# Patient Record
Sex: FEMALE | Race: BLACK OR AFRICAN AMERICAN | Employment: UNEMPLOYED | URBAN - METROPOLITAN AREA
[De-identification: names, ages, dates, MRNs, and addresses within clinical notes are randomized per-mention and may not be internally consistent; named-entity substitution may affect disease eponyms.]

---

## 2017-08-23 ENCOUNTER — OFFICE VISIT (OUTPATIENT)
Dept: OBGYN CLINIC | Age: 27
End: 2017-08-23

## 2017-08-23 DIAGNOSIS — N76.0 ACUTE VAGINITIS: ICD-10-CM

## 2017-08-23 DIAGNOSIS — R31.9 URINARY TRACT INFECTION WITH HEMATURIA, SITE UNSPECIFIED: Primary | ICD-10-CM

## 2017-08-23 DIAGNOSIS — N39.0 URINARY TRACT INFECTION WITH HEMATURIA, SITE UNSPECIFIED: Primary | ICD-10-CM

## 2017-08-23 LAB
BILIRUB UR QL STRIP: NEGATIVE
GLUCOSE UR-MCNC: NORMAL MG/DL
KETONES P FAST UR STRIP-MCNC: NEGATIVE MG/DL
PH UR STRIP: 7 [PH] (ref 4.6–8)
PROT UR QL STRIP: NEGATIVE MG/DL
SP GR UR STRIP: 1.01 (ref 1–1.03)
UA UROBILINOGEN AMB POC: NORMAL (ref 0.2–1)
URINALYSIS CLARITY POC: NORMAL
URINALYSIS COLOR POC: YELLOW
URINE BLOOD POC: NORMAL
URINE LEUKOCYTES POC: NORMAL
URINE NITRITES POC: NEGATIVE

## 2017-08-23 RX ORDER — NITROFURANTOIN 25; 75 MG/1; MG/1
100 CAPSULE ORAL 2 TIMES DAILY
Qty: 14 CAP | Refills: 0 | Status: SHIPPED | OUTPATIENT
Start: 2017-08-23 | End: 2018-12-11

## 2017-08-23 NOTE — PROGRESS NOTES
UTI note    Mallorie Florez is a ,  32 y.o. female [address] who presents today with concerns regarding possible UTI. Her symptoms started 1 week ago, unchanged since that time. Discomfort is in the suprapubic area and does not radiate. Symptoms are not alleviated by hydration. Symptoms are exacerbated with activity. Patient does have a history of recurrent UTI. She does not have a history of pyelonephritis. She has a history of  has a past medical history of DM (diabetes mellitus) ([address] (2012); Essential hypertension; HPV test positive (11); Hypercholesterolemia; Migraine; Mild depression ([address] (2012); Pap smear for cervical cancer screening (13;09/28/15); Postpartum depression (2012); and Vitamin D deficiency. She also has no past medical history of Abnormal Papanicolaou smear of cervix; Anemia; Asthma; Breast disorder; Chlamydia; Complication of anesthesia; Disease of blood and blood forming organ; Epilepsy ([address] Genital herpes; Gonorrhea; Heart abnormality; Herpes gestationis; Herpes simplex virus (HSV) infection; Human immunodeficiency virus (HIV) disease ([address] Infertility, female; Kidney disease; Liver disease; Phlebitis and thrombophlebitis; Pituitary disorder ([address] Polycystic disease, ovaries; Rhesus isoimmunization affecting pregnancy; Sickle cell disease ([address] Syphilis; Systemic lupus erythematosus ([address] Thyroid activity decreased; or Trauma. with the following surgical history  has a past surgical history that includes  section (;) and  delivery only (). .  . She has not been evaluated for her current complaints. Of itching at the urethral orifice and blood in urine. Last urinalysis results are:    Urine dipstick shows negative for all components, positive for leukocytes and blood.     Results for orders placed or performed in visit on 11/19/15   AMB POC URINALYSIS DIP STICK MANUAL W/O MICRO     Status: None Result Value Ref Range Status    Color (UA POC) Yellow  Final    Clarity (UA POC) Slightly Cloudy  Final    Glucose (UA POC) 4+ Negative Final    Bilirubin (UA POC) Negative Negative Final    Ketones (UA POC) Negative Negative Final    Specific gravity (UA POC)  1.001 - 1.035     Blood (UA POC) Negative Negative Final    pH (UA POC)  4.6 - 8.0     Protein (UA POC) Negative Negative mg/dL Final    Urobilinogen (UA POC)  0.2 - 1     Nitrites (UA POC) Negative Negative Final    Leukocyte esterase (UA POC) Negative Negative Final   Results for orders placed or performed in visit on 14   AMB POC URINALYSIS DIP STICK AUTO W/O MICRO     Status: None   Result Value Ref Range Status    Color (UA POC) Yellow  Final     Comment: wbc 5-8    Clarity (UA POC) Clear  Final     Comment: epith 1    Glucose (UA POC) Negative Negative Final    Bilirubin (UA POC) Negative Negative Final    Ketones (UA POC) Negative Negative Final    Specific gravity (UA POC) 1.025 1.001 - 1.035 Final    Blood (UA POC) Negative Negative Final    pH (UA POC) 8.0 4.6 - 8.0 Final    Protein (UA POC) Negative Negative mg/dL Final    Urobilinogen (UA POC) 0.2 mg/dL 0.2 - 1 Final    Nitrites (UA POC) Negative Negative Final    Leukocyte esterase (UA POC) 1+ Negative Final       Past Medical History:   Diagnosis Date    DM (diabetes mellitus) ([facility] 2012    Type II    Essential hypertension     hx of pre-e with previous pregnancy    HPV test positive 11    Neg 16&18    Hypercholesterolemia     Migraine     Mild depression [facility] 2012    Pap smear for cervical cancer screening 13;09/28/15    Normal Pap, Neg GC    Postpartum depression 2012    Vitamin D deficiency      Past Surgical History:   Procedure Laterality Date     DELIVERY ONLY      c/s for LGA     HX  SECTION  ;2016    x2     Social History     Occupational History    Not on file.      Social History Main Topics    Smoking status: Never Smoker    Smokeless tobacco: Never Used    Alcohol use No    Drug use: No    Sexual activity: Not Currently     Partners: Male     Birth control/ protection: None     Family History   Problem Relation Age of Onset    Diabetes Mother      Type II    Diabetes Sister 25     Type II       No Known Allergies  Prior to Admission medications    Medication Sig Start Date End Date Taking? Authorizing Provider   insulin aspart (NOVOLOG) 100 unit/mL inpn Inject 30 to 35 units three times daily before meals 6/13/16   JORDAN Wells MD   insulin detemir (LEVEMIR FLEXTOUCH) 100 unit/mL (3 mL) inpn Inject 25 units at bedtime 6/6/16   Nai Wilson MD   norgestimate-ethinyl estradiol ([address] [address],) 0.25-35 mg-mcg tab Take 1 Tab by mouth daily. 5/24/16   Elvis Rao MD   BD INSULIN SYRINGE ULTRA-FINE 1/2 mL 31 x 5/16\" syrg USE THREE TIMES DAILY 1/7/16   JORDAN Wells MD   glucose blood VI test strips [facility] ULTRA TEST) strip Test blood glucose 4 daily Dx Code: E11.65 11/17/15   Nai Wilson MD   Blood-Glucose Meter (ONETOUCH ULTRAMINI) monitoring kit Test blood glucose 4 times daily Dx Code: E11.65 11/17/15   Nai Wilson MD   Lancets misc Test blood glucose 4 times daily Dx Code: E11.65 11/17/15   Nai Wilson MD        Review of Systems: History obtained from the patient  Constitutional: negative for weight loss, fever, night sweats  Breast: negative for breast lumps, nipple discharge, galactorrhea  GI: negative for change in bowel habits, abdominal pain, black or bloody stools  : see HPI, negative for vaginal discharge  MSK: negative for back pain, joint pain, muscle pain  Skin: negative for itching, rash, hives  Psych: negative for anxiety, depression, change in mood      Objective: There were no vitals taken for this visit.     Physical Exam:   PHYSICAL EXAMINATION    Constitutional  · Appearance: well-nourished, well developed, alert, in no acute distress    Gastrointestinal  · Abdominal Examination: abdomen non-tender to palpation, normal bowel sounds, no masses present  · Liver and spleen: no hepatomegaly present, spleen not palpable  · Hernias: no hernias identified    Genitourinary  · External Genitalia: normal appearance for age, no discharge present, no tenderness present, no inflammatory lesions present, no masses present, no atrophy present  · Vagina: normal vaginal vault without central or paravaginal defects, some discharge present, no inflammatory lesions present, no masses present  · Bladder: tender to palpation  · Urethra: appears normal  · Cervix: normal   · Uterus: normal size, shape and consistency  · Adnexa: no adnexal tenderness present, no adnexal masses present  · Perineum: perineum within normal limits, no evidence of trauma, no rashes or skin lesions present  · Anus: anus within normal limits, no hemorrhoids present  · Inguinal Lymph Nodes: no lymphadenopathy present    Skin  · General Inspection: no rash, no lesions identified    Neurologic/Psychiatric  · Mental Status:  · Orientation: grossly oriented to person, place and time  · Mood and Affect: mood normal, affect appropriate    Assessment:   UTI  Possible vaginitis    Plan:   Rx: macrobid and prn findings on NS.

## 2017-08-25 LAB — BACTERIA UR CULT: NO GROWTH

## 2017-08-28 LAB
A VAGINAE DNA VAG QL NAA+PROBE: NORMAL SCORE
BVAB2 DNA VAG QL NAA+PROBE: NORMAL SCORE
C ALBICANS DNA VAG QL NAA+PROBE: NEGATIVE
C GLABRATA DNA VAG QL NAA+PROBE: NEGATIVE
MEGA1 DNA VAG QL NAA+PROBE: NORMAL SCORE
T VAGINALIS RRNA SPEC QL NAA+PROBE: NEGATIVE

## 2017-09-19 ENCOUNTER — TELEPHONE (OUTPATIENT)
Dept: OBGYN CLINIC | Age: 27
End: 2017-09-19

## 2017-09-19 NOTE — TELEPHONE ENCOUNTER
Call received at 2:50PM      32year old patient last seen in the office on 8/23/17. Patient advised of MD reviewed labs and recommendations. Patient reports vaginal discharge for the past three days with odor. Patient placed on the schedule for 9/21/17 at 1:30PM    Patient verbalized understanding.

## 2017-09-21 ENCOUNTER — OFFICE VISIT (OUTPATIENT)
Dept: OBGYN CLINIC | Age: 27
End: 2017-09-21

## 2017-09-21 VITALS
WEIGHT: 132 LBS | HEIGHT: 62 IN | BODY MASS INDEX: 24.29 KG/M2 | SYSTOLIC BLOOD PRESSURE: 120 MMHG | DIASTOLIC BLOOD PRESSURE: 80 MMHG

## 2017-09-21 DIAGNOSIS — N76.0 VAGINITIS AND VULVOVAGINITIS: Primary | ICD-10-CM

## 2017-09-21 RX ORDER — METRONIDAZOLE 500 MG/1
500 TABLET ORAL 2 TIMES DAILY WITH MEALS
Qty: 14 TAB | Refills: 0 | Status: SHIPPED | OUTPATIENT
Start: 2017-09-21 | End: 2017-09-28

## 2017-09-21 NOTE — PROGRESS NOTES
Vaginitis evaluation    Chief Complaint   Vaginitis      HPI  32 y.o. female complains of yellow vaginal discharge with an odor for several days. No LMP recorded. She denies additional symptoms at this time. The patient denies aggravating factors. She is not concerned about possible STI exposure at this time but she would like to add them on the swab. She denies exposure to new chemicals ot hygenic agents  Previous treatment included: none    Past Medical History:   Diagnosis Date    DM (diabetes mellitus) ([facility] 2012    Type II    Essential hypertension     hx of pre-e with previous pregnancy    HPV test positive 11    Neg 16&18    Hypercholesterolemia     Migraine     Mild depression ([facility] 2012    Pap smear for cervical cancer screening 13;09/28/15    Normal Pap, Neg GC    Postpartum depression 2012    Vitamin D deficiency      Past Surgical History:   Procedure Laterality Date     DELIVERY ONLY      c/s for LGA     HX  SECTION  ;2016    x2     Social History     Occupational History    Not on file. Social History Main Topics    Smoking status: Never Smoker    Smokeless tobacco: Never Used    Alcohol use No    Drug use: No    Sexual activity: Not Currently     Partners: Male     Birth control/ protection: None     Family History   Problem Relation Age of Onset    Diabetes Mother      Type II    Diabetes Sister 25     Type II        No Known Allergies  Prior to Admission medications    Medication Sig Start Date End Date Taking? Authorizing Provider   nitrofurantoin, macrocrystal-monohydrate, (MACROBID) 100 mg capsule Take 1 Cap by mouth two (2) times a day.  17   Ezekiel Hirsch MD   insulin aspart (NOVOLOG) 100 unit/mL inpn Inject 30 to 35 units three times daily before meals 16   JORDAN Leon MD   insulin detemir (LEVEMIR FLEXTOUCH) 100 unit/mL (3 mL) inpn Inject 25 units at bedtime 16   Margy Hanna MD norgestimate-ethinyl estradiol ([facility], ,) 0.25-35 mg-mcg tab Take 1 Tab by mouth daily.  5/24/16   Anthony Becerra MD   BD INSULIN SYRINGE ULTRA-FINE 1/2 mL 31 x 5/16\" syrg USE THREE TIMES DAILY 1/7/16   JORDAN Martin MD   glucose blood VI test strips [facility] ULTRA TEST) strip Test blood glucose 4 daily Dx Code: E11.65 11/17/15   Kathe Wells MD   Blood-Glucose Meter (ONETOUCH ULTRAMINI) monitoring kit Test blood glucose 4 times daily Dx Code: E11.65 11/17/15   Kathe Wells MD   Lancets misc Test blood glucose 4 times daily Dx Code: E11.65 11/17/15   Kathe Wells MD                      Review of Systems - History obtained from the patient  Constitutional: negative for weight loss, fever, night sweats  Breast: negative for breast lumps, nipple discharge, galactorrhea  GI: negative for change in bowel habits, abdominal pain, black or bloody stools  : negative for frequency, dysuria, hematuria  MSK: negative for back pain, joint pain, muscle pain  Skin: negative for itching, rash, hives  Neuro: negative for dizziness, headache, confusion, weakness  Psych: negative for anxiety, depression, change in mood  Heme/lymph: negative for bleeding, bruising, pallor       Objective:    Visit Vitals    /80    Ht 5' 2\" (1.575 m)    Wt 132 lb (59.9 kg)    BMI 24.14 kg/m2       Physical Exam:   PHYSICAL EXAMINATION    Constitutional  · Appearance: well-nourished, well developed, alert, in no acute distress    HENT  · Head and Face: appears normal    Genitourinary  · External Genitalia: normal appearance for age, no discharge present, no tenderness present, no inflammatory lesions present, no masses present, no atrophy present  · Vagina:  tan discharge present, otherwise normal vaginal vault without central or paravaginal defects, no inflammatory lesions present, no masses present  · Bladder: non-tender to palpation  · Urethra: appears normal  · Cervix: normal   · Uterus: normal size, shape and consistency  · Adnexa: no adnexal tenderness present, no adnexal masses present  · Perineum: perineum within normal limits, no evidence of trauma, no rashes or skin lesions present  · Anus: anus within normal limits, no hemorrhoids present  · Inguinal Lymph Nodes: no lymphadenopathy present    Skin  · General Inspection: no rash, no lesions identified    Neurologic/Psychiatric  · Mental Status:  · Orientation: grossly oriented to person, place and time  · Mood and Affect: mood normal, affect appropriate    No results found for any visits on 09/21/17. Assessment:   Clinically bacterial vaginosis    Plan:   Treatment: per NS, with Flagyl sent today--call results    ROV prn if symptoms persist or worsen.

## 2017-09-27 LAB
A VAGINAE DNA VAG QL NAA+PROBE: ABNORMAL SCORE
BVAB2 DNA VAG QL NAA+PROBE: ABNORMAL SCORE
C ALBICANS DNA VAG QL NAA+PROBE: NEGATIVE
C GLABRATA DNA VAG QL NAA+PROBE: NEGATIVE
C TRACH RRNA SPEC QL NAA+PROBE: NEGATIVE
MEGA1 DNA VAG QL NAA+PROBE: ABNORMAL SCORE
N GONORRHOEA RRNA SPEC QL NAA+PROBE: NEGATIVE
T VAGINALIS RRNA SPEC QL NAA+PROBE: NEGATIVE

## 2017-10-04 ENCOUNTER — TELEPHONE (OUTPATIENT)
Dept: OBGYN CLINIC | Age: 27
End: 2017-10-04

## 2017-10-04 RX ORDER — METRONIDAZOLE 500 MG/1
500 TABLET ORAL 2 TIMES DAILY
Qty: 14 TAB | Refills: 0 | Status: SHIPPED | OUTPATIENT
Start: 2017-10-04 | End: 2017-10-11

## 2017-10-04 NOTE — TELEPHONE ENCOUNTER
Prescription sent per MD order. Patient verbalized understanding of results.     Notes Recorded by Luna Manley MD on 9/27/2017 at 8:25 AM  Notify BV only. Barbi Venegas Rx'd Flagyl 500 bid for 7 days, one refill

## 2017-10-04 NOTE — PROGRESS NOTES
Patient called back and was notified of results and MD recommendations. Patient verbalized understanding. Prescription sent per MD order.

## 2017-10-16 RX ORDER — INSULIN GLARGINE 100 [IU]/ML
INJECTION, SOLUTION SUBCUTANEOUS
Qty: 15 ML | Refills: 3 | Status: SHIPPED | OUTPATIENT
Start: 2017-10-16 | End: 2018-07-11 | Stop reason: SDUPTHER

## 2017-11-20 ENCOUNTER — TELEPHONE (OUTPATIENT)
Dept: OBGYN CLINIC | Age: 27
End: 2017-11-20

## 2017-11-20 NOTE — TELEPHONE ENCOUNTER
Patient calling stating that she believes she has another UTI and wanted an appointment. Patient states that she has urinary frequency and burning. Patient wanted to be seen tomorrow and advised that she is welcomed to contact back to see if someone cancels as HW is not available at the times she needed. Patient advised that she could go to a local urgent care and patient asked me to find one near her and patient given the address to [facility] here in [place] [address]. Patient will go there later one tonight.

## 2018-01-03 ENCOUNTER — OFFICE VISIT (OUTPATIENT)
Dept: OBGYN CLINIC | Age: 28
End: 2018-01-03

## 2018-01-03 VITALS
DIASTOLIC BLOOD PRESSURE: 80 MMHG | WEIGHT: 132 LBS | HEIGHT: 62 IN | BODY MASS INDEX: 24.29 KG/M2 | SYSTOLIC BLOOD PRESSURE: 120 MMHG

## 2018-01-03 DIAGNOSIS — N76.0 VAGINITIS AND VULVOVAGINITIS: ICD-10-CM

## 2018-01-03 DIAGNOSIS — N93.9 ABNORMAL UTERINE BLEEDING: Primary | ICD-10-CM

## 2018-01-03 DIAGNOSIS — R10.2 PELVIC PAIN: ICD-10-CM

## 2018-01-03 LAB
BILIRUB UR QL STRIP: NEGATIVE
GLUCOSE UR-MCNC: NEGATIVE MG/DL
HCG URINE, QL. (POC): NEGATIVE
KETONES P FAST UR STRIP-MCNC: NEGATIVE MG/DL
PH UR STRIP: 4.6 [PH] (ref 4.6–8)
PROT UR QL STRIP: NEGATIVE
SP GR UR STRIP: 1 (ref 1–1.03)
UA UROBILINOGEN AMB POC: NORMAL (ref 0.2–1)
URINALYSIS CLARITY POC: CLEAR
URINALYSIS COLOR POC: YELLOW
URINE BLOOD POC: NEGATIVE
URINE LEUKOCYTES POC: NEGATIVE
URINE NITRITES POC: NEGATIVE
VALID INTERNAL CONTROL?: YES

## 2018-01-03 RX ORDER — DESOGESTREL AND ETHINYL ESTRADIOL 0.15-0.03
1 KIT ORAL DAILY
Qty: 3 DOSE PACK | Refills: 1 | Status: SHIPPED | OUTPATIENT
Start: 2018-01-03 | End: 2018-04-10 | Stop reason: SDUPTHER

## 2018-01-03 RX ORDER — METRONIDAZOLE 7.5 MG/G
1 GEL VAGINAL
Qty: 187.5 MG | Refills: 0 | Status: SHIPPED | OUTPATIENT
Start: 2018-01-03 | End: 2018-04-10 | Stop reason: SDUPTHER

## 2018-01-03 NOTE — MR AVS SNAPSHOT
Visit Information Date & Time Provider Department Dept. Phone Encounter #  
 1/3/2018  9:30 AM Judy Subramanian, [facility]--[phone] [id number] Upcoming Health Maintenance Date Due Influenza Age 5 to Adult 8/1/2017 PAP AKA CERVICAL CYTOLOGY 9/28/2018 Allergies as of 1/3/2018  Review Complete On: 1/3/2018 By: Soraida Christensen No Known Allergies Current Immunizations  Never Reviewed Name Date Influenza Vaccine Jessica Senna) 10/26/2015 MMR 4/14/2016  9:51 AM  
 TB Skin Test (PPD) Intradermal 9/22/2014 Tdap 2/17/2016 Not reviewed this visit You Were Diagnosed With   
  
 Codes Comments Abnormal uterine bleeding    -  Primary ICD-10-CM: N93.9 ICD-9-CM: 626.9 Pelvic pain     ICD-10-CM: R10.2 ICD-9-CM: FFH4456 Vaginitis and vulvovaginitis     ICD-10-CM: N76.0 ICD-9-CM: 616.10 Vitals BP Height(growth percentile) Weight(growth percentile) Breastfeeding? BMI OB Status 120/80 5' 2\" (1.575 m) 132 lb (59.9 kg) No 24.14 kg/m2 Unknown Smoking Status Never Smoker BMI and BSA Data Body Mass Index Body Surface Area  
 24.14 kg/m 2 1.62 m 2 Preferred Pharmacy Pharmacy Name Phone [id number] [phone] Your Updated Medication List  
  
   
This list is accurate as of: 1/3/18 10:09 AM.  Always use your most recent med list.  
  
  
  
  
 BD INSULIN SYRINGE ULTRA-FINE 0.3 mL 31 gauge x 5/16 Syrg Generic drug:  Insulin Syringe-Needle U-100  
USE 3 TIMES A DAY BD INSULIN SYRINGE ULTRA-FINE 0.5 mL 31 gauge x 5/16 Syrg Generic drug:  Insulin Syringe-Needle U-100  
USE THREE TIMES DAILY Blood-Glucose Meter monitoring kit Commonly known as:  Mary Alice Bledsoe Test blood glucose 4 times daily Dx Code: E11.65  
  
 desogestrel-ethinyl estradiol 0.15-0.03 mg Tab Commonly known as:  DESOGEN  
 Take 1 Tab by mouth daily. glucose blood VI test strips strip Commonly known as:  ONETOUCH ULTRA TEST Test blood glucose 4 daily Dx Code: E11.65 Lancets Misc Test blood glucose 4 times daily Dx Code: E11.65  
  
 LANTUS SOLOSTAR 100 unit/mL (3 mL) Inpn Generic drug:  insulin glargine INJECT 25 UNITS UNDER THE SKIN IN THE EVENING  
  
 metroNIDAZOLE 0.75 % vaginal gel Commonly known as:  Jacey Flavors Insert 1 Applicator into vagina nightly for 5 days. nitrofurantoin (macrocrystal-monohydrate) 100 mg capsule Commonly known as:  MACROBID Take 1 Cap by mouth two (2) times a day. norgestimate-ethinyl estradiol 0.25-35 mg-mcg Tab Commonly known as:  [address] [place] [address] () Take 1 Tab by mouth daily. NovoLOG Flexpen 100 unit/mL Inpn Generic drug:  insulin aspart INJECT 30 TO 35 UNITS 3 TIMES A DAY BEFORE MEALS Prescriptions Sent to Pharmacy Refills  
 metroNIDAZOLE (METROGEL) 0.75 % vaginal gel 0 Sig: Insert 1 Applicator into vagina nightly for 5 days. Class: Normal  
 Pharmacy: [facility]/pharmacy #[id number] Ph #: [phone] Route: Vaginal  
 desogestrel-ethinyl estradiol (DESOGEN) 0.15-0.03 mg tab 1 Sig: Take 1 Tab by mouth daily. Class: Normal  
 Pharmacy: [facility]/pharmacy #[id number] Ph #: [phone] Route: Oral  
  
We Performed the Following AMB POC URINALYSIS DIP STICK MANUAL W/O MICRO [99957 CPT(R)] AMB POC URINE PREGNANCY TEST, VISUAL COLOR COMPARISON [78246 CPT(R)] Patient Instructions Abnormal Uterine Bleeding: Care Instructions Your Care Instructions Abnormal uterine bleeding (AUB) is irregular bleeding from the uterus that is longer or heavier than usual or does not occur at your regular time. Sometimes it is caused by changes in hormone levels. It can also be caused by growths in the uterus, such as fibroids or polyps.  Sometimes a cause cannot be found. You may have heavy bleeding when you are not expecting your period. Your doctor may suggest a pregnancy test, if you think you are pregnant. Follow-up care is a key part of your treatment and safety. Be sure to make and go to all appointments, and call your doctor if you are having problems. It's also a good idea to know your test results and keep a list of the medicines you take. How can you care for yourself at home? · Be safe with medicines. Take pain medicines exactly as directed. ¨ If the doctor gave you a prescription medicine for pain, take it as prescribed. ¨ If you are not taking a prescription pain medicine, ask your doctor if you can take an over-the-counter medicine. · You may be low in iron because of blood loss. Eat a balanced diet that is high in iron and vitamin C. Foods rich in iron include red meat, shellfish, eggs, beans, and leafy green vegetables. Talk to your doctor about whether you need to take iron pills or a multivitamin. When should you call for help? Call 911 anytime you think you may need emergency care. For example, call if: 
? · You passed out (lost consciousness). ?Call your doctor now or seek immediate medical care if: 
? · You have new or worse belly or pelvic pain. ? · You have severe vaginal bleeding. ? · You feel dizzy or lightheaded, or you feel like you may faint. ? Watch closely for changes in your health, and be sure to contact your doctor if: 
? · You think you may be pregnant. ? · Your bleeding gets worse. ? · You do not get better as expected. Where can you learn more? Go to http://kirstin-arsenio.info/. Enter F747 in the search box to learn more about \"Abnormal Uterine Bleeding: Care Instructions. \" Current as of: October 13, 2016 Content Version: 11.4 © 1272-7861 Healthwise, [employer].  Care instructions adapted under license by [employer] (which disclaims liability or warranty for this information). If you have questions about a medical condition or this instruction, always ask your healthcare professional. [address] any warranty or liability for your use of this information. Introducing [facility] SERVICES! Dear Dutch Cat: Thank you for requesting a [employer] account. Our records indicate that you have previously registered for a [employer] account but its currently inactive. Please call our [employer] support line at [phone]. Additional Information If you have questions, please visit the Frequently Asked Questions section of the [employer] website at [email or web address] Remember, [employer] is NOT to be used for urgent needs. For medical emergencies, dial 911. Now available from your iPhone and Android! Please provide this summary of care documentation to your next provider. Your primary care clinician is listed as JORDAN Bergeron. If you have any questions after today's visit, please call [phone].

## 2018-01-03 NOTE — PATIENT INSTRUCTIONS
Abnormal Uterine Bleeding: Care Instructions  Your Care Instructions    Abnormal uterine bleeding (AUB) is irregular bleeding from the uterus that is longer or heavier than usual or does not occur at your regular time. Sometimes it is caused by changes in hormone levels. It can also be caused by growths in the uterus, such as fibroids or polyps. Sometimes a cause cannot be found. You may have heavy bleeding when you are not expecting your period. Your doctor may suggest a pregnancy test, if you think you are pregnant. Follow-up care is a key part of your treatment and safety. Be sure to make and go to all appointments, and call your doctor if you are having problems. It's also a good idea to know your test results and keep a list of the medicines you take. How can you care for yourself at home? · Be safe with medicines. Take pain medicines exactly as directed. ¨ If the doctor gave you a prescription medicine for pain, take it as prescribed. ¨ If you are not taking a prescription pain medicine, ask your doctor if you can take an over-the-counter medicine. · You may be low in iron because of blood loss. Eat a balanced diet that is high in iron and vitamin C. Foods rich in iron include red meat, shellfish, eggs, beans, and leafy green vegetables. Talk to your doctor about whether you need to take iron pills or a multivitamin. When should you call for help? Call 911 anytime you think you may need emergency care. For example, call if:  ? · You passed out (lost consciousness). ?Call your doctor now or seek immediate medical care if:  ? · You have new or worse belly or pelvic pain. ? · You have severe vaginal bleeding. ? · You feel dizzy or lightheaded, or you feel like you may faint. ? Watch closely for changes in your health, and be sure to contact your doctor if:  ? · You think you may be pregnant. ? · Your bleeding gets worse. ? · You do not get better as expected.    Where can you learn more?  Go to http://kirstin-arsenio.info/. Enter U565 in the search box to learn more about \"Abnormal Uterine Bleeding: Care Instructions. \"  Current as of: October 13, 2016  Content Version: 11.4  © 8367-1060 [employer]. Care instructions adapted under license by [employer] (which disclaims liability or warranty for this information). If you have questions about a medical condition or this instruction, always ask your healthcare professional. [place] [address] any warranty or liability for your use of this information.

## 2018-01-03 NOTE — PROGRESS NOTES
Abnormal bleeding note      Laverne Barfield is a 32 y.o. female who complains of vaginal bleeding problems. Her current method of family planning is none. She developed this problem approximately 2 months ago. She has had vaginal bleeding which she describes as light, heavy lasting up to everyday. Pad or tampon count: changes every few hours. Associated symptoms include pelvic pain (RLQ and LLQ) with radiation to her lower back, and possible BV with an odor    Alleviating factors: none    Aggravating factors: none      The patient is sexually active. Last Pap smear:was normal.    Her relevant past medical history:   Past Medical History:   Diagnosis Date    DM (diabetes mellitus) ([facility] [address] 2012    Type II    Essential hypertension     hx of pre-e with previous pregnancy    HPV test positive 11    Neg 16&18    Hypercholesterolemia     Migraine     [facility]    Pap smear for cervical cancer screening 13;09/28/15    Normal Pap, Neg GC    Postpartum depression 2012    Vitamin D deficiency         Past Surgical History:   Procedure Laterality Date     DELIVERY ONLY      c/s for LGA     HX  SECTION  ;2016    x2     Social History     Occupational History    Not on file. Social History Main Topics    Smoking status: Never Smoker    Smokeless tobacco: Never Used    Alcohol use No    Drug use: No    Sexual activity: Yes     Partners: Male     Birth control/ protection: None     Family History   Problem Relation Age of Onset    Diabetes Mother      Type II    Diabetes Sister 25     Type II       No Known Allergies  Prior to Admission medications    Medication Sig Start Date End Date Taking?  Authorizing Provider   BD INSULIN SYRINGE ULTRA-FINE 0.3 mL 31 gauge x  syrg USE 3 TIMES A DAY 10/16/17   JORDAN Smith MD   NOVOLOG FLEXPEN 100 unit/mL inpn INJECT 30 TO 35 UNITS 3 TIMES A DAY BEFORE MEALS 10/16/17   JORDAN Smith MD QUILES SOLOSTAR 100 unit/mL (3 mL) inpn INJECT 25 UNITS UNDER THE SKIN IN THE EVENING 10/16/17   Corey Adair MD   nitrofurantoin, macrocrystal-monohydrate, (MACROBID) 100 mg capsule Take 1 Cap by mouth two (2) times a day. 8/23/17   Pippa Nicholas MD   norgestimate-ethinyl estradiol ([facility] [place] [id number],) 0.25-35 mg-mcg tab Take 1 Tab by mouth daily.  5/24/16   Pippa Nicholas MD   BD INSULIN SYRINGE ULTRA-FINE 1/2 mL 31 x 5/16\" syrg USE THREE TIMES DAILY 1/7/16   C Veronika Rinaldi MD   glucose blood VI test strips [facility] ULTRA TEST) strip Test blood glucose 4 daily Dx Code: E11.65 11/17/15   Corey Adair MD   Blood-Glucose Meter (ONETOUCH ULTRAMINI) monitoring kit Test blood glucose 4 times daily Dx Code: E11.65 11/17/15   Corey Adair MD   Lancets misc Test blood glucose 4 times daily Dx Code: E11.65 11/17/15   Corey Adair MD        Review of Systems - History obtained from the patient  Constitutional: negative for weight loss, fever, night sweats  HEENT: negative for hearing loss, earache, congestion, snoring, sorethroat  CV: negative for chest pain, palpitations, edema  Resp: negative for cough, shortness of breath, wheezing  Breast: negative for breast lumps, nipple discharge, galactorrhea  GI: negative for change in bowel habits, abdominal pain, black or bloody stools  : negative for frequency, dysuria, hematuria  MSK: negative for back pain, joint pain, muscle pain  Skin: negative for itching, rash, hives  Neuro: negative for dizziness, headache, confusion, weakness  Psych: negative for anxiety, depression, change in mood  Heme/lymph: negative for bleeding, bruising, pallor      Objective:    Visit Vitals    /80    Ht 5' 2\" (1.575 m)    Wt 132 lb (59.9 kg)    Breastfeeding No    BMI 24.14 kg/m2          PHYSICAL EXAMINATION    Constitutional  · Appearance: well-nourished, well developed, alert, in no acute distress    HENT  · Head and Face: appears normal    Gastrointestinal  · Abdominal Examination: abdomen non-tender to palpation, normal bowel sounds, no masses present  · Liver and spleen: no hepatomegaly present, spleen not palpable  · Hernias: no hernias identified    Genitourinary  · External Genitalia: normal appearance for age, no discharge present, no tenderness present, no inflammatory lesions present, no masses present, no atrophy present  · Vagina: normal vaginal vault without central or paravaginal defects, some thin discharge present, no inflammatory lesions present, no masses present  · Bladder: non-tender to palpation  · Urethra: appears normal  · Cervix: normal   · Uterus: normal size, shape and consistency  · Adnexa: no adnexal tenderness present, no adnexal masses present  · Perineum: perineum within normal limits, no evidence of trauma, no rashes or skin lesions present  · Anus: anus within normal limits, no hemorrhoids present  · Inguinal Lymph Nodes: no lymphadenopathy present    Skin  · General Inspection: no rash, no lesions identified    Neurologic/Psychiatric  · Mental Status:  · Orientation: grossly oriented to person, place and time  · Mood and Affect: mood normal, affect appropriate    Assessment:   DU bleeding  Vaginitis    Plan:   Rx OCP--[facility]  Call results of NS+. RTO for AE in late spring        Instructions given to pt. Handouts given to pt.                 \

## 2018-02-04 ENCOUNTER — APPOINTMENT (OUTPATIENT)
Dept: GENERAL RADIOLOGY | Age: 28
End: 2018-02-04
Attending: PHYSICIAN ASSISTANT
Payer: MEDICAID

## 2018-02-04 ENCOUNTER — HOSPITAL ENCOUNTER (EMERGENCY)
Age: 28
Discharge: HOME | End: 2018-02-04
Attending: EMERGENCY MEDICINE
Payer: MEDICAID

## 2018-02-04 VITALS
HEIGHT: 62 IN | TEMPERATURE: 98.7 F | OXYGEN SATURATION: 97 % | HEART RATE: 86 BPM | WEIGHT: 137.13 LBS | BODY MASS INDEX: 25.23 KG/M2 | SYSTOLIC BLOOD PRESSURE: 108 MMHG | DIASTOLIC BLOOD PRESSURE: 69 MMHG | RESPIRATION RATE: 16 BRPM

## 2018-02-04 DIAGNOSIS — R09.81 NASAL CONGESTION: ICD-10-CM

## 2018-02-04 DIAGNOSIS — R53.83 FATIGUE, UNSPECIFIED TYPE: Primary | ICD-10-CM

## 2018-02-04 LAB
ALBUMIN SERPL-MCNC: 3.7 G/DL (ref 3.5–5)
ALBUMIN/GLOB SERPL: 0.9 {RATIO} (ref 1.1–2.2)
ALP SERPL-CCNC: 79 U/L (ref 45–117)
ALT SERPL-CCNC: 25 U/L (ref 12–78)
ANION GAP SERPL CALC-SCNC: 10 MMOL/L (ref 5–15)
APPEARANCE UR: CLEAR
AST SERPL-CCNC: 19 U/L (ref 15–37)
BACTERIA URNS QL MICRO: NEGATIVE /HPF
BASOPHILS # BLD: 0 K/UL (ref 0–0.1)
BASOPHILS NFR BLD: 1 % (ref 0–1)
BILIRUB SERPL-MCNC: <0.1 MG/DL (ref 0.2–1)
BILIRUB UR QL: NEGATIVE
BUN SERPL-MCNC: 13 MG/DL (ref 6–20)
BUN/CREAT SERPL: 20 (ref 12–20)
CALCIUM SERPL-MCNC: 8.9 MG/DL (ref 8.5–10.1)
CHLORIDE SERPL-SCNC: 101 MMOL/L (ref 97–108)
CO2 SERPL-SCNC: 25 MMOL/L (ref 21–32)
COLOR UR: ABNORMAL
CREAT SERPL-MCNC: 0.66 MG/DL (ref 0.55–1.02)
DIFFERENTIAL METHOD BLD: NORMAL
EOSINOPHIL # BLD: 0.1 K/UL (ref 0–0.4)
EOSINOPHIL NFR BLD: 1 % (ref 0–7)
EPITH CASTS URNS QL MICRO: ABNORMAL /LPF
ERYTHROCYTE [DISTWIDTH] IN BLOOD BY AUTOMATED COUNT: 11.8 % (ref 11.5–14.5)
FLUAV AG NPH QL IA: NEGATIVE
FLUBV AG NOSE QL IA: NEGATIVE
GLOBULIN SER CALC-MCNC: 4.3 G/DL (ref 2–4)
GLUCOSE SERPL-MCNC: 281 MG/DL (ref 65–100)
GLUCOSE UR STRIP.AUTO-MCNC: >1000 MG/DL
HCG UR QL: NEGATIVE
HCT VFR BLD AUTO: 40.9 % (ref 35–47)
HGB BLD-MCNC: 13.5 G/DL (ref 11.5–16)
HGB UR QL STRIP: NEGATIVE
IMM GRANULOCYTES # BLD: 0 K/UL (ref 0–0.04)
IMM GRANULOCYTES NFR BLD AUTO: 0 % (ref 0–0.5)
KETONES UR QL STRIP.AUTO: NEGATIVE MG/DL
LEUKOCYTE ESTERASE UR QL STRIP.AUTO: NEGATIVE
LYMPHOCYTES # BLD: 3.4 K/UL (ref 0.8–3.5)
LYMPHOCYTES NFR BLD: 48 % (ref 12–49)
MCH RBC QN AUTO: 29 PG (ref 26–34)
MCHC RBC AUTO-ENTMCNC: 33 G/DL (ref 30–36.5)
MCV RBC AUTO: 88 FL (ref 80–99)
MONOCYTES # BLD: 0.5 K/UL (ref 0–1)
MONOCYTES NFR BLD: 7 % (ref 5–13)
NEUTS SEG # BLD: 3 K/UL (ref 1.8–8)
NEUTS SEG NFR BLD: 43 % (ref 32–75)
NITRITE UR QL STRIP.AUTO: NEGATIVE
NRBC # BLD: 0 K/UL (ref 0–0.01)
NRBC BLD-RTO: 0 PER 100 WBC
PH UR STRIP: 7 [PH] (ref 5–8)
PLATELET # BLD AUTO: 350 K/UL (ref 150–400)
PMV BLD AUTO: 9.7 FL (ref 8.9–12.9)
POTASSIUM SERPL-SCNC: 4.1 MMOL/L (ref 3.5–5.1)
PROT SERPL-MCNC: 8 G/DL (ref 6.4–8.2)
PROT UR STRIP-MCNC: NEGATIVE MG/DL
RBC # BLD AUTO: 4.65 M/UL (ref 3.8–5.2)
RBC #/AREA URNS HPF: ABNORMAL /HPF (ref 0–5)
S PYO AG THROAT QL: NEGATIVE
SODIUM SERPL-SCNC: 136 MMOL/L (ref 136–145)
SP GR UR REFRACTOMETRY: 1 (ref 1–1.03)
TROPONIN I SERPL-MCNC: <0.04 NG/ML
UR CULT HOLD, URHOLD: NORMAL
UROBILINOGEN UR QL STRIP.AUTO: 0.2 EU/DL (ref 0.2–1)
WBC # BLD AUTO: 6.9 K/UL (ref 3.6–11)
WBC URNS QL MICRO: ABNORMAL /HPF (ref 0–4)

## 2018-02-04 PROCEDURE — 99284 EMERGENCY DEPT VISIT MOD MDM: CPT

## 2018-02-04 PROCEDURE — 36415 COLL VENOUS BLD VENIPUNCTURE: CPT | Performed by: PHYSICIAN ASSISTANT

## 2018-02-04 PROCEDURE — 93005 ELECTROCARDIOGRAM TRACING: CPT

## 2018-02-04 PROCEDURE — 87070 CULTURE OTHR SPECIMN AEROBIC: CPT | Performed by: STUDENT IN AN ORGANIZED HEALTH CARE EDUCATION/TRAINING PROGRAM

## 2018-02-04 PROCEDURE — 87880 STREP A ASSAY W/OPTIC: CPT

## 2018-02-04 PROCEDURE — 87804 INFLUENZA ASSAY W/OPTIC: CPT | Performed by: PHYSICIAN ASSISTANT

## 2018-02-04 PROCEDURE — 80053 COMPREHEN METABOLIC PANEL: CPT | Performed by: PHYSICIAN ASSISTANT

## 2018-02-04 PROCEDURE — 71046 X-RAY EXAM CHEST 2 VIEWS: CPT

## 2018-02-04 PROCEDURE — 81001 URINALYSIS AUTO W/SCOPE: CPT | Performed by: PHYSICIAN ASSISTANT

## 2018-02-04 PROCEDURE — 85025 COMPLETE CBC W/AUTO DIFF WBC: CPT | Performed by: PHYSICIAN ASSISTANT

## 2018-02-04 PROCEDURE — 84484 ASSAY OF TROPONIN QUANT: CPT | Performed by: PHYSICIAN ASSISTANT

## 2018-02-04 PROCEDURE — 81025 URINE PREGNANCY TEST: CPT

## 2018-02-04 NOTE — ED TRIAGE NOTES
Triage Note: Patient complains of a generalized headache, body aches, fatigue and nasal congestion over the last few days that is intermittent.

## 2018-02-04 NOTE — ED PROVIDER NOTES
HPI Comments: 33 y/o female with PMHx of DM, HTN, HLD, migraines and depression, presenting with complaint of nasal congestion, fatigue and generalized body aches. Symptoms have been intermittent for the past week. Aggravating factors include sitting down; alleviating factors include standing and walking. She reports a few intermittent episodes of chest pain when she is lying down, as well as some dyspnea on exertion. She also reports some sweats, mild sore throat, and an episode of light-headedness this morning. She has no pain currently. She denies fever, chills, cough, abdominal pain, N/V/D, dysuria or syncope. The history is provided by the patient. Past Medical History:   Diagnosis Date    DM (diabetes mellitus) ([facility] [address] 2012    Type II    Essential hypertension     hx of pre-e with previous pregnancy    HPV test positive 11    Neg 16&18    Hypercholesterolemia     Migraine     Mild depression ([facility] [address] 2012    Pap smear for cervical cancer screening 13;09/28/15    Normal Pap, Neg GC    Postpartum depression 2012    Vitamin D deficiency        Past Surgical History:   Procedure Laterality Date     DELIVERY ONLY      c/s for LGA     HX  SECTION  ;2016    x2         Family History:   Problem Relation Age of Onset    Diabetes Mother      Type II    Diabetes Sister 25     Type II       Social History     Social History    Marital status: SINGLE     Spouse name: N/A    Number of children: N/A    Years of education: N/A     Occupational History    Not on file. Social History Main Topics    Smoking status: Never Smoker    Smokeless tobacco: Never Used    Alcohol use No    Drug use: No    Sexual activity: Yes     Partners: Male     Birth control/ protection: None     Other Topics Concern    Not on file     Social History Narrative         ALLERGIES: Review of patient's allergies indicates no known allergies.     Review of Systems Constitutional: Positive for diaphoresis. Negative for chills and fever. HENT: Positive for congestion, postnasal drip and sore throat (mild). Respiratory: Positive for shortness of breath. Negative for cough. Cardiovascular: Positive for chest pain. Gastrointestinal: Negative for abdominal pain, diarrhea, nausea and vomiting. Genitourinary: Negative for dysuria, frequency, hematuria and urgency. Musculoskeletal: Positive for myalgias (generalized body aches). Skin: Negative for wound. Neurological: Positive for light-headedness (this morning). Negative for dizziness and syncope. All other systems reviewed and are negative. Vitals:    02/04/18 1357   BP: 127/83   Pulse: 79   Resp: 16   Temp: 97.9 °F (36.6 °C)   SpO2: 99%   Weight: 62.2 kg (137 lb 2 oz)   Height: 5' 2\" (1.575 m)            Physical Exam   Constitutional: She is oriented to person, place, and time. She appears well-developed and well-nourished. No distress. HENT:   Head: Normocephalic and atraumatic. Mouth/Throat: Uvula is midline and mucous membranes are normal. No trismus in the jaw. No uvula swelling. Oropharyngeal exudate, posterior oropharyngeal edema and posterior oropharyngeal erythema present. No tonsillar abscesses. Eyes: Conjunctivae and EOM are normal.   Neck: Normal range of motion. Neck supple. Cardiovascular: Normal rate, regular rhythm and normal heart sounds. Pulmonary/Chest: Effort normal and breath sounds normal.   Abdominal: Soft. She exhibits no distension. There is no tenderness. There is no rebound and no guarding. Musculoskeletal: Normal range of motion. Lymphadenopathy:     She has no cervical adenopathy. Neurological: She is alert and oriented to person, place, and time. Skin: Skin is warm and dry. She is not diaphoretic. Nursing note and vitals reviewed.        MDM  Number of Diagnoses or Management Options  Fatigue, unspecified type:   Nasal congestion:      Amount and/or Complexity of Data Reviewed  Clinical lab tests: ordered and reviewed  Tests in the radiology section of CPT®: ordered and reviewed  Independent visualization of images, tracings, or specimens: yes (CXR)    Patient Progress  Patient progress: stable        ED Course       Procedures        31 y/o female with PMHx of DM, HTN, HLD, migraines and depression, presenting with complaint of nasal congestion, fatigue and generalized body aches. History and exam concerning for possible strep throat, influenza, pneumonia, pericarditis, ACS, arrhythmia, anemia. Will obtain EKG, CXR, CBC, CMP, troponin, UA, urine preg, rapid strep and influenza. ED EKG interpretation:  Rhythm: normal sinus rhythm; and regular . Rate (approx.): 76; Axis: right axis deviation; ST/T wave: normal.     Rapid strep and influenza negative, labs with elevated glucose and glucosuria but otherwise within normal limits. CXR, read by radiology and independently visualized and interpreted by myself, reveals no evidence of pneumonia or other acute cardiopulmonary abnormalities. Safe for discharge home, with PCP follow up this week and OTC cold medications as needed. Strict ED return precautions discussed and provided in writing at time of discharge. The patient verbalized understanding and agreement with this plan.

## 2018-02-04 NOTE — DISCHARGE INSTRUCTIONS
Fatigue: Care Instructions  Your Care Instructions    Fatigue is a feeling of tiredness, exhaustion, or lack of energy. You may feel fatigue because of too much or not enough activity. It can also come from stress, lack of sleep, boredom, and poor diet. Many medical problems, such as viral infections, can cause fatigue. Emotional problems, especially depression, are often the cause of fatigue. Fatigue is most often a symptom of another problem. Treatment for fatigue depends on the cause. For example, if you have fatigue because you have a certain health problem, treating this problem also treats your fatigue. If depression or anxiety is the cause, treatment may help. Follow-up care is a key part of your treatment and safety. Be sure to make and go to all appointments, and call your doctor if you are having problems. It's also a good idea to know your test results and keep a list of the medicines you take. How can you care for yourself at home? · Get regular exercise. But don't overdo it. Go back and forth between rest and exercise. · Get plenty of rest.  · Eat a healthy diet. Do not skip meals, especially breakfast.  · Reduce your use of caffeine, tobacco, and alcohol. Caffeine is most often found in coffee, tea, cola drinks, and chocolate. · Limit medicines that can cause fatigue. This includes tranquilizers and cold and allergy medicines. When should you call for help? Watch closely for changes in your health, and be sure to contact your doctor if:  ? · You have new symptoms such as fever or a rash. ? · Your fatigue gets worse. ? · You have been feeling down, depressed, or hopeless. Or you may have lost interest in things that you usually enjoy. ? · You are not getting better as expected. Where can you learn more? Go to http://kirstin-arsenio.info/. Enter L818 in the search box to learn more about \"Fatigue: Care Instructions. \"  Current as of: March 20, 2017  Content Version: 11.4  © 5701-1700 [employer]. Care instructions adapted under license by [employer] (which disclaims liability or warranty for this information). If you have questions about a medical condition or this instruction, always ask your healthcare professional. [address] any warranty or liability for your use of this information.

## 2018-02-05 LAB
ATRIAL RATE: 76 BPM
CALCULATED P AXIS, ECG09: 75 DEGREES
CALCULATED R AXIS, ECG10: 168 DEGREES
CALCULATED T AXIS, ECG11: 59 DEGREES
DIAGNOSIS, 93000: NORMAL
P-R INTERVAL, ECG05: 150 MS
Q-T INTERVAL, ECG07: 372 MS
QRS DURATION, ECG06: 94 MS
QTC CALCULATION (BEZET), ECG08: 418 MS
VENTRICULAR RATE, ECG03: 76 BPM

## 2018-02-06 LAB
BACTERIA SPEC CULT: NORMAL
SERVICE CMNT-IMP: NORMAL

## 2018-04-10 ENCOUNTER — OFFICE VISIT (OUTPATIENT)
Dept: OBGYN CLINIC | Age: 28
End: 2018-04-10

## 2018-04-10 VITALS
WEIGHT: 140 LBS | HEIGHT: 62 IN | SYSTOLIC BLOOD PRESSURE: 104 MMHG | BODY MASS INDEX: 25.76 KG/M2 | DIASTOLIC BLOOD PRESSURE: 60 MMHG

## 2018-04-10 DIAGNOSIS — N76.0 VAGINITIS AND VULVOVAGINITIS: ICD-10-CM

## 2018-04-10 DIAGNOSIS — Z01.419 ENCOUNTER FOR GYNECOLOGICAL EXAMINATION (GENERAL) (ROUTINE) WITHOUT ABNORMAL FINDINGS: Primary | ICD-10-CM

## 2018-04-10 RX ORDER — DESOGESTREL AND ETHINYL ESTRADIOL 0.15-0.03
1 KIT ORAL DAILY
Qty: 3 DOSE PACK | Refills: 1 | Status: SHIPPED | OUTPATIENT
Start: 2018-04-10 | End: 2018-11-25 | Stop reason: SDUPTHER

## 2018-04-10 RX ORDER — METRONIDAZOLE 7.5 MG/G
1 GEL VAGINAL
Qty: 187.5 MG | Refills: 0 | Status: SHIPPED | OUTPATIENT
Start: 2018-04-10 | End: 2018-12-13 | Stop reason: SDUPTHER

## 2018-04-10 NOTE — PROGRESS NOTES
Annual exam/Problem visit ages 21-44    Dixon Rivas is a ,  32 y.o. female [address] Patient's last menstrual period was 2018 (approximate). Failed to restart OCP after LMP and had unprotected IC once -- about the . She presents for her annual checkup. Also a problem--see below. With regard to the Gardasil vaccine, she has not received it yet. Menstrual status:    Her periods are light, moderate in flow. She is using one to two pads or tampons per day, usually regular and last 26-30 days. She denies dysmenorrhea. She reports no premenstrual symptoms. Contraception:    The current method of family planning is OCP (estrogen/progesterone). Sexual history:     She  reports that she currently engages in sexual activity and has had male partners. She reports using the following method of birth control/protection: Pill. .    Medical conditions:    Since her last annual GYN exam about two years ago, she has not the following changes in her health history: none. Pap and Mammogram History:    Her most recent Pap smear was normal, obtained 2 1/2 year(s) ago.     The patient has never had a mammogram.    Breast Cancer History/Substance Abuse: negative    Past Medical History:   Diagnosis Date    DM (diabetes mellitus) ([facility] [address] 2012    Type II    Essential hypertension     hx of pre-e with previous pregnancy    HPV test positive 11    Neg 16&18    Hypercholesterolemia     Migraine     Mild depression ([facility] [address] 2012    Pap smear for cervical cancer screening 13;09/28/15    Normal Pap, Neg GC    Postpartum depression 2012    Vitamin D deficiency      Past Surgical History:   Procedure Laterality Date     DELIVERY ONLY      c/s for LGA     HX  SECTION  ;2016    x2       Current Outpatient Prescriptions   Medication Sig Dispense Refill    BD INSULIN SYRINGE ULTRA-FINE 0.3 mL 31 gauge x 5/16 syrg USE 3 TIMES A  Syringe 11    desogestrel-ethinyl estradiol (DESOGEN) 0.15-0.03 mg tab Take 1 Tab by mouth daily. 3 Dose Pack 1    NOVOLOG FLEXPEN 100 unit/mL inpn INJECT 30 TO 35 UNITS 3 TIMES A DAY BEFORE MEALS 33 Adjustable Dose Pre-filled Pen Syringe 11    LANTUS SOLOSTAR 100 unit/mL (3 mL) inpn INJECT 25 UNITS UNDER THE SKIN IN THE EVENING 15 mL 3    BD INSULIN SYRINGE ULTRA-FINE 1/2 mL 31 x 5/16\" syrg USE THREE TIMES DAILY 100 Syringe 11    glucose blood VI test strips (ONETOUCH ULTRA TEST) strip Test blood glucose 4 daily Dx Code: E11.65 200 Strip 11    Blood-Glucose Meter (ONETOUCH ULTRAMINI) monitoring kit Test blood glucose 4 times daily Dx Code: E11.65 1 Kit 0    Lancets misc Test blood glucose 4 times daily Dx Code: E11.65 200 Each 11    nitrofurantoin, macrocrystal-monohydrate, (MACROBID) 100 mg capsule Take 1 Cap by mouth two (2) times a day. 14 Cap 0    norgestimate-ethinyl estradiol ([facility], 28,) 0.25-35 mg-mcg tab Take 1 Tab by mouth daily. 3 Dose Pack 1     Allergies: Review of patient's allergies indicates no known allergies. Tobacco History:  reports that she has never smoked. She has never used smokeless tobacco.  Alcohol Abuse:  reports that she does not drink alcohol. Drug Abuse:  reports that she does not use illicit drugs.     Family Medical/Cancer History:   Family History   Problem Relation Age of Onset    Diabetes Mother      Type II    Diabetes Sister 25     Type II        Review of Systems - History obtained from the patient  Constitutional: negative for weight loss, fever, night sweats  HEENT: negative for hearing loss, earache, congestion, snoring, sorethroat  CV: negative for chest pain, palpitations, edema  Resp: negative for cough, shortness of breath, wheezing  GI: negative for change in bowel habits, abdominal pain, black or bloody stools  : negative for frequency, dysuria, hematuria, vaginal discharge  MSK: negative for back pain, joint pain, muscle pain  Breast: negative for breast lumps, nipple discharge, galactorrhea  Skin :negative for itching, rash, hives  Neuro: negative for dizziness, headache, confusion, weakness  Psych: negative for anxiety, depression, change in mood  Heme/lymph: negative for bleeding, bruising, pallor    Physical Exam    Visit Vitals    /60    Ht 5' 2\" (1.575 m)    Wt 140 lb (63.5 kg)    LMP 03/16/2018 (Approximate)    BMI 25.61 kg/m2       Constitutional  · Appearance: well-nourished, well developed, alert, in no acute distress    HENT  · Head and Face: appears normal    Neck  · Inspection/Palpation: normal appearance, no masses or tenderness  · Lymph Nodes: no lymphadenopathy present  · Thyroid: gland size normal, nontender, no nodules or masses present on palpation    Chest  · Respiratory Effort: breathing unlabored  · Auscultation: normal breath sounds    Cardiovascular  · Heart:  · Auscultation: regular rate and rhythm without murmur    Breasts  · Inspection of Breasts: breasts symmetrical, no skin changes, no discharge present, nipple appearance normal, no skin retraction present  · Palpation of Breasts and Axillae: no masses present on palpation, no breast tenderness  · Axillary Lymph Nodes: no lymphadenopathy present    Gastrointestinal  · Abdominal Examination: abdomen non-tender to palpation, normal bowel sounds, no masses present  · Liver and spleen: no hepatomegaly present, spleen not palpable  · Hernias: no hernias identified    Genitourinary  · External Genitalia: normal appearance for age, no discharge present, no tenderness present, no inflammatory lesions present, no masses present, no atrophy present  · Vagina: normal vaginal vault without central or paravaginal defects, tan, runny discharge present, no inflammatory lesions present, no masses present  · Bladder: non-tender to palpation  · Urethra: appears normal  · Cervix: normal   · Uterus: normal size, shape and consistency  · Adnexa: no adnexal tenderness present, no adnexal masses present  · Perineum: perineum within normal limits, no evidence of trauma, no rashes or skin lesions present  · Anus: anus within normal limits, no hemorrhoids present  · Inguinal Lymph Nodes: no lymphadenopathy present    Skin  · General Inspection: no rash, no lesions identified    Neurologic/Psychiatric  · Mental Status:  · Orientation: grossly oriented to person, place and time  · Mood and Affect: mood normal, affect appropriate    . Assessment:  Routine gynecologic examination  Her overall medical status is satisfactory except for gynecologic issues as below. Plan:  Counseled re: diet, exercise, healthy lifestyle  Return for yearly wellness visits  Gardisil counseling provided  Pt counseled regarding co-testing for high risk HPV with pap  Rec screening mammo at either 35 or 40     Problem visit    Subjective:  She is having vaginitis symptoms-itching,irritation and a mild odor. Glucose control is \"ok\". No recent ATB. Prefers metrogel to oral Flagyl    Objective:  See above--discharge consistent with BV    Assessment:  Vaginitis    Plan:  Rx Metrogel pending NS+ results.

## 2018-04-10 NOTE — PATIENT INSTRUCTIONS

## 2018-04-11 LAB
CYTOLOGIST CVX/VAG CYTO: NORMAL
CYTOLOGY CVX/VAG DOC THIN PREP: NORMAL
CYTOLOGY HISTORY:: NORMAL
DX ICD CODE: NORMAL
LABCORP, 190119: NORMAL
Lab: NORMAL
OTHER STN SPEC: NORMAL
PATH REPORT.FINAL DX SPEC: NORMAL
STAT OF ADQ CVX/VAG CYTO-IMP: NORMAL

## 2018-07-11 RX ORDER — INSULIN GLARGINE 100 [IU]/ML
INJECTION, SOLUTION SUBCUTANEOUS
Qty: 15 ML | Refills: 11 | Status: SHIPPED | OUTPATIENT
Start: 2018-07-11 | End: 2018-12-11 | Stop reason: SDUPTHER

## 2018-08-10 ENCOUNTER — OFFICE VISIT (OUTPATIENT)
Dept: OBGYN CLINIC | Age: 28
End: 2018-08-10

## 2018-08-10 VITALS
SYSTOLIC BLOOD PRESSURE: 104 MMHG | HEIGHT: 62 IN | WEIGHT: 140 LBS | BODY MASS INDEX: 25.76 KG/M2 | DIASTOLIC BLOOD PRESSURE: 60 MMHG

## 2018-08-10 DIAGNOSIS — N89.8 FOUL SMELLING VAGINAL DISCHARGE: Primary | ICD-10-CM

## 2018-08-10 RX ORDER — METRONIDAZOLE 7.5 MG/G
1 GEL VAGINAL
Qty: 187.5 MG | Refills: 3 | Status: SHIPPED | OUTPATIENT
Start: 2018-08-10 | End: 2018-08-15

## 2018-08-10 NOTE — PROGRESS NOTES
Vaginitis evaluation    Chief Complaint   Vaginitis      HPI  29 y.o. female complains of foul vaginal discharge for 14 days. No LMP recorded. She denies additional symptoms at this time. The patient denies aggravating factors. She is not concerned about possible STI exposure at this time. She denies exposure to new chemicals or hygenic agents  Previous treatment included: none    Past Medical History:   Diagnosis Date    DM (diabetes mellitus) ([facility] 2012    Type II    Essential hypertension     hx of pre-e with previous pregnancy    HPV test positive 11    Neg 16&18    Hypercholesterolemia     Migraine     Mild depression ([facility] [address] 2012    Pap smear for cervical cancer screening 13;09/28/15    Normal Pap, Neg GC    Postpartum depression 2012    Vitamin D deficiency      Past Surgical History:   Procedure Laterality Date     DELIVERY ONLY      c/s for LGA     HX  SECTION  ;2016    x2     Social History     Occupational History    Not on file. Social History Main Topics    Smoking status: Never Smoker    Smokeless tobacco: Never Used    Alcohol use No    Drug use: No    Sexual activity: Yes     Partners: Male     Birth control/ protection: Pill     Family History   Problem Relation Age of Onset    Diabetes Mother      Type II    Diabetes Sister 25     Type II        No Known Allergies  Prior to Admission medications    Medication Sig Start Date End Date Taking? Authorizing Provider   LANTUS SOLOSTAR U-100 INSULIN 100 unit/mL (3 mL) inpn INJECT 25 UNITS UNDER THE SKIN IN THE EVENING 18   C Skylar Murry MD   desogestrel-ethinyl estradiol (DESOGEN) 0.15-0.03 mg tab Take 1 Tab by mouth daily.  4/10/18   Rocky Garcia MD   BD INSULIN SYRINGE ULTRA-FINE 0.3 mL 31 gauge x  syrg USE 3 TIMES A DAY 3/2/18   JORDAN Murry MD   NOVOLOG FLEXPEN 100 unit/mL inpn INJECT 30 TO 35 UNITS 3 TIMES A DAY BEFORE MEALS 10/16/17   JORDAN Murry MD nitrofurantoin, macrocrystal-monohydrate, (MACROBID) 100 mg capsule Take 1 Cap by mouth two (2) times a day. 8/23/17   Nimisha Amaya MD   norgestimate-ethinyl estradiol ([facility], ,) 0.25-35 mg-mcg tab Take 1 Tab by mouth daily. 5/24/16   Nimisha Amaya MD   BD INSULIN SYRINGE ULTRA-FINE 1/2 mL 31 x 5/16\" syrg USE THREE TIMES DAILY 1/7/16   JORDAN Ray MD   glucose blood VI test strips [facility] ULTRA TEST) strip Test blood glucose 4 daily Dx Code: E11.65 11/17/15   Tnaya Gamboa MD   Blood-Glucose Meter (ONETOUCH ULTRAMINI) monitoring kit Test blood glucose 4 times daily Dx Code: E11.65 11/17/15   Tanya Gamboa MD   Lancets misc Test blood glucose 4 times daily Dx Code: E11.65 11/17/15   Tanya Gamboa MD                      Review of Systems - History obtained from the patient  Constitutional: negative for weight loss, fever, night sweats  Breast: negative for breast lumps, nipple discharge, galactorrhea  GI: negative for change in bowel habits, abdominal pain, black or bloody stools  : negative for frequency, dysuria, hematuria  MSK: negative for back pain, joint pain, muscle pain  Skin: negative for itching, rash, hives  Neuro: negative for dizziness, headache, confusion, weakness  Psych: negative for anxiety, depression, change in mood  Heme/lymph: negative for bleeding, bruising, pallor       Objective:    Visit Vitals    /60    Ht 5' 2\" (1.575 m)    Wt 140 lb (63.5 kg)    BMI 25.61 kg/m2       Physical Exam:   PHYSICAL EXAMINATION    Constitutional  · Appearance: well-nourished, well developed, alert, in no acute distress    HENT  · Head and Face: appears normal    Genitourinary  · External Genitalia: normal appearance for age, tan discharge present, no tenderness present, no inflammatory lesions present, no masses present, no atrophy present  · Vagina:   Tan, odorous discharge present, otherwise normal vaginal vault without central or paravaginal defects, no inflammatory lesions present, no masses present  · Bladder: non-tender to palpation  · Urethra: appears normal  · Cervix: normal   · Uterus: normal size, shape and consistency  · Adnexa: no adnexal tenderness present, no adnexal masses present  · Perineum: perineum within normal limits, no evidence of trauma, no rashes or skin lesions present  · Anus: anus within normal limits, no hemorrhoids present  · Inguinal Lymph Nodes: no lymphadenopathy present    Skin  · General Inspection: no rash, no lesions identified    Neurologic/Psychiatric  · Mental Status:  · Orientation: grossly oriented to person, place and time  · Mood and Affect: mood normal, affect appropriate      No results found for any visits on 08/10/18. Assessment:   bacterial vaginosis clinically--again    Plan:   Treatment: MetroGel daily x 5 days and then once a week for 14 weeks    ROV prn if symptoms persist or worsen.

## 2018-08-15 LAB
A VAGINAE DNA VAG QL NAA+PROBE: ABNORMAL SCORE
BVAB2 DNA VAG QL NAA+PROBE: ABNORMAL SCORE
C ALBICANS DNA VAG QL NAA+PROBE: NEGATIVE
C GLABRATA DNA VAG QL NAA+PROBE: NEGATIVE
MEGA1 DNA VAG QL NAA+PROBE: ABNORMAL SCORE

## 2018-09-12 ENCOUNTER — TELEPHONE (OUTPATIENT)
Dept: OBGYN CLINIC | Age: 28
End: 2018-09-12

## 2018-09-12 NOTE — TELEPHONE ENCOUNTER
[employer] to verify insurance information as they have reached out to our office via fax with no success. Information verified and Murray Book will take care of the billing part. No Medical information given just verification of insurance.

## 2018-11-28 RX ORDER — DESOGESTREL AND ETHINYL ESTRADIOL 0.15-0.03
KIT ORAL
Qty: 4 DOSE PACK | Refills: 1 | Status: SHIPPED | OUTPATIENT
Start: 2018-11-28 | End: 2018-12-11

## 2019-02-07 ENCOUNTER — OFFICE VISIT (OUTPATIENT)
Dept: ONCOLOGY | Age: 29
End: 2019-02-07

## 2019-02-07 VITALS
RESPIRATION RATE: 20 BRPM | HEART RATE: 80 BPM | DIASTOLIC BLOOD PRESSURE: 66 MMHG | SYSTOLIC BLOOD PRESSURE: 109 MMHG | BODY MASS INDEX: 25.95 KG/M2 | TEMPERATURE: 97.7 F | WEIGHT: 141 LBS | OXYGEN SATURATION: 100 % | HEIGHT: 62 IN

## 2019-02-07 DIAGNOSIS — N92.4 EXCESSIVE BLEEDING IN PREMENOPAUSAL PERIOD: ICD-10-CM

## 2019-02-07 DIAGNOSIS — R53.83 FATIGUE, UNSPECIFIED TYPE: ICD-10-CM

## 2019-02-07 DIAGNOSIS — R70.0 ELEVATED SED RATE: ICD-10-CM

## 2019-02-07 DIAGNOSIS — R61 NIGHT SWEATS: Primary | ICD-10-CM

## 2019-02-07 DIAGNOSIS — R79.82 CRP ELEVATED: ICD-10-CM

## 2019-02-07 RX ORDER — OMEPRAZOLE 40 MG/1
CAPSULE, DELAYED RELEASE ORAL
Refills: 0 | COMMUNITY
Start: 2019-01-09 | End: 2021-11-10 | Stop reason: SDUPTHER

## 2019-02-07 RX ORDER — IBUPROFEN 200 MG
TABLET ORAL
COMMUNITY
End: 2020-02-12

## 2019-02-07 NOTE — PROGRESS NOTES
[facility] [place] [facility] [address] [facility], [address] [place] [address] W: [phone]  F: [phone] Reason for Visit:  
Ruperto Vale is a 29 y.o. female who is seen in consultation at the request of Dr. Sloan Pal for evaluation of elevated ESR and CRP. History of Present Illness:  
Ruperto Vale is a pleasant 29 y.o. female who presents today for evaluation of elevated inflammatory markers. She reports a number of complaints today. She has had fatigue \"forever. \"  She notes chronic headaches. Considerable muscle pains, primarily in neck, back, arms, and legs. Occasional night sweats. No fevers, chills, weight loss, or adenopathy. Some constipation. Occasional red rash over chest, pruritic. Frequent vaginal infections, but no other recurrent or recent infections. She denies family history of hematologic disorders or cancer, though the father's side of her family is not known. She was seen by rheumatology for the above symptoms, and their labwork was essentially unremarkable with the exception of elevated ESR and CRP. They have asked me to clear her from a cancer perspective, prior to initiating therapy with steroids for a presumed inflammatory disorder. She is accompanied by her aunt, who raised her. She works as a massage therapist. 
 
 
Past Medical History:  
Diagnosis Date  DM (diabetes mellitus) ([facility] 2012 Type II  
 Essential hypertension   
 hx of pre-e with previous pregnancy  HPV test positive 11 Neg 16&18  Hypercholesterolemia  Migraine  Mild depression ([facility] 2012  Pap smear for cervical cancer screening 13;09/28/15 Normal Pap, Neg GC  Postpartum depression 2012  Vitamin D deficiency Past Surgical History:  
Procedure Laterality Date   DELIVERY ONLY    
 c/s for LGA  HX  SECTION  ;2016  
 x2 Social History Tobacco Use  
  Smoking status: Never Smoker  Smokeless tobacco: Never Used Substance Use Topics  Alcohol use: No  
  
Family History Problem Relation Age of Onset  Diabetes Mother Type II  
 Hypertension Mother  Diabetes Sister 25 Type II  
 Hypertension Sister Current Outpatient Medications Medication Sig  
 omeprazole (PRILOSEC) 40 mg capsule TAKE 1 CAPSULE BY MOUTH EVERY DAY  ibuprofen (ADVIL) 200 mg tablet Take  by mouth.  insulin glargine (LANTUS SOLOSTAR U-100 INSULIN) 100 unit/mL (3 mL) inpn 22 Units by SubCUTAneous route nightly.  APRI 0.15-0.03 mg tab TAKE 1 TABLET BY MOUTH EVERY DAY  
 NOVOLOG FLEXPEN U-100 INSULIN 100 unit/mL inpn **NOT COVERED/PA NEEDED**INJECT 30 TO 35 UNITS THREE TIMES A DAY, BEFORE MEALS  
 BD INSULIN SYRINGE ULTRA-FINE 0.3 mL 31 gauge x 5/16 syrg USE 3 TIMES A DAY  glucose blood VI test strips (ONETOUCH ULTRA TEST) strip Test blood glucose 4 daily Dx Code: E11.65  Blood-Glucose Meter (ONETOUCH ULTRAMINI) monitoring kit Test blood glucose 4 times daily Dx Code: E11.65  Lancets misc Test blood glucose 4 times daily Dx Code: E11.65 No current facility-administered medications for this visit. No Known Allergies Review of Systems: A complete review of systems was obtained, negative except as described above. Physical Exam:  
 
Visit Vitals /66 (BP 1 Location: Right arm, BP Patient Position: Sitting) Pulse 80 Temp 97.7 °F (36.5 °C) (Temporal) Resp 20 Ht 5' 2\" (1.575 m) Wt 141 lb (64 kg) SpO2 100% BMI 25.79 kg/m² General: No distress Eyes: PERRLA, anicteric sclerae HENT: Atraumatic, OP clear Neck: Supple Lymphatic: No cervical, supraclavicular, or inguinal adenopathy Respiratory: CTAB, normal respiratory effort CV: Normal rate, regular rhythm, no murmurs, no peripheral edema GI: Soft, nontender, nondistended, no masses, no hepatomegaly, no splenomegaly MS: Normal gait and station. Digits without clubbing or cyanosis. Skin: No rashes, ecchymoses, or petechiae. Normal temperature, turgor, and texture. Psych: Alert, oriented, appropriate affect, normal judgment/insight Results:  
 
Lab Results Component Value Date/Time WBC 6.9 02/04/2018 04:30 PM  
 HGB 13.5 02/04/2018 04:30 PM  
 HCT 40.9 02/04/2018 04:30 PM  
 PLATELET 076 25/84/4634 04:30 PM  
 MCV 88.0 02/04/2018 04:30 PM  
 ABS. NEUTROPHILS 3.0 02/04/2018 04:30 PM  
 Hemoglobin (POC) 12.9 01/16/2013 07:56 PM  
 HGB (POC) 12.4 09/22/2014 02:28 PM  
 Hematocrit (POC) 38 01/16/2013 07:56 PM  
 HCT (POC) 37.2 09/22/2014 02:28 PM  
 Hgb, External 10.1 02/17/2016 Hct, External 31.2 02/17/2016 Platelet cnt., External 315 02/17/2016 Lab Results Component Value Date/Time Sodium 140 12/11/2018 11:57 AM  
 Potassium 4.2 12/11/2018 11:57 AM  
 Chloride 102 12/11/2018 11:57 AM  
 CO2 25 12/11/2018 11:57 AM  
 Glucose 216 (H) 12/11/2018 11:57 AM  
 BUN 12 12/11/2018 11:57 AM  
 Creatinine 0.60 12/11/2018 11:57 AM  
 GFR est  12/11/2018 11:57 AM  
 GFR est non- 12/11/2018 11:57 AM  
 Calcium 9.0 12/11/2018 11:57 AM  
 Sodium (POC) 140 01/16/2013 07:56 PM  
 Potassium (POC) 3.5 01/16/2013 07:56 PM  
 Chloride (POC) 107 01/16/2013 07:56 PM  
 Glucose (POC) 85 04/14/2016 07:44 AM  
 BUN (POC) 8 (L) 01/16/2013 07:56 PM  
 Creatinine (POC) 0.5 (L) 01/16/2013 07:56 PM  
 Calcium, ionized (POC) 1.20 01/16/2013 07:56 PM  
 
Lab Results Component Value Date/Time Bilirubin, total <0.2 12/11/2018 11:57 AM  
 ALT (SGPT) 14 12/11/2018 11:57 AM  
 AST (SGOT) 12 12/11/2018 11:57 AM  
 Alk. phosphatase 80 12/11/2018 11:57 AM  
 Protein, total 6.7 12/11/2018 11:57 AM  
 Albumin 4.3 12/11/2018 11:57 AM  
 Globulin 4.3 (H) 02/04/2018 04:30 PM  
 
Lab Results Component Value Date/Time   04/11/2016 02:06 AM  
 TSH 1.040 10/16/2014 03:50 PM  
 HIV 1/O/2 Abs <1.00 09/28/2015 02:52 PM  
 
 
 12/14/2018 WBC: 6.4 HGB: 11.7 PLT: 380 Creatinine: 0.5 Glucose: 270 TBili: 0.2 AST, ALT, ALP: wnl ESR: 83 (H) CRP: 11.4 (H) CXR 2/4/2018: no acute pulmonary process Records reviewed and summarized above. Assessment:  
1) Elevated inflammatory markers (ESR and CRP) These labs are suggestive of inflammation, though the cause remains unclear based on her history and labwork to date. She has undergone evaluation with rheumatology, and no obvious autoimmune disorder has been uncovered. No symptoms present to suggest infection. Malignancy remains a possible cause of these elevated labs, though it does seem less likely based on her H&P, age, and labwork. She is uptodate on cancer screening for age (pap smear). I will check a few additional labs to further evaluate. We also discussed the option of a CT to assess for lymphoma or other malignancy. I would not necessarily recommend this for an isolated elevated inflammatory marker, but given the night sweats and fatigue, I think it is reasonable to pursue this. We will follow up to review the results of these tests. 2) DM On insulin Plan: · Additional labs today ([facility]) · CT C/A/P · Return to see me to review results I appreciate the opportunity to participate in Ms. Pierce Snowball care.  
 
Signed By: Loyda Mccoy MD

## 2019-02-12 LAB
ALBUMIN SERPL ELPH-MCNC: 3.5 G/DL (ref 2.9–4.4)
ALBUMIN/GLOB SERPL: 1 {RATIO} (ref 0.7–1.7)
ALPHA1 GLOB SERPL ELPH-MCNC: 0.3 G/DL (ref 0–0.4)
ALPHA2 GLOB SERPL ELPH-MCNC: 1 G/DL (ref 0.4–1)
APTT PPP: 28 SEC (ref 24–33)
B-GLOBULIN SERPL ELPH-MCNC: 1.3 G/DL (ref 0.7–1.3)
BASOPHILS # BLD AUTO: 0 X10E3/UL (ref 0–0.2)
BASOPHILS NFR BLD AUTO: 0 %
EOSINOPHIL # BLD AUTO: 0.1 X10E3/UL (ref 0–0.4)
EOSINOPHIL NFR BLD AUTO: 1 %
ERYTHROCYTE [DISTWIDTH] IN BLOOD BY AUTOMATED COUNT: 12.8 % (ref 12.3–15.4)
ERYTHROCYTE [SEDIMENTATION RATE] IN BLOOD BY WESTERGREN METHOD: 55 MM/HR (ref 0–32)
FACT VIII ACT/NOR PPP: 108 % (ref 57–163)
GAMMA GLOB SERPL ELPH-MCNC: 0.9 G/DL (ref 0.4–1.8)
GLOBULIN SER CALC-MCNC: 3.6 G/DL (ref 2.2–3.9)
HCT VFR BLD AUTO: 35.8 % (ref 34–46.6)
HGB BLD-MCNC: 11.4 G/DL (ref 11.1–15.9)
IGA SERPL-MCNC: 267 MG/DL (ref 87–352)
IGG SERPL-MCNC: 972 MG/DL (ref 700–1600)
IGM SERPL-MCNC: 92 MG/DL (ref 26–217)
IMM GRANULOCYTES # BLD AUTO: 0 X10E3/UL (ref 0–0.1)
IMM GRANULOCYTES NFR BLD AUTO: 0 %
INR PPP: 1 (ref 0.8–1.2)
LDH SERPL-CCNC: 108 IU/L (ref 119–226)
LYMPHOCYTES # BLD AUTO: 2.5 X10E3/UL (ref 0.7–3.1)
LYMPHOCYTES NFR BLD AUTO: 34 %
M PROTEIN SERPL ELPH-MCNC: NORMAL G/DL
MCH RBC QN AUTO: 27.5 PG (ref 26.6–33)
MCHC RBC AUTO-ENTMCNC: 31.8 G/DL (ref 31.5–35.7)
MCV RBC AUTO: 87 FL (ref 79–97)
MONOCYTES # BLD AUTO: 0.5 X10E3/UL (ref 0.1–0.9)
MONOCYTES NFR BLD AUTO: 7 %
NEUTROPHILS # BLD AUTO: 4.2 X10E3/UL (ref 1.4–7)
NEUTROPHILS NFR BLD AUTO: 58 %
PATH INTERP BLD-IMP: ABNORMAL
PATH INTERP BLD-IMP: NORMAL
PATH REV BLD -IMP: ABNORMAL
PATHOLOGIST NAME: ABNORMAL
PLATELET # BLD AUTO: 399 X10E3/UL (ref 150–379)
PLEASE NOTE, 011150: NORMAL
PROT PATTERN SERPL IFE-IMP: NORMAL
PROT SERPL-MCNC: 7.1 G/DL (ref 6–8.5)
PROTHROMBIN TIME: 9.9 SEC (ref 9.1–12)
RBC # BLD AUTO: 4.14 X10E6/UL (ref 3.77–5.28)
VWF AG ACT/NOR PPP IA: 135 % (ref 50–200)
VWF:RCO ACT/NOR PPP PL AGG: 77 % (ref 50–200)
WBC # BLD AUTO: 7.3 X10E3/UL (ref 3.4–10.8)

## 2019-02-15 ENCOUNTER — DOCUMENTATION ONLY (OUTPATIENT)
Dept: ONCOLOGY | Age: 29
End: 2019-02-15

## 2019-02-15 NOTE — PROGRESS NOTES
CT chest/abd/pelvis approved following peer to peer. Authorization with auth # to be faxed within the hour. Please notify .

## 2019-02-22 ENCOUNTER — HOSPITAL ENCOUNTER (OUTPATIENT)
Dept: CT IMAGING | Age: 29
Discharge: HOME | End: 2019-02-22
Attending: INTERNAL MEDICINE
Payer: MEDICAID

## 2019-02-22 DIAGNOSIS — R53.83 FATIGUE, UNSPECIFIED TYPE: ICD-10-CM

## 2019-02-22 DIAGNOSIS — R70.0 ELEVATED SED RATE: ICD-10-CM

## 2019-02-22 DIAGNOSIS — R79.82 CRP ELEVATED: ICD-10-CM

## 2019-02-22 DIAGNOSIS — R61 NIGHT SWEATS: ICD-10-CM

## 2019-02-22 PROCEDURE — 74011636320 HC RX REV CODE- 636/320: Performed by: RADIOLOGY

## 2019-02-22 PROCEDURE — 74177 CT ABD & PELVIS W/CONTRAST: CPT

## 2019-02-22 RX ADMIN — IOPAMIDOL 100 ML: 755 INJECTION, SOLUTION INTRAVENOUS at 14:56

## 2019-02-23 NOTE — PROGRESS NOTES
Cancer [place] at [address], [address] [facility]  Darylene Mems: [phone]  F: [phone]      Reason for Visit:   Dwain Law is a 29 y.o. female who is seen for follow up of elevated ESR and CRP. History of Present Illness:   She returns today to discuss her test results. She reports persistent headaches and muscle pain. Still with considerable fatigue. Now with some mild diarrhea, about twice a day. She is unaccompanied today. She works as a massage therapist.    PAST HISTORY: The following sections were reviewed and updated in the EMR as appropriate: PMH, SH, FH, Medications, Allergies. No Known Allergies   Review of Systems: A complete review of systems was obtained, reviewed, and scanned into the EMR. Pertinent findings reviewed above. Physical Exam:     Visit Vitals  /62 (BP 1 Location: Left arm, BP Patient Position: Sitting)   Pulse 75   Temp 97.8 °F (36.6 °C) (Oral)   Resp 20   Ht 5' 2\" (1.575 m)   Wt 138 lb 12.8 oz (63 kg)   SpO2 99%   BMI 25.39 kg/m²     General: No distress  Eyes: PERRLA, anicteric sclerae  HENT: Atraumatic, OP clear  Neck: Supple  Lymphatic: No cervical, supraclavicular, or inguinal adenopathy  Respiratory: CTAB, normal respiratory effort  CV: Normal rate, regular rhythm, no murmurs, no peripheral edema  GI: Soft, nontender, nondistended, no masses, no hepatomegaly, no splenomegaly  MS: Normal gait and station. Digits without clubbing or cyanosis. Skin: No rashes, ecchymoses, or petechiae. Normal temperature, turgor, and texture. Psych: Alert, oriented, appropriate affect, normal judgment/insight    Results:     Lab Results   Component Value Date/Time    WBC 7.3 02/07/2019 12:18 PM    HGB 11.4 02/07/2019 12:18 PM    HCT 35.8 02/07/2019 12:18 PM    PLATELET 713 (H) 57/78/9996 12:18 PM    MCV 87 02/07/2019 12:18 PM    ABS.  NEUTROPHILS 4.2 02/07/2019 12:18 PM    Hemoglobin (POC) 12.9 01/16/2013 07:56 PM HGB (POC) 12.4 09/22/2014 02:28 PM    Hematocrit (POC) 38 01/16/2013 07:56 PM    HCT (POC) 37.2 09/22/2014 02:28 PM    Hgb, External 10.1 02/17/2016    Hct, External 31.2 02/17/2016    Platelet cnt., External 315 02/17/2016     Lab Results   Component Value Date/Time    Sodium 140 12/11/2018 11:57 AM    Potassium 4.2 12/11/2018 11:57 AM    Chloride 102 12/11/2018 11:57 AM    CO2 25 12/11/2018 11:57 AM    Glucose 216 (H) 12/11/2018 11:57 AM    BUN 12 12/11/2018 11:57 AM    Creatinine 0.60 12/11/2018 11:57 AM    GFR est  12/11/2018 11:57 AM    GFR est non- 12/11/2018 11:57 AM    Calcium 9.0 12/11/2018 11:57 AM    Sodium (POC) 140 01/16/2013 07:56 PM    Potassium (POC) 3.5 01/16/2013 07:56 PM    Chloride (POC) 107 01/16/2013 07:56 PM    Glucose (POC) 85 04/14/2016 07:44 AM    BUN (POC) 8 (L) 01/16/2013 07:56 PM    Creatinine (POC) 0.5 (L) 01/16/2013 07:56 PM    Calcium, ionized (POC) 1.20 01/16/2013 07:56 PM     Lab Results   Component Value Date/Time    Bilirubin, total <0.2 12/11/2018 11:57 AM    ALT (SGPT) 14 12/11/2018 11:57 AM    AST (SGOT) 12 12/11/2018 11:57 AM    Alk. phosphatase 80 12/11/2018 11:57 AM    Protein, total 7.1 02/07/2019 12:18 PM    Albumin 4.3 12/11/2018 11:57 AM    Globulin 4.3 (H) 02/04/2018 04:30 PM     Lab Results   Component Value Date/Time     (L) 02/07/2019 12:18 PM    Sed rate (ESR) 55 (H) 02/07/2019 12:18 PM    TSH 1.040 10/16/2014 03:50 PM    M-Adam Not Observed 02/07/2019 12:18 PM    HIV 1/O/2 Abs <1.00 09/28/2015 02:52 PM       12/14/2018  WBC: 6.4  HGB: 11.7  PLT: 380  Creatinine: 0.5  Glucose: 270  TBili: 0.2  AST, ALT, ALP: wnl  ESR: 83 (H)  CRP: 11.4 (H)    2/7/2019  Smear: unremarkable  Von Willebrand Panel: negative    CXR 2/4/2018: no acute pulmonary process    CT C/A/P 2/22/2019: negative    Assessment:   1) Elevated inflammatory markers (ESR and CRP)  These labs are suggestive of inflammation, though the cause remains unclear.   She has undergone evaluation with rheumatology, and no obvious autoimmune disorder has been uncovered. No symptoms present to suggest infection. She is uptodate on cancer screening. I obtained a pan-CT and there is no evidence of malignancy. Additional labs are unremarkable. I recommend she proceed with treatment with rheumatology, as I see no hematologic or oncologic contraindication. 2) Thrombocytosis  Mild with . Consistent with inflammation. Monitor.     3) DM  On insulin    Plan:     · Follow up with rheumatology  · Return to see me as needed      Signed By: Latonya Rose MD

## 2019-03-01 ENCOUNTER — OFFICE VISIT (OUTPATIENT)
Dept: ONCOLOGY | Age: 29
End: 2019-03-01

## 2019-03-01 VITALS
HEART RATE: 75 BPM | HEIGHT: 62 IN | OXYGEN SATURATION: 99 % | SYSTOLIC BLOOD PRESSURE: 105 MMHG | DIASTOLIC BLOOD PRESSURE: 62 MMHG | BODY MASS INDEX: 25.54 KG/M2 | TEMPERATURE: 97.8 F | RESPIRATION RATE: 20 BRPM | WEIGHT: 138.8 LBS

## 2019-03-01 DIAGNOSIS — R79.82 CRP ELEVATED: Primary | ICD-10-CM

## 2019-03-01 DIAGNOSIS — R70.0 ELEVATED SED RATE: ICD-10-CM

## 2019-03-01 NOTE — PROGRESS NOTES
Kaushik Bush is a 29 y.o. female follow up for elevated ESR and CRP. 1. Have you been to the ER, urgent care clinic since your last visit? Hospitalized since your last visit? No      2. Have you seen or consulted any other health care providers outside of the [address] [place] [address] since your last visit? Include any pap smears or colon screening.  No

## 2019-04-09 ENCOUNTER — TELEPHONE (OUTPATIENT)
Dept: OBGYN CLINIC | Age: 29
End: 2019-04-09

## 2019-04-09 NOTE — TELEPHONE ENCOUNTER
Patient of [address] [place] [address] Dr Padgett. She has appointment for ultrasound for continous bleeding and follow up with you on  4/19/19 (Friday) . Patient is asking for refill of her Metrogel. She is out of RX and still continues with the blood discharge. No odor.       [facility]

## 2019-04-10 NOTE — TELEPHONE ENCOUNTER
04/10/19-- LM no specific details other than RX has been declined to fill now, needs to be seen.   Call prn

## 2019-04-10 NOTE — TELEPHONE ENCOUNTER
Tell her to wait until we see her. Blood usually washes BV out and the metrogel might cover up something else.

## 2019-05-07 ENCOUNTER — OFFICE VISIT (OUTPATIENT)
Dept: OBGYN CLINIC | Age: 29
End: 2019-05-07

## 2019-05-07 VITALS
DIASTOLIC BLOOD PRESSURE: 62 MMHG | SYSTOLIC BLOOD PRESSURE: 105 MMHG | HEIGHT: 62 IN | BODY MASS INDEX: 25.4 KG/M2 | WEIGHT: 138 LBS

## 2019-05-07 DIAGNOSIS — Z86.19 HISTORY OF CHLAMYDIA INFECTION: Primary | ICD-10-CM

## 2019-05-07 DIAGNOSIS — N93.8 DUB (DYSFUNCTIONAL UTERINE BLEEDING): ICD-10-CM

## 2019-05-07 NOTE — PROGRESS NOTES
Abnormal bleeding note      Libra Ibrahim is a 34 y.o. female who complains of vaginal bleeding problems. She recently went to there ER for bleeding and was advised she had a UTI and chlamydia. Her current method of family planning is OCP (estrogen/progesterone). Stopped OCP after last period, when she was at the ER. She developed this problem approximately 3 weeks ago. She has had vaginal bleeding which she describes as light, heavy lasting up to 3 days. Pad or tampon count: changes when needed    Associated symptoms include cramping. Alleviating factors: none    Aggravating factors: none      The patient is sexually active. Last Pap smear:was normal.    Her relevant past medical history:   Past Medical History:   Diagnosis Date    Chlamydia 2019    per pt    DM (diabetes mellitus) ([facility] [address] 2012    Type II    Essential hypertension     hx of pre-e with previous pregnancy    HPV test positive 11    Neg 16&18    Hypercholesterolemia     Migraine     Mild depression ([facility] 2012    Pap smear for cervical cancer screening 13;09/28/15    Normal Pap, Neg GC    Postpartum depression 2012    Vitamin D deficiency         Past Surgical History:   Procedure Laterality Date     DELIVERY ONLY      c/s for LGA     HX  SECTION  ;2016    x2     Social History     Occupational History    Not on file   Tobacco Use    Smoking status: Never Smoker    Smokeless tobacco: Never Used   Substance and Sexual Activity    Alcohol use: No    Drug use: No    Sexual activity: Yes     Partners: Male     Birth control/protection: Pill     Family History   Problem Relation Age of Onset    Diabetes Mother         Type II    Hypertension Mother     Diabetes Sister 25        Type II    Hypertension Sister        No Known Allergies  Prior to Admission medications    Medication Sig Start Date End Date Taking?  Authorizing Provider   omeprazole (PRILOSEC) 40 mg capsule TAKE 1 CAPSULE BY MOUTH EVERY DAY 1/9/19   Provider, Historical   ibuprofen (ADVIL) 200 mg tablet Take  by mouth. Provider, Historical   insulin glargine (LANTUS SOLOSTAR U-100 INSULIN) 100 unit/mL (3 mL) inpn 22 Units by SubCUTAneous route nightly.  12/11/18   Atif Taylor MD   APRI 0.15-0.03 mg tab TAKE 1 TABLET BY MOUTH EVERY DAY 11/26/18   Yomaira Leo MD   NOVOLOG FLEXPEN U-100 INSULIN 100 unit/mL inpn **NOT COVERED/PA NEEDED**INJECT 30 TO 35 UNITS THREE TIMES A DAY, BEFORE MEALS 10/22/18   Atif Taylor MD   BD INSULIN SYRINGE ULTRA-FINE 0.3 mL 31 gauge x 5/16 syrg USE 3 TIMES A DAY 3/2/18   Atif Taylor MD   glucose blood VI test strips [facility] ULTRA TEST) strip Test blood glucose 4 daily Dx Code: E11.65 11/17/15   Alexis Walters MD   Blood-Glucose Meter (ONETOUCH ULTRAMINI) monitoring kit Test blood glucose 4 times daily Dx Code: E11.65 11/17/15   Alexis Walters MD   Lancets misc Test blood glucose 4 times daily Dx Code: E11.65 11/17/15   Alexis Walters MD        Review of Systems - History obtained from the patient  Constitutional: negative for weight loss, fever, night sweats  HEENT: negative for hearing loss, earache, congestion, snoring, sorethroat  CV: negative for chest pain, palpitations, edema  Resp: negative for cough, shortness of breath, wheezing  Breast: negative for breast lumps, nipple discharge, galactorrhea  GI: negative for change in bowel habits, abdominal pain, black or bloody stools  : negative for frequency, dysuria, hematuria  MSK: negative for back pain, joint pain, muscle pain  Skin: negative for itching, rash, hives  Neuro: negative for dizziness, headache, confusion, weakness  Psych: negative for anxiety, depression, change in mood  Heme/lymph: negative for bleeding, bruising, pallor      Objective:    Visit Vitals  /62   Ht 5' 2\" (1.575 m)   Wt 138 lb (62.6 kg)   LMP  (LMP Unknown)   BMI 25.24 kg/m²          PHYSICAL EXAMINATION    Constitutional  · Appearance: well-nourished, well developed, alert, in no acute distress    HENT  · Head and Face: appears normal    Neck  · Inspection/Palpation: normal appearance, no masses or tenderness  · Lymph Nodes: no lymphadenopathy present  · Thyroid: gland size normal, nontender, no nodules or masses present on palpation  ·   Breasts  · Inspection of Breasts: breasts symmetrical, no skin changes, no discharge present, nipple appearance normal, no skin retraction present  · Palpation of Breasts and Axillae: no masses present on palpation, no breast tenderness  · Axillary Lymph Nodes: no lymphadenopathy present    Gastrointestinal  · Abdominal Examination: abdomen non-tender to palpation, normal bowel sounds, no masses present  · Liver and spleen: no hepatomegaly present, spleen not palpable  · Hernias: no hernias identified    Genitourinary  · External Genitalia: normal appearance for age, no discharge present, no tenderness present, no inflammatory lesions present, no masses present, no atrophy present  · Vagina: normal vaginal vault without central or paravaginal defects, some discharge present, no inflammatory lesions present, no masses present  · Bladder: non-tender to palpation  · Urethra: appears normal  · Cervix: normal   · Uterus: normal size, shape and consistency  · Adnexa: no adnexal tenderness present, no adnexal masses present  · Perineum: perineum within normal limits, no evidence of trauma, no rashes or skin lesions present  · Anus: anus within normal limits, no hemorrhoids present  · Inguinal Lymph Nodes: no lymphadenopathy present    Skin  · General Inspection: no rash, no lesions identified    Neurologic/Psychiatric  · Mental Status:  · Orientation: grossly oriented to person, place and time  · Mood and Affect: mood normal, affect appropriate    Assessment:   Possible irregular bleeding due to stopping OCP. CT at ER 3 weeks ago. Took rx.     Plan:   Observe menses  ADONIS for CT.        Instructions given to pt. Handouts given to pt.

## 2019-05-09 ENCOUNTER — TELEPHONE (OUTPATIENT)
Dept: OBGYN CLINIC | Age: 29
End: 2019-05-09

## 2019-05-09 RX ORDER — NAPROXEN SODIUM 550 MG/1
550 TABLET ORAL 2 TIMES DAILY WITH MEALS
Qty: 30 TAB | Refills: 1 | Status: SHIPPED | OUTPATIENT
Start: 2019-05-09 | End: 2019-06-04 | Stop reason: SDUPTHER

## 2019-05-09 NOTE — TELEPHONE ENCOUNTER
Patient calling stating that she stopped OCPs 3 weeks ago and now having severe cramping that tylenol and motrin are not touching. She is requesting a rx for something stronger to help manage her cramping. She reports she was just seen in our office 2 days ago. Pharmacy on file is correct. Please advise.

## 2019-05-13 RX ORDER — DESOGESTREL AND ETHINYL ESTRADIOL 0.15-0.03
KIT ORAL
Qty: 4 DOSE PACK | Refills: 0 | Status: SHIPPED | OUTPATIENT
Start: 2019-05-13 | End: 2019-08-02 | Stop reason: SDUPTHER

## 2019-05-23 ENCOUNTER — APPOINTMENT (OUTPATIENT)
Dept: ULTRASOUND IMAGING | Age: 29
End: 2019-05-23
Attending: EMERGENCY MEDICINE
Payer: MEDICAID

## 2019-05-23 ENCOUNTER — HOSPITAL ENCOUNTER (EMERGENCY)
Age: 29
Discharge: HOME | End: 2019-05-23
Attending: EMERGENCY MEDICINE
Payer: MEDICAID

## 2019-05-23 VITALS
TEMPERATURE: 97.8 F | WEIGHT: 140 LBS | BODY MASS INDEX: 25.76 KG/M2 | SYSTOLIC BLOOD PRESSURE: 110 MMHG | RESPIRATION RATE: 16 BRPM | DIASTOLIC BLOOD PRESSURE: 59 MMHG | HEIGHT: 62 IN | OXYGEN SATURATION: 100 % | HEART RATE: 75 BPM

## 2019-05-23 DIAGNOSIS — A64 STI (SEXUALLY TRANSMITTED INFECTION): Primary | ICD-10-CM

## 2019-05-23 LAB
APPEARANCE UR: ABNORMAL
BACTERIA URNS QL MICRO: NEGATIVE /HPF
BILIRUB UR QL: NEGATIVE
CLUE CELLS VAG QL WET PREP: NORMAL
COLOR UR: ABNORMAL
EPITH CASTS URNS QL MICRO: ABNORMAL /LPF
GLUCOSE UR STRIP.AUTO-MCNC: 500 MG/DL
HCG UR QL: NEGATIVE
HGB UR QL STRIP: ABNORMAL
HYALINE CASTS URNS QL MICRO: ABNORMAL /LPF (ref 0–5)
KETONES UR QL STRIP.AUTO: NEGATIVE MG/DL
KOH PREP SPEC: NORMAL
LEUKOCYTE ESTERASE UR QL STRIP.AUTO: ABNORMAL
NITRITE UR QL STRIP.AUTO: NEGATIVE
PH UR STRIP: 6.5 [PH] (ref 5–8)
PROT UR STRIP-MCNC: 100 MG/DL
RBC #/AREA URNS HPF: >100 /HPF (ref 0–5)
SERVICE CMNT-IMP: NORMAL
SP GR UR REFRACTOMETRY: 1.02 (ref 1–1.03)
T VAGINALIS VAG QL WET PREP: NORMAL
UROBILINOGEN UR QL STRIP.AUTO: 1 EU/DL (ref 0.2–1)
WBC URNS QL MICRO: >100 /HPF (ref 0–4)

## 2019-05-23 PROCEDURE — 81025 URINE PREGNANCY TEST: CPT

## 2019-05-23 PROCEDURE — 81001 URINALYSIS AUTO W/SCOPE: CPT

## 2019-05-23 PROCEDURE — 74011250637 HC RX REV CODE- 250/637: Performed by: NURSE PRACTITIONER

## 2019-05-23 PROCEDURE — 74011250636 HC RX REV CODE- 250/636: Performed by: NURSE PRACTITIONER

## 2019-05-23 PROCEDURE — 76856 US EXAM PELVIC COMPLETE: CPT

## 2019-05-23 PROCEDURE — 76830 TRANSVAGINAL US NON-OB: CPT

## 2019-05-23 PROCEDURE — 96372 THER/PROPH/DIAG INJ SC/IM: CPT

## 2019-05-23 PROCEDURE — 87147 CULTURE TYPE IMMUNOLOGIC: CPT

## 2019-05-23 PROCEDURE — 87210 SMEAR WET MOUNT SALINE/INK: CPT

## 2019-05-23 PROCEDURE — 87491 CHLMYD TRACH DNA AMP PROBE: CPT

## 2019-05-23 PROCEDURE — 87086 URINE CULTURE/COLONY COUNT: CPT

## 2019-05-23 PROCEDURE — 99284 EMERGENCY DEPT VISIT MOD MDM: CPT

## 2019-05-23 RX ORDER — AZITHROMYCIN 250 MG/1
1000 TABLET, FILM COATED ORAL
Status: COMPLETED | OUTPATIENT
Start: 2019-05-23 | End: 2019-05-23

## 2019-05-23 RX ADMIN — LIDOCAINE HYDROCHLORIDE 250 MG: 10 INJECTION, SOLUTION EPIDURAL; INFILTRATION; INTRACAUDAL; PERINEURAL at 12:42

## 2019-05-23 RX ADMIN — AZITHROMYCIN 1000 MG: 250 TABLET, FILM COATED ORAL at 12:42

## 2019-05-23 NOTE — DISCHARGE INSTRUCTIONS
Thank you for allowing us to care for you today. Please follow-up with your Primary Care provider in the next 2-3 days if your symptoms do not improve. Plan for home:     A urine culture was sent on your urine. It is unclear if you have a true infection or just contamination of your urine. If the culture returns and is positive for infection in your urine, we will contact you. If your symptoms doesnt improve please follow-up with your primary care provider. A GC/Chlamydia culture has been sent. If anything grows on the culture then we will call you. You have been treated with Rocephin and azithromycin to treat for an infection while the cultures are pending. Come back to the ER if you have worsening symptoms, fevers over 100.9, shaking chills, nausea or vomiting. Patient Education        Exposure to Sexually Transmitted Infections: Care Instructions  Your Care Instructions  Sexually transmitted infections (STIs) are those diseases spread by sexual contact. There are at least 20 different STIs, including chlamydia, gonorrhea, syphilis, and human immunodeficiency virus (HIV), which causes AIDS. Bacteria-caused STIs can be treated and cured. STIs caused by viruses, such as HIV, can be treated but not cured. Some STIs can reduce a woman's chances of getting pregnant in the future. STIs are spread during sexual contact, such as vaginal intercourse and oral or anal sex. Follow-up care is a key part of your treatment and safety. Be sure to make and go to all appointments, and call your doctor if you are having problems. It's also a good idea to know your test results and keep a list of the medicines you take. How can you care for yourself at home? · Your doctor may have given you a shot of antibiotics. If your doctor prescribed antibiotic pills, take them as directed. Do not stop taking them just because you feel better. You need to take the full course of antibiotics.   · Do not have sexual contact while you have symptoms of an STI or are being treated for an STI. · Tell your sex partner (or partners) that he or she will need treatment. · If you are a woman, do not douche. Douching changes the normal balance of bacteria in the vagina and may spread an infection up into your reproductive organs. To prevent exposure to STIs in the future  · Use latex condoms every time you have sex. Use them from the beginning to the end of sexual contact. · Talk to your partner before you have sex. Find out if he or she has or is at risk for any STI. Keep in mind that a person may be able to spread an STI even if he or she does not have symptoms. · Do not have sex if you are being treated for an STI. · Do not have sex with anyone who has symptoms of an STI, such as sores on the genitals or mouth. · Having one sex partner (who does not have STIs and does not have sex with anyone else) is a good way to avoid STIs. When should you call for help? Call your doctor now or seek immediate medical care if:    · You have new pain in your belly or pelvis.     · You have symptoms of a urinary tract infection. These may include:  ? Pain or burning when you urinate. ? A frequent need to urinate without being able to pass much urine. ? Pain in the flank, which is just below the rib cage and above the waist on either side of the back. ? Blood in your urine. ? A fever.     · You have new or worsening pain or swelling in the scrotum.    Watch closely for changes in your health, and be sure to contact your doctor if:    · You have unusual vaginal bleeding.     · You have a discharge from the vagina or penis.     · You have any new symptoms, such as sores, bumps, rashes, blisters, or warts.     · You have itching, tingling, pain, or burning in the genital or anal area.     · You think you may have an STI. Where can you learn more? Go to http://kirstin-arsenio.info/.   Enter X077 in the search box to learn more about \"Exposure to Sexually Transmitted Infections: Care Instructions. \"  Current as of: September 11, 2018  Content Version: 11.9  © 9314-2436 [employer]. Care instructions adapted under license by [employer] (which disclaims liability or warranty for this information). If you have questions about a medical condition or this instruction, always ask your healthcare professional. [address] any warranty or liability for your use of this information.

## 2019-05-23 NOTE — ED NOTES
Discharge instructions signed by patient and RN and discussed with patient by provider. Discharged ambulatory and made aware that we may call with urine culture results upon receipt.

## 2019-05-23 NOTE — ED PROVIDER NOTES
Initial Complaint: Dysuria, vaginal DC & Low abd pain    Started: 1 month ago with UTI and chlamydia. Symptoms returned today. Vaginal Dc x 1 week. 2-3 days with burning with urination. Endorses: dysuria, vaginal DC, low abdominal pain  Denies: fevers, chills, nausea and vomiting. Made better: nothing  Made worse: urinating    No further complaints. Past Medical History:  2019: Chlamydia      Comment:  per pt  2012: DM (diabetes mellitus) ([facility] [address]      Comment:  Type II  No date: Essential hypertension      Comment:  hx of pre-e with previous pregnancy  11: HPV test positive      Comment:  Neg 16&18  No date: Hypercholesterolemia  No date: Migraine  2012: Mild depression ([facility] [address]  13;09/28/15: Pap smear for cervical cancer screening      Comment:  Normal Pap, Neg GC  2012: Postpartum depression  No date: Vitamin D deficiency  Past Surgical History:  :  DELIVERY ONLY      Comment:  c/s for LGA   ;: HX  SECTION      Comment:  x2  Reviewed      Primary care provider: Randa Cintron MD    The history is provided by the patient. No  was used. 34y F here with dysuria. Also with vaginal discharge and lower abd pain. No fever. Hx of similar about 3 weeks ago - got abx and sx's improved. Also with pain that radiates into the back. LMP over a month ago. Stopped her birth control pills when she had her UTI and just started back up 2 weeks ago.     Past Medical History:   Diagnosis Date    Chlamydia 2019    per pt    DM (diabetes mellitus) ([facility] [address] 2012    Type II    Essential hypertension     hx of pre-e with previous pregnancy    HPV test positive 11    Neg 16&18    Hypercholesterolemia     Migraine     Mild depression ([address] 2012    Pap smear for cervical cancer screening 13;09/28/15    Normal Pap, Neg GC    Postpartum depression 2012    Vitamin D deficiency      Past Surgical History:   Procedure Laterality Date     DELIVERY ONLY      c/s for LGA     HX  SECTION  ;2016    x2     Family History:   Problem Relation Age of Onset    Diabetes Mother         Type II    Hypertension Mother     Diabetes Sister 25        Type II    Hypertension Sister      Social History     Socioeconomic History    Marital status: SINGLE     Spouse name: Not on file    Number of children: Not on file    Years of education: Not on file    Highest education level: Not on file   Occupational History    Not on file   Social Needs    Financial resource strain: Not on file    Food insecurity:     Worry: Not on file     Inability: Not on file    Transportation needs:     Medical: Not on file     Non-medical: Not on file   Tobacco Use    Smoking status: Never Smoker    Smokeless tobacco: Never Used   Substance and Sexual Activity    Alcohol use: No    Drug use: No    Sexual activity: Yes     Partners: Male     Birth control/protection: Pill   Lifestyle    Physical activity:     Days per week: Not on file     Minutes per session: Not on file    Stress: Not on file   Relationships    Social connections:     Talks on phone: Not on file     Gets together: Not on file     Attends Judaism service: Not on file     Active member of club or organization: Not on file     Attends meetings of clubs or organizations: Not on file     Relationship status: Not on file    Intimate partner violence:     Fear of current or ex partner: Not on file     Emotionally abused: Not on file     Physically abused: Not on file     Forced sexual activity: Not on file   Other Topics Concern    Not on file   Social History Narrative    Not on file     ALLERGIES: Patient has no known allergies. Review of Systems   Constitutional: Negative for chills and fever. Gastrointestinal: Negative for nausea and vomiting. Genitourinary: Positive for dyspareunia, dysuria, frequency, pelvic pain and vaginal discharge. Musculoskeletal: Negative. All other systems reviewed and are negative. Vitals:    05/23/19 1053   BP: 110/59   Pulse: 75   Resp: 16   Temp: 99.3 °F (37.4 °C)   SpO2: 100%   Weight: 63.5 kg (140 lb)   Height: 5' 2\" (1.575 m)            Physical Exam   Constitutional: She is oriented to person, place, and time. She appears well-developed and well-nourished. HENT:   Head: Normocephalic and atraumatic. Neck: Normal range of motion. Neck supple. Cardiovascular: Normal rate, regular rhythm, normal heart sounds and intact distal pulses. Pulmonary/Chest: Effort normal and breath sounds normal. No respiratory distress. She has no wheezes. She has no rales. She exhibits no tenderness. Abdominal: Soft. Bowel sounds are normal. There is no tenderness. There is no guarding. Genitourinary: Uterus normal. Pelvic exam was performed with patient supine. There is no rash, tenderness, lesion or injury on the right labia. There is no rash, tenderness, lesion or injury on the left labia. Cervix exhibits motion tenderness. Right adnexum displays tenderness. Right adnexum displays no mass and no fullness. Left adnexum displays tenderness. Left adnexum displays no mass and no fullness. Vaginal discharge found. Musculoskeletal: Normal range of motion. Neurological: She is alert and oriented to person, place, and time. Skin: Skin is warm and dry. No erythema. Psychiatric: She has a normal mood and affect. Her behavior is normal. Judgment and thought content normal.   Nursing note and vitals reviewed. [facility]     Pelvic Exam  Date/Time: 5/23/2019 12:04 PM  Procedure duration:  3 minutes. Type of exam performed: bimanual and speculum. External genitalia appearance: normal.    Vaginal exam:  discharge. The amount of discharge was:  mild. The discharge was white and thin. Cervical exam:  minimal cervical motion tenderness. Specimen(s) collected:  chlamydia, GC and vaginal culture.   Bimanual exam:  right adenexal tenderness and left adenexal tenderness. Patient tolerance: Patient tolerated the procedure well with no immediate complications      Assessment & Plan:     Orders Placed This Encounter    [address], OTHER SOURCES    CHLAMYDIA/GC AMPLIFIED PROBE [[id number]]    WET PREP    US PELVIC NON OBSTETRIC    US TRANSVAGINAL    URINALYSIS W/MICROSCOPIC    POC URINE PREGNANCY TEST    PELVIC EXAM SET UP    HCG URINE, QL. - POC       Seen in triage by & discussed with Coy Doss, *,ED Provider    Berry Connolly NP  05/23/19  11:57 AM    Recently positive for chlamydia. Will treat for GC/Chalm. IM Rocephin & Azithro 1gm PO. Send urine for culture. Will call if culture is positive. Berry Connolly NP  05/23/19  12:16 PM    LABORATORY TESTS:  Labs Reviewed   URINALYSIS W/MICROSCOPIC - Abnormal; Notable for the following components:       Result Value    Appearance CLOUDY (*)     Protein 100 (*)     Glucose 500 (*)     Blood LARGE (*)     Leukocyte Esterase LARGE (*)     WBC >100 (*)     RBC >100 (*)     All other components within normal limits   VERITO, OTHER SOURCES   CHLAMYDIA/GC PCR   WET PREP   CULTURE, URINE   HCG URINE, QL. - POC       IMAGING RESULTS:  Us Transvaginal    Result Date: 5/23/2019  EXAM:  US PELV NON OBS, US TRANSVAGINAL INDICATION:  lower abd pain/pelvic pain and back pain COMPARISON: CT 2/22/2019. Sharonda Angela TECHNIQUE:  Real-time sonography of the pelvis was performed transvaginally and transabdominally using the urine filled bladder as an acoustic window. Multiple static images of the uterus and ovaries were obtained. FINDINGS: Transabdominally, the uterus is suboptimally visualized as a bladder is incompletely distended. The ovaries are not seen due to overlying bowel gas/undistended bladder. There is no mass or fluid or other abnormality in the adnexa or cul-de-sac. Transvaginally, the uterus is normal in size and echotexture and measures 4.4 x 6.7 x 8.8 cm.  The endometrial stripe measures 9. 2 mm. The ovaries appear normal with normal color-flow bilaterally. The right ovary measures 1.6 x 2.7 x 2.9 cm and the left ovary measures 1.2 x 3.1 x 3.5 cm. There is no mass or fluid or other abnormality in the adnexa or cul-de-sac. The cervix appears normal measuring 3.7 cm. Normal pelvic ultrasound examination. Us Pelv Non Obs    Result Date: 5/23/2019  EXAM:  US PELV NON OBS, US TRANSVAGINAL INDICATION:  lower abd pain/pelvic pain and back pain COMPARISON: CT 2/22/2019. [place] TECHNIQUE:  Real-time sonography of the pelvis was performed transvaginally and transabdominally using the urine filled bladder as an acoustic window. Multiple static images of the uterus and ovaries were obtained. FINDINGS: Transabdominally, the uterus is suboptimally visualized as a bladder is incompletely distended. The ovaries are not seen due to overlying bowel gas/undistended bladder. There is no mass or fluid or other abnormality in the adnexa or cul-de-sac. Transvaginally, the uterus is normal in size and echotexture and measures 4.4 x 6.7 x 8.8 cm. The endometrial stripe measures 9.2 mm. The ovaries appear normal with normal color-flow bilaterally. The right ovary measures 1.6 x 2.7 x 2.9 cm and the left ovary measures 1.2 x 3.1 x 3.5 cm. There is no mass or fluid or other abnormality in the adnexa or cul-de-sac. The cervix appears normal measuring 3.7 cm. Normal pelvic ultrasound examination. MEDICATIONS GIVEN:  Medications   cefTRIAXone (ROCEPHIN) 250 mg in lidocaine (PF) (XYLOCAINE) 10 mg/mL (1 %) IM injection (250 mg IntraMUSCular Given 5/23/19 1242)   azithromycin (ZITHROMAX) tablet 1,000 mg (1,000 mg Oral Given 5/23/19 1242)       IMPRESSION:  1. STI (sexually transmitted infection)        PLAN:  1. Current Discharge Medication List      CONTINUE these medications which have NOT CHANGED    Details   ! ! APRI 0.15-0.03 mg tab TAKE 1 TABLET BY MOUTH EVERY DAY  Qty: 4 Dose Pack, Refills: 0      naproxen sodium (ANAPROX) 550 mg tablet Take 1 Tab by mouth two (2) times daily (with meals). Qty: 30 Tab, Refills: 1      omeprazole (PRILOSEC) 40 mg capsule TAKE 1 CAPSULE BY MOUTH EVERY DAY  Refills: 0      ibuprofen (ADVIL) 200 mg tablet Take  by mouth. insulin glargine (LANTUS SOLOSTAR U-100 INSULIN) 100 unit/mL (3 mL) inpn 22 Units by SubCUTAneous route nightly. Qty: 15 mL, Refills: 11      !! APRI 0.15-0.03 mg tab TAKE 1 TABLET BY MOUTH EVERY DAY  Qty: 3 Dose Pack, Refills: 1    Comments: PATIENT IN NEED OF REFILLS      NOVOLOG FLEXPEN U-100 INSULIN 100 unit/mL inpn **NOT COVERED/PA NEEDED**INJECT 30 TO 35 UNITS THREE TIMES A DAY, BEFORE MEALS  Qty: 3 Pen, Refills: 11      BD INSULIN SYRINGE ULTRA-FINE 0.3 mL 31 gauge x 5/16 syrg USE 3 TIMES A DAY  Qty: 100 Syringe, Refills: 11      glucose blood VI test strips (ONETOUCH ULTRA TEST) strip Test blood glucose 4 daily Dx Code: E11.65  Qty: 200 Strip, Refills: 11    Associated Diagnoses: Type II diabetes mellitus, uncontrolled (HCC)      Blood-Glucose Meter (ONETOUCH ULTRAMINI) monitoring kit Test blood glucose 4 times daily Dx Code: E11.65  Qty: 1 Kit, Refills: 0    Associated Diagnoses: Type II diabetes mellitus, uncontrolled (HCC)      Lancets misc Test blood glucose 4 times daily Dx Code: E11.65  Qty: 200 Each, Refills: 11    Associated Diagnoses: Type II diabetes mellitus, uncontrolled ([facility] [address]       ! ! - Potential duplicate medications found. Please discuss with provider. 2.   Follow-up Information     Follow up With Specialties Details Why Contact Info    Randa Cintron MD Internal Medicine Schedule an appointment as soon as possible for a visit  [address] [place] [address] [phone]      [facility] EMERGENCY DEPT Emergency Medicine  As needed, If symptoms worsen [address] [facility]  [phone]        3.      Return to ED for new or worsening symptoms       Dwayne Horton NP       Please note that this dictation was completed with Dragon, the computer voice recognition software. Quite often unanticipated grammatical, syntax, homophones, and other interpretive errors are inadvertently transcribed by the computer software. Please disregard these errors. Please excuse any errors that have escaped final proofreading.

## 2019-05-24 LAB
BACTERIA SPEC CULT: ABNORMAL
C TRACH DNA SPEC QL NAA+PROBE: NEGATIVE
CC UR VC: ABNORMAL
N GONORRHOEA DNA SPEC QL NAA+PROBE: NEGATIVE
SAMPLE TYPE: NORMAL
SERVICE CMNT-IMP: ABNORMAL
SERVICE CMNT-IMP: NORMAL
SPECIMEN SOURCE: NORMAL

## 2019-06-04 RX ORDER — NAPROXEN SODIUM 550 MG/1
TABLET ORAL
Qty: 30 TAB | Refills: 1 | Status: SHIPPED | OUTPATIENT
Start: 2019-06-04 | End: 2020-12-02

## 2019-12-20 ENCOUNTER — OFFICE VISIT (OUTPATIENT)
Dept: OBGYN CLINIC | Age: 29
End: 2019-12-20

## 2019-12-20 VITALS
DIASTOLIC BLOOD PRESSURE: 64 MMHG | HEIGHT: 62 IN | BODY MASS INDEX: 25.76 KG/M2 | SYSTOLIC BLOOD PRESSURE: 108 MMHG | WEIGHT: 140 LBS

## 2019-12-20 DIAGNOSIS — Z20.2 POSSIBLE EXPOSURE TO STD: ICD-10-CM

## 2019-12-20 DIAGNOSIS — N89.8 VAGINAL DISCHARGE: Primary | ICD-10-CM

## 2019-12-20 RX ORDER — METRONIDAZOLE 7.5 MG/G
1 GEL VAGINAL
Qty: 2 TUBE | Refills: 2 | Status: SHIPPED | OUTPATIENT
Start: 2019-12-20 | End: 2019-12-25

## 2019-12-25 LAB
A VAGINAE DNA VAG QL NAA+PROBE: NORMAL SCORE
BVAB2 DNA VAG QL NAA+PROBE: NORMAL SCORE
C ALBICANS DNA VAG QL NAA+PROBE: NEGATIVE
C GLABRATA DNA VAG QL NAA+PROBE: NEGATIVE
C TRACH RRNA SPEC QL NAA+PROBE: NEGATIVE
MEGA1 DNA VAG QL NAA+PROBE: NORMAL SCORE
N GONORRHOEA RRNA SPEC QL NAA+PROBE: NEGATIVE
T VAGINALIS RRNA SPEC QL NAA+PROBE: NEGATIVE

## 2020-02-12 ENCOUNTER — APPOINTMENT (OUTPATIENT)
Dept: GENERAL RADIOLOGY | Age: 30
End: 2020-02-12
Attending: NURSE PRACTITIONER
Payer: MEDICAID

## 2020-02-12 ENCOUNTER — HOSPITAL ENCOUNTER (EMERGENCY)
Age: 30
Discharge: HOME | End: 2020-02-12
Attending: EMERGENCY MEDICINE
Payer: MEDICAID

## 2020-02-12 VITALS
WEIGHT: 140 LBS | RESPIRATION RATE: 20 BRPM | DIASTOLIC BLOOD PRESSURE: 68 MMHG | HEIGHT: 62 IN | HEART RATE: 89 BPM | SYSTOLIC BLOOD PRESSURE: 116 MMHG | OXYGEN SATURATION: 99 % | BODY MASS INDEX: 25.76 KG/M2 | TEMPERATURE: 98.1 F
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
## 2020-02-12 DIAGNOSIS — R07.81 RIB PAIN ON RIGHT SIDE: ICD-10-CM

## 2020-02-12 DIAGNOSIS — M25.511 ACUTE PAIN OF RIGHT SHOULDER: ICD-10-CM

## 2020-02-12 PROCEDURE — 99284 EMERGENCY DEPT VISIT MOD MDM: CPT

## 2020-02-12 PROCEDURE — 71101 X-RAY EXAM UNILAT RIBS/CHEST: CPT

## 2020-02-12 PROCEDURE — 73030 X-RAY EXAM OF SHOULDER: CPT

## 2020-02-12 PROCEDURE — 75810000275 HC EMERGENCY DEPT VISIT NO LEVEL OF CARE

## 2020-02-12 RX ORDER — ACETAMINOPHEN 500 MG
1000 TABLET ORAL
Qty: 30 TAB | Refills: 0 | Status: SHIPPED | OUTPATIENT
Start: 2020-02-12 | End: 2021-11-11 | Stop reason: ALTCHOICE

## 2020-02-12 RX ORDER — LIDOCAINE 50 MG/G
PATCH TOPICAL
Qty: 30 EACH | Refills: 0 | Status: SHIPPED | OUTPATIENT
Start: 2020-02-12 | End: 2021-11-10 | Stop reason: ALTCHOICE

## 2020-02-12 RX ORDER — METHOCARBAMOL 750 MG/1
750 TABLET, FILM COATED ORAL
Qty: 20 TAB | Refills: 0 | Status: SHIPPED | OUTPATIENT
Start: 2020-02-12 | End: 2020-12-02

## 2020-02-12 RX ORDER — IBUPROFEN 600 MG/1
600 TABLET ORAL
Qty: 30 TAB | Refills: 0 | Status: SHIPPED | OUTPATIENT
Start: 2020-02-12 | End: 2020-12-02
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
## 2020-02-12 NOTE — ED NOTES
1253. Patient verbalizes understanding of discharge instructions given by provider, Ruba Ojeda NP. Opportunity for questions provided. Patient in no apparent distress. Patient ambulatory upon discharge.    Visit Vitals  /68 (BP 1 Location: Left arm, BP Patient Position: Sitting)   Pulse 89   Temp 98.1 °F (36.7 °C)   Resp 20   Ht 5' 2\" (1.575 m)   Wt 63.5 kg (140 lb)   SpO2 99%   BMI 25.61 kg/m²
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
## 2020-02-12 NOTE — DISCHARGE INSTRUCTIONS
Thank you for allowing us to care for you today. Please follow-up with your Primary Care provider in the next 2-3 days if your symptoms do not improve. Plan for home:     Robaxin up to 4 times daily as needed for muscle spasm. No driving while on this medication. Ibuprofen every 6-8 hours for 7-10 days. Use on a schedule for the  next 3 days at a minimum, even if not having pain. Ibuprofen is an anti-inflammatory medication called an NSAID. It will help to treat the source of your pain. It is best to use this medication on a schedule. You need to keep the blood levels of the medication up for effective treatment. Narcotic pain medications only mask the pain and do not treat the source of your pain. Lidoderm 5% patch: apply & leave in place for 12 hours. Then remove and leave off for 12 hours. If the 5% patch is not covered by your insurance try the 4% patch that is available Over-the-counter. It is made by [employer] or [employer]. [employer] and [employer] are the least expensive. [employer] was the most expensive. You should use ice or heat for your injury and pain. You may use one or the other or alternate between the two. ICE ONLY FOR FIRST 50 HOURS after your injury! If it has been longer than 48 hours you may start with either. If you use ice: apply the ice pack in 20 minute intervals. 20 minutes on then 20 minutes off. Make sure to protect the skin to prevent frost bite. Never apply ice or a plastic bag with ice directly to the skin. If you use heat: Do NOT lay or sleep on a heating pad. You will burn your skin. Instead, use microwave style heat packs or Thermacare packs. These are safer than traditional heating pads. Monitor your skin to prevent burns. Patient Education        Musculoskeletal Chest Pain: Care Instructions  Your Care Instructions    Chest pain is not always a sign that something is wrong with your heart or that you have another serious problem.  The doctor thinks your chest pain is caused by strained muscles or ligaments, inflamed chest cartilage, or another problem in your chest, rather than by your heart. You may need more tests to find the cause of your chest pain. Follow-up care is a key part of your treatment and safety. Be sure to make and go to all appointments, and call your doctor if you are having problems. It's also a good idea to know your test results and keep a list of the medicines you take. How can you care for yourself at home? · Take pain medicines exactly as directed. ? If the doctor gave you a prescription medicine for pain, take it as prescribed. ? If you are not taking a prescription pain medicine, ask your doctor if you can take an over-the-counter medicine. · Rest and protect the sore area. · Stop, change, or take a break from any activity that may be causing your pain or soreness. · Put ice or a cold pack on the sore area for 10 to 20 minutes at a time. Try to do this every 1 to 2 hours for the next 3 days (when you are awake) or until the swelling goes down. Put a thin cloth between the ice and your skin. · After 2 or 3 days, apply a heating pad set on low or a warm cloth to the area that hurts. Some doctors suggest that you go back and forth between hot and cold. · Do not wrap or tape your ribs for support. This may cause you to take smaller breaths, which could increase your risk of lung problems. · Mentholated creams such as Bengay or Icy Hot may soothe sore muscles. Follow the instructions on the package. · Follow your doctor's instructions for exercising. · Gentle stretching and massage may help you get better faster. Stretch slowly to the point just before pain begins, and hold the stretch for at least 15 to 30 seconds. Do this 3 or 4 times a day. Stretch just after you have applied heat. · As your pain gets better, slowly return to your normal activities. Any increased pain may be a sign that you need to rest a while longer.   When should you call for help? Call 911 anytime you think you may need emergency care. For example, call if:    · You have chest pain or pressure. This may occur with:  ? Sweating. ? Shortness of breath. ? Nausea or vomiting. ? Pain that spreads from the chest to the neck, jaw, or one or both shoulders or arms. ? Dizziness or lightheadedness. ? A fast or uneven pulse. After calling 911, chew 1 adult-strength aspirin. Wait for an ambulance. Do not try to drive yourself.     · You have sudden chest pain and shortness of breath, or you cough up blood.    Call your doctor now or seek immediate medical care if:    · You have any trouble breathing.     · Your chest pain gets worse.     · Your chest pain occurs consistently with exercise and is relieved by rest.    Watch closely for changes in your health, and be sure to contact your doctor if:    · Your chest pain does not get better after 1 week. Where can you learn more? Go to http://kirstin-arsenio.info/. Enter V293 in the search box to learn more about \"Musculoskeletal Chest Pain: Care Instructions. \"  Current as of: June 26, 2019  Content Version: 12.2  © 6721-3149 [employer]. Care instructions adapted under license by [employer] (which disclaims liability or warranty for this information). If you have questions about a medical condition or this instruction, always ask your healthcare professional. [place] [address] any warranty or liability for your use of this information.

## 2020-02-13 ENCOUNTER — HOSPITAL ENCOUNTER (EMERGENCY)
Age: 30
Discharge: HOME | End: 2020-02-13
Attending: EMERGENCY MEDICINE | Admitting: EMERGENCY MEDICINE
Payer: COMMERCIAL

## 2020-02-13 ENCOUNTER — APPOINTMENT (OUTPATIENT)
Dept: GENERAL RADIOLOGY | Age: 30
End: 2020-02-13
Attending: EMERGENCY MEDICINE
Payer: COMMERCIAL

## 2020-02-13 VITALS
SYSTOLIC BLOOD PRESSURE: 112 MMHG | TEMPERATURE: 98.3 F | DIASTOLIC BLOOD PRESSURE: 75 MMHG | RESPIRATION RATE: 18 BRPM | HEART RATE: 92 BPM | OXYGEN SATURATION: 97 % | HEIGHT: 62 IN | WEIGHT: 140 LBS | BODY MASS INDEX: 25.76 KG/M2

## 2020-02-13 DIAGNOSIS — S20.229A CONTUSION OF BACK, UNSPECIFIED LATERALITY, INITIAL ENCOUNTER: ICD-10-CM
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
## 2020-02-13 PROCEDURE — 99284 EMERGENCY DEPT VISIT MOD MDM: CPT

## 2020-02-13 PROCEDURE — 72072 X-RAY EXAM THORAC SPINE 3VWS: CPT

## 2020-02-13 PROCEDURE — 75810000275 HC EMERGENCY DEPT VISIT NO LEVEL OF CARE

## 2020-02-14 NOTE — DISCHARGE INSTRUCTIONS
Patient Education        Bruised Rib: Care Instructions  Overview    You can get a bruised rib if you fall or get hit, such as in an accident or while playing sports. The medical term for a bruise is \"contusion. \" Small blood vessels get torn and leak blood under the skin. Most people think of a bruise as a black-and-blue area. But bones and muscles can also get bruised. An injury may damage the rib but not cause a bruise that you can see. Sometimes it can be hard to tell if a rib is bruised or broken. The symptoms may be the same. And a broken bone can't always be seen on an X-ray. But the treatment for a bruised rib is often the same as treatment for a broken one. An injury to the ribs can cause pain. The pain may be worse when you breathe deeply, cough, or sneeze. In most cases, a bruised rib will heal on its own. You can take pain medicine while the rib mends. Pain relief allows you to take deep breaths. Follow-up care is a key part of your treatment and safety. Be sure to make and go to all appointments, and call your doctor if you are having problems. It's also a good idea to know your test results and keep a list of the medicines you take. How can you care for yourself at home? · Rest and protect the injured or sore area. Stop, change, or take a break from any activity that causes pain. · Put ice or a cold pack on the area for 10 to 20 minutes at a time. Put a thin cloth between the ice and your skin. · After 2 or 3 days, if your swelling is gone, put a heating pad set on low or a warm cloth on your chest. Some doctors suggest that you go back and forth between hot and cold. Put a thin cloth between the heating pad and your skin. · Ask your doctor if you can take an over-the-counter pain medicine, such as acetaminophen (Tylenol), ibuprofen (Advil, Motrin), or naproxen (Aleve). Be safe with medicines. Read and follow all instructions on the label.   · As your pain gets better, slowly return to your normal activities. Be patient. Rib bruises can take weeks or months to heal. If the pain gets worse, it may be a sign that you need to rest a while longer. When should you call for help? Call 911 anytime you think you may need emergency care. For example, call if:    · You have severe trouble breathing.     Call your doctor now or seek immediate medical care if:    · You have trouble breathing.     · You have a fever.     · You have a new or worse cough.     · You have new or worse pain.    Watch closely for changes in your health, and be sure to contact your doctor if:    · You do not get better as expected. Where can you learn more? Go to http://kirstin-arsenio.info/. Enter R125 in the search box to learn more about \"Bruised Rib: Care Instructions. \"  Current as of: June 26, 2019  Content Version: 12.2  © 3270-9502 [employer]orporated. Care instructions adapted under license by [employer] (which disclaims liability or warranty for this information). If you have questions about a medical condition or this instruction, always ask your healthcare professional. [address] any warranty or liability for your use of this information.

## 2020-02-14 NOTE — FORENSIC NURSE
KAROL Freeman saw patient. Consents and history obtained. Photographs obtained. Patient tolerated exam well. Patient states she has a protective order in place. Patient denies any safety concerns. Patient taken to X-ray. SBAR given to Carlos Thomas RN for continuation of care.
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
## 2020-03-03 NOTE — PROGRESS NOTES
Annual exam ages 21-44    Alexsander Desai is a ,  34 y.o. female   Patient's last menstrual period was 2020 (exact date). She presents for her annual checkup. She is having no significant problems. She was recently in the ER for cystitis. She stated she was unable to take the antibiotic. Had Rocephin and took one day of Keflex. No symptoms. She also ran out of her OCP last week and would like to continue on the OCP. With regard to the Gardasil vaccine, she has not received it yet. Menstrual status:    Her periods are normal in flow. She is using three to ten pads or tampons per day, usually regular with a 26-32 day interval with 3-7 day duration. She does not have dysmenorrhea. She reports no premenstrual symptoms. Contraception:    The current method of family planning is OCP. Sexual history:    She  reports being sexually active and has had partner(s) who are Male. She reports using the following method of birth control/protection: Pill. Medical conditions:    Since her last annual GYN exam about two years ago, she has not the following changes in her health history: none. Surgical history confirmed with patient. has a past surgical history that includes hx  section (;) and pr  delivery only (). Pap and Mammogram History:    Her most recent Pap smear was normal, obtained 2 year(s) ago.     The patient has never had a mammogram.    Breast Cancer History/Substance Abuse: negative    Past Medical History:   Diagnosis Date    Chlamydia 2019    per pt    DM (diabetes mellitus) ([facility] [address] 2012    Type II; IDDM    Essential hypertension     hx of pre-e with previous pregnancy    HPV test positive 11    Neg 16&18    Hypercholesterolemia     Migraine     Mild depression ([address] 2012    Pap smear for cervical cancer screening 13;09/28/15    Normal Pap, Neg GC    Postpartum depression 2012    Vitamin D deficiency Past Surgical History:   Procedure Laterality Date     DELIVERY ONLY      c/s for LGA     HX  SECTION  ;2016    x2       Current Outpatient Medications   Medication Sig Dispense Refill    ibuprofen (MOTRIN) 600 mg tablet Take 1 Tab by mouth every six (6) hours as needed for Pain. 30 Tab 0    methocarbamol (ROBAXIN-750) 750 mg tablet Take 1 Tab by mouth four (4) times daily as needed for Muscle Spasm(s). As needed for muscle spasm  Indications: muscle spasm 20 Tab 0    acetaminophen (TYLENOL) 500 mg tablet Take 2 Tabs by mouth every six (6) hours as needed for Pain. 30 Tab 0    BD INSULIN SYRINGE ULTRA-FINE 0.3 mL 31 gauge x 5/16\" syrg USE 3 TIMES A  Syringe 11    NOVOLOG FLEXPEN U-100 INSULIN 100 unit/mL (3 mL) inpn **NOT COVERED/PA NEEDED**INJECT 30 TO 35 UNITS THREE TIMES A DAY, BEFORE MEALS 30 Adjustable Dose Pre-filled Pen Syringe 39    naproxen sodium (ANAPROX) 550 mg tablet TAKE 1 TABLET BY MOUTH TWICE A DAY WITH MEALS 30 Tab 1    omeprazole (PRILOSEC) 40 mg capsule TAKE 1 CAPSULE BY MOUTH EVERY DAY  0    insulin glargine (LANTUS SOLOSTAR U-100 INSULIN) 100 unit/mL (3 mL) inpn 22 Units by SubCUTAneous route nightly. 15 mL 11    NOVOLOG FLEXPEN U-100 INSULIN 100 unit/mL inpn **NOT COVERED/PA NEEDED**INJECT 30 TO 35 UNITS THREE TIMES A DAY, BEFORE MEALS 3 Pen 11    glucose blood VI test strips (ONETOUCH ULTRA TEST) strip Test blood glucose 4 daily Dx Code: E11.65 200 Strip 11    Blood-Glucose Meter (ONETOUCH ULTRAMINI) monitoring kit Test blood glucose 4 times daily Dx Code: E11.65 1 Kit 0    Lancets misc Test blood glucose 4 times daily Dx Code: E11.65 200 Each 11    lidocaine (LIDODERM) 5 % Apply up to 3 patches to the affected area for 12 hours a day and remove for 12 hours a day. 30 Each 0    APRI 0.15-0.03 mg tab TAKE 1 TABLET BY MOUTH EVERY DAY 1 Dose Pack 1     Allergies: Patient has no known allergies.      Tobacco History:  reports that she has never smoked. She has never used smokeless tobacco.  Alcohol Abuse:  reports no history of alcohol use. Drug Abuse:  reports no history of drug use.     Family Medical/Cancer History:   Family History   Problem Relation Age of Onset    Diabetes Mother         Type II    Hypertension Mother     Diabetes Sister 25        Type II    Hypertension Sister         Review of Systems - History obtained from the patient  Constitutional: negative for weight loss, fever, night sweats  HEENT: negative for hearing loss, earache, congestion, snoring, sorethroat  CV: negative for chest pain, palpitations, edema  Resp: negative for cough, shortness of breath, wheezing  GI: negative for change in bowel habits, abdominal pain, black or bloody stools  : negative for frequency, dysuria, hematuria, vaginal discharge  MSK: negative for back pain, joint pain, muscle pain  Breast: negative for breast lumps, nipple discharge, galactorrhea  Skin :negative for itching, rash, hives  Neuro: negative for dizziness, headache, confusion, weakness  Psych: negative for anxiety, depression, change in mood  Heme/lymph: negative for bleeding, bruising, pallor    Physical Exam    Visit Vitals  /68   Ht 5' 2\" (1.575 m)   Wt 145 lb 9.3 oz (66 kg)   LMP 02/26/2020 (Exact Date)   BMI 26.63 kg/m²       Constitutional  · Appearance: well-nourished, well developed, alert, in no acute distress    HENT  · Head and Face: appears normal    Neck  · Inspection/Palpation: normal appearance, no masses or tenderness  · Lymph Nodes: no lymphadenopathy present  · Thyroid: gland size normal, nontender, no nodules or masses present on palpation    Chest  · Respiratory Effort: breathing unlabored  · Auscultation: normal breath sounds    Cardiovascular  · Heart:  · Auscultation: regular rate and rhythm without murmur    Breasts  · Inspection of Breasts: breasts symmetrical, no skin changes, no discharge present, nipple appearance normal, no skin retraction present  · Palpation of Breasts and Axillae: no masses present on palpation, no breast tenderness  · Axillary Lymph Nodes: no lymphadenopathy present    Gastrointestinal  · Abdominal Examination: abdomen non-tender to palpation, normal bowel sounds, no masses present  · Liver and spleen: no hepatomegaly present, spleen not palpable  · Hernias: no hernias identified    Genitourinary  · External Genitalia: normal appearance for age, no discharge present, no tenderness present, no inflammatory lesions present, no masses present, no atrophy present  · Vagina: normal vaginal vault without central or paravaginal defects, no discharge present, no inflammatory lesions present, no masses present  · Bladder: non-tender to palpation  · Urethra: appears normal  · Cervix: normal   · Uterus: normal size, shape and consistency  · Adnexa: no adnexal tenderness present, no adnexal masses present  · Perineum: perineum within normal limits, no evidence of trauma, no rashes or skin lesions present  · Anus: anus within normal limits, no hemorrhoids present  · Inguinal Lymph Nodes: no lymphadenopathy present    Skin  · General Inspection: no rash, no lesions identified    Neurologic/Psychiatric  · Mental Status:  · Orientation: grossly oriented to person, place and time  · Mood and Affect: mood normal, affect appropriate    . Assessment:  Routine gynecologic examination  Her current medical status is satisfactory with no evidence of significant gynecologic issues.     Plan:  Counseled re: diet, exercise, healthy lifestyle  Return for yearly wellness visits  Gardisil counseling provided  Pt counseled regarding co-testing for high risk HPV with pap  Rec screening mammo at either 35 or 40

## 2020-03-04 ENCOUNTER — OFFICE VISIT (OUTPATIENT)
Dept: OBGYN CLINIC | Age: 30
End: 2020-03-04

## 2020-03-04 VITALS
DIASTOLIC BLOOD PRESSURE: 68 MMHG | SYSTOLIC BLOOD PRESSURE: 112 MMHG | HEIGHT: 62 IN | WEIGHT: 145.58 LBS | BODY MASS INDEX: 26.79 KG/M2

## 2020-03-04 DIAGNOSIS — Z87.440 HISTORY OF UTI: ICD-10-CM

## 2020-03-04 DIAGNOSIS — Z01.419 ENCOUNTER FOR ROUTINE GYNECOLOGICAL EXAMINATION WITH PAPANICOLAOU SMEAR OF CERVIX: Primary | ICD-10-CM

## 2020-03-04 LAB
BILIRUB UR QL STRIP: NEGATIVE
GLUCOSE UR-MCNC: NEGATIVE MG/DL
KETONES P FAST UR STRIP-MCNC: NEGATIVE MG/DL
PH UR STRIP: 7 [PH] (ref 4.6–8)
PROT UR QL STRIP: NEGATIVE
SP GR UR STRIP: 1 (ref 1–1.03)
UA UROBILINOGEN AMB POC: NORMAL (ref 0.2–1)
URINALYSIS CLARITY POC: CLEAR
URINALYSIS COLOR POC: YELLOW
URINE BLOOD POC: NEGATIVE
URINE LEUKOCYTES POC: NEGATIVE
URINE NITRITES POC: NEGATIVE

## 2020-03-04 RX ORDER — DESOGESTREL AND ETHINYL ESTRADIOL 0.15-0.03
KIT ORAL
Qty: 3 DOSE PACK | Refills: 4 | Status: SHIPPED | OUTPATIENT
Start: 2020-03-04 | End: 2020-12-02

## 2020-03-04 NOTE — PATIENT INSTRUCTIONS
Well Visit, Ages 25 to 48: Care Instructions  Your Care Instructions    Physical exams can help you stay healthy. Your doctor has checked your overall health and may have suggested ways to take good care of yourself. He or she also may have recommended tests. At home, you can help prevent illness with healthy eating, regular exercise, and other steps. Follow-up care is a key part of your treatment and safety. Be sure to make and go to all appointments, and call your doctor if you are having problems. It's also a good idea to know your test results and keep a list of the medicines you take. How can you care for yourself at home? · Reach and stay at a healthy weight. This will lower your risk for many problems, such as obesity, diabetes, heart disease, and high blood pressure. · Get at least 30 minutes of physical activity on most days of the week. Walking is a good choice. You also may want to do other activities, such as running, swimming, cycling, or playing tennis or team sports. Discuss any changes in your exercise program with your doctor. · Do not smoke or allow others to smoke around you. If you need help quitting, talk to your doctor about stop-smoking programs and medicines. These can increase your chances of quitting for good. · Talk to your doctor about whether you have any risk factors for sexually transmitted infections (STIs). Having one sex partner (who does not have STIs and does not have sex with anyone else) is a good way to avoid these infections. · Use birth control if you do not want to have children at this time. Talk with your doctor about the choices available and what might be best for you. · Protect your skin from too much sun. When you're outdoors from 10 a.m. to 4 p.m., stay in the shade or cover up with clothing and a hat with a wide brim. Wear sunglasses that block UV rays. Even when it's cloudy, put broad-spectrum sunscreen (SPF 30 or higher) on any exposed skin.   · See a dentist one or two times a year for checkups and to have your teeth cleaned. · Wear a seat belt in the car. Follow your doctor's advice about when to have certain tests. These tests can spot problems early. For everyone  · Cholesterol. Have the fat (cholesterol) in your blood tested after age 21. Your doctor will tell you how often to have this done based on your age, family history, or other things that can increase your risk for heart disease. · Blood pressure. Have your blood pressure checked during a routine doctor visit. Your doctor will tell you how often to check your blood pressure based on your age, your blood pressure results, and other factors. · Vision. Talk with your doctor about how often to have a glaucoma test.  · Diabetes. Ask your doctor whether you should have tests for diabetes. · Colon cancer. Your risk for colorectal cancer gets higher as you get older. Some experts say that adults should start regular screening at age 48 and stop at age 76. Others say to start before age 48 or continue after age 76. Talk with your doctor about your risk and when to start and stop screening. For women  · Breast exam and mammogram. Talk to your doctor about when you should have a clinical breast exam and a mammogram. Medical experts differ on whether and how often women under 50 should have these tests. Your doctor can help you decide what is right for you. · Cervical cancer screening test and pelvic exam. Begin with a Pap test at age 24. The test often is part of a pelvic exam. Starting at age 27, you may choose to have a Pap test, an HPV test, or both tests at the same time (called co-testing). Talk with your doctor about how often to have testing. · Tests for sexually transmitted infections (STIs). Ask whether you should have tests for STIs. You may be at risk if you have sex with more than one person, especially if your partners do not wear condoms.   For men  · Tests for sexually transmitted infections (STIs). Ask whether you should have tests for STIs. You may be at risk if you have sex with more than one person, especially if you do not wear a condom. · Testicular cancer exam. Ask your doctor whether you should check your testicles regularly. · Prostate exam. Talk to your doctor about whether you should have a blood test (called a PSA test) for prostate cancer. Experts differ on whether and when men should have this test. Some experts suggest it if you are older than 39 and are -American or have a father or brother who got prostate cancer when he was younger than 72. When should you call for help? Watch closely for changes in your health, and be sure to contact your doctor if you have any problems or symptoms that concern you. Where can you learn more? Go to http://kirstin-arsenio.info/. Enter P072 in the search box to learn more about \"Well Visit, Ages 25 to 48: Care Instructions. \"  Current as of: December 13, 2018  Content Version: 12.2  © 7912-4090 [employer]. Care instructions adapted under license by [employer] (which disclaims liability or warranty for this information). If you have questions about a medical condition or this instruction, always ask your healthcare professional. [place] [address] any warranty or liability for your use of this information.

## 2020-03-09 LAB
BACTERIA UR CULT: ABNORMAL
BACTERIA UR CULT: ABNORMAL

## 2020-03-09 RX ORDER — NITROFURANTOIN 25; 75 MG/1; MG/1
100 CAPSULE ORAL 2 TIMES DAILY
Qty: 14 CAP | Refills: 0 | Status: SHIPPED | OUTPATIENT
Start: 2020-03-09 | End: 2020-03-16

## 2020-03-11 ENCOUNTER — HOSPITAL ENCOUNTER (EMERGENCY)
Age: 30
Discharge: HOME | End: 2020-03-11
Attending: EMERGENCY MEDICINE | Admitting: EMERGENCY MEDICINE
Payer: COMMERCIAL

## 2020-03-11 VITALS
HEART RATE: 93 BPM | DIASTOLIC BLOOD PRESSURE: 67 MMHG | SYSTOLIC BLOOD PRESSURE: 104 MMHG | TEMPERATURE: 99.4 F | RESPIRATION RATE: 18 BRPM | OXYGEN SATURATION: 98 %

## 2020-03-11 DIAGNOSIS — J10.1 INFLUENZA A: Primary | ICD-10-CM

## 2020-03-11 LAB
ALBUMIN SERPL-MCNC: 3.9 G/DL (ref 3.5–5)
ALBUMIN/GLOB SERPL: 1 {RATIO} (ref 1.1–2.2)
ALP SERPL-CCNC: 90 U/L (ref 45–117)
ALT SERPL-CCNC: 60 U/L (ref 12–78)
ANION GAP SERPL CALC-SCNC: 8 MMOL/L (ref 5–15)
APPEARANCE UR: ABNORMAL
AST SERPL-CCNC: 68 U/L (ref 15–37)
BACTERIA URNS QL MICRO: ABNORMAL /HPF
BASOPHILS # BLD: 0 K/UL (ref 0–0.1)
BASOPHILS NFR BLD: 1 % (ref 0–1)
BILIRUB SERPL-MCNC: 0.3 MG/DL (ref 0.2–1)
BILIRUB UR QL: NEGATIVE
BUN SERPL-MCNC: 14 MG/DL (ref 6–20)
BUN/CREAT SERPL: 19 (ref 12–20)
CALCIUM SERPL-MCNC: 8.9 MG/DL (ref 8.5–10.1)
CHLORIDE SERPL-SCNC: 100 MMOL/L (ref 97–108)
CO2 SERPL-SCNC: 27 MMOL/L (ref 21–32)
COLOR UR: ABNORMAL
COMMENT, HOLDF: NORMAL
CREAT SERPL-MCNC: 0.74 MG/DL (ref 0.55–1.02)
DIFFERENTIAL METHOD BLD: ABNORMAL
EOSINOPHIL # BLD: 0 K/UL (ref 0–0.4)
EOSINOPHIL NFR BLD: 1 % (ref 0–7)
EPITH CASTS URNS QL MICRO: ABNORMAL /LPF
ERYTHROCYTE [DISTWIDTH] IN BLOOD BY AUTOMATED COUNT: 11.9 % (ref 11.5–14.5)
FLUAV AG NPH QL IA: POSITIVE
FLUBV AG NOSE QL IA: NEGATIVE
GLOBULIN SER CALC-MCNC: 3.9 G/DL (ref 2–4)
GLUCOSE BLD STRIP.AUTO-MCNC: 213 MG/DL (ref 65–100)
GLUCOSE SERPL-MCNC: 237 MG/DL (ref 65–100)
GLUCOSE UR STRIP.AUTO-MCNC: 250 MG/DL
HCG UR QL: NEGATIVE
HCT VFR BLD AUTO: 37.7 % (ref 35–47)
HGB BLD-MCNC: 11.9 G/DL (ref 11.5–16)
HGB UR QL STRIP: NEGATIVE
IMM GRANULOCYTES # BLD AUTO: 0 K/UL (ref 0–0.04)
IMM GRANULOCYTES NFR BLD AUTO: 0 % (ref 0–0.5)
KETONES UR QL STRIP.AUTO: ABNORMAL MG/DL
LEUKOCYTE ESTERASE UR QL STRIP.AUTO: ABNORMAL
LYMPHOCYTES # BLD: 0.8 K/UL (ref 0.8–3.5)
LYMPHOCYTES NFR BLD: 13 % (ref 12–49)
MCH RBC QN AUTO: 27 PG (ref 26–34)
MCHC RBC AUTO-ENTMCNC: 31.6 G/DL (ref 30–36.5)
MCV RBC AUTO: 85.5 FL (ref 80–99)
MONOCYTES # BLD: 0.9 K/UL (ref 0–1)
MONOCYTES NFR BLD: 14 % (ref 5–13)
MUCOUS THREADS URNS QL MICRO: ABNORMAL /LPF
NEUTS SEG # BLD: 4.6 K/UL (ref 1.8–8)
NEUTS SEG NFR BLD: 71 % (ref 32–75)
NITRITE UR QL STRIP.AUTO: NEGATIVE
NRBC # BLD: 0 K/UL (ref 0–0.01)
NRBC BLD-RTO: 0 PER 100 WBC
PH UR STRIP: 8 [PH] (ref 5–8)
PLATELET # BLD AUTO: 402 K/UL (ref 150–400)
PMV BLD AUTO: 9.1 FL (ref 8.9–12.9)
POTASSIUM SERPL-SCNC: 4.4 MMOL/L (ref 3.5–5.1)
PROT SERPL-MCNC: 7.8 G/DL (ref 6.4–8.2)
PROT UR STRIP-MCNC: NEGATIVE MG/DL
RBC # BLD AUTO: 4.41 M/UL (ref 3.8–5.2)
RBC #/AREA URNS HPF: ABNORMAL /HPF (ref 0–5)
SAMPLES BEING HELD,HOLD: NORMAL
SERVICE CMNT-IMP: ABNORMAL
SODIUM SERPL-SCNC: 135 MMOL/L (ref 136–145)
SP GR UR REFRACTOMETRY: 1.02 (ref 1–1.03)
UA: UC IF INDICATED,UAUC: ABNORMAL
UROBILINOGEN UR QL STRIP.AUTO: 1 EU/DL (ref 0.2–1)
WBC # BLD AUTO: 6.5 K/UL (ref 3.6–11)
WBC URNS QL MICRO: ABNORMAL /HPF (ref 0–4)

## 2020-03-11 PROCEDURE — 74011250636 HC RX REV CODE- 250/636: Performed by: EMERGENCY MEDICINE

## 2020-03-11 PROCEDURE — 81025 URINE PREGNANCY TEST: CPT

## 2020-03-11 PROCEDURE — 74011250637 HC RX REV CODE- 250/637: Performed by: EMERGENCY MEDICINE

## 2020-03-11 PROCEDURE — 80053 COMPREHEN METABOLIC PANEL: CPT

## 2020-03-11 PROCEDURE — 99284 EMERGENCY DEPT VISIT MOD MDM: CPT

## 2020-03-11 PROCEDURE — 81001 URINALYSIS AUTO W/SCOPE: CPT

## 2020-03-11 PROCEDURE — 87804 INFLUENZA ASSAY W/OPTIC: CPT

## 2020-03-11 PROCEDURE — 85025 COMPLETE CBC W/AUTO DIFF WBC: CPT

## 2020-03-11 PROCEDURE — 87147 CULTURE TYPE IMMUNOLOGIC: CPT

## 2020-03-11 PROCEDURE — 82962 GLUCOSE BLOOD TEST: CPT

## 2020-03-11 PROCEDURE — 36415 COLL VENOUS BLD VENIPUNCTURE: CPT

## 2020-03-11 PROCEDURE — 87086 URINE CULTURE/COLONY COUNT: CPT

## 2020-03-11 RX ORDER — OSELTAMIVIR PHOSPHATE 75 MG/1
75 CAPSULE ORAL 2 TIMES DAILY
Qty: 10 CAP | Refills: 0 | Status: SHIPPED | OUTPATIENT
Start: 2020-03-11 | End: 2020-03-16

## 2020-03-11 RX ORDER — ACETAMINOPHEN 325 MG/1
650 TABLET ORAL
Status: COMPLETED | OUTPATIENT
Start: 2020-03-11 | End: 2020-03-11

## 2020-03-11 RX ORDER — ONDANSETRON 4 MG/1
4 TABLET, ORALLY DISINTEGRATING ORAL
Qty: 10 TAB | Refills: 0 | Status: SHIPPED | OUTPATIENT
Start: 2020-03-11 | End: 2020-12-02

## 2020-03-11 RX ORDER — SODIUM CHLORIDE 9 MG/ML
1000 INJECTION, SOLUTION INTRAVENOUS ONCE
Status: COMPLETED | OUTPATIENT
Start: 2020-03-11 | End: 2020-03-11

## 2020-03-11 RX ADMIN — SODIUM CHLORIDE 1000 ML: 900 INJECTION, SOLUTION INTRAVENOUS at 10:24

## 2020-03-11 RX ADMIN — SODIUM CHLORIDE 1000 ML: 900 INJECTION, SOLUTION INTRAVENOUS at 12:04

## 2020-03-11 RX ADMIN — ACETAMINOPHEN 650 MG: 325 TABLET ORAL at 12:24

## 2020-03-11 NOTE — LETTER
[address] 
[facility] EMERGENCY DEPT 
[address] [place] [address] [phone] Work/School Note Date: 3/11/2020 To Whom It May concern: 
 
Melissa Crandall was seen and treated today in the emergency room by the following provider(s): 
Attending Provider: Cecile Salmon MD. Melissa Crandall may return to work on 3/16/2020. Sincerely, 
 
 
 
 
Kathe Kuo

## 2020-03-11 NOTE — ED PROVIDER NOTES
Please note that this dictation was completed with [employer]ce, the computer voice recognition software.  Quite often unanticipated grammatical, syntax, homophones, and other interpretive errors are inadvertently transcribed by the computer software.  Please disregard these errors.  Please excuse any errors that have escaped final proofreading. 71-year-old American female past medical history chlamydia, diabetes type 2, essential hypertension with previous pregnancy, high cholesterol, migraines, vitamin D deficiency presents complaining of \"a bad generalized body aches and not feeling well since last night. Associated with nausea and decreased p.o. intake. Patient states she did have a normal bowel movement last took ibuprofen at 1 AM this morning but has not taken anything since. Patient states she is being treated for UTI with Macrobid by her PCP, and the symptoms are improving. She denies overt fevers but states she has had chills. Patient also endorses a bit of a headache.     Pt drove herself and her child to ER; denies getting flu shot this year    pt denies HA, vison changes, diff swallowing, CP, SOB, Abd pain, Fevers, N/V, D/Cons or other current systemic complaints    Social/ PSH reviewed in EMR    EMR Chart Reviewed           Past Medical History:   Diagnosis Date    Chlamydia 2019    per pt    DM (diabetes mellitus) ([facility] [address] 2012    Type II; IDDM    Essential hypertension     hx of pre-e with previous pregnancy    HPV test positive 11    Neg 16&18    Hypercholesterolemia     Migraine     Mild depression ([address] 2012    Pap smear for cervical cancer screening 13;09/28/15    Normal Pap, Neg GC    Postpartum depression 2012    Vitamin D deficiency        Past Surgical History:   Procedure Laterality Date     DELIVERY ONLY      c/s for LGA     HX  SECTION  ;2016    x2         Family History:   Problem Relation Age of Onset    Diabetes Mother Type II    Hypertension Mother     Diabetes Sister 25        Type II    Hypertension Sister        Social History     Socioeconomic History    Marital status: SINGLE     Spouse name: Not on file    Number of children: Not on file    Years of education: Not on file    Highest education level: Not on file   Occupational History    Not on file   Social Needs    Financial resource strain: Not on file    Food insecurity     Worry: Not on file     Inability: Not on file    Transportation needs     Medical: Not on file     Non-medical: Not on file   Tobacco Use    Smoking status: Never Smoker    Smokeless tobacco: Never Used   Substance and Sexual Activity    Alcohol use: No    Drug use: No    Sexual activity: Yes     Partners: Male     Birth control/protection: Pill   Lifestyle    Physical activity     Days per week: Not on file     Minutes per session: Not on file    Stress: Not on file   Relationships    Social connections     Talks on phone: Not on file     Gets together: Not on file     Attends Oriental orthodox service: Not on file     Active member of club or organization: Not on file     Attends meetings of clubs or organizations: Not on file     Relationship status: Not on file    Intimate partner violence     Fear of current or ex partner: Not on file     Emotionally abused: Not on file     Physically abused: Not on file     Forced sexual activity: Not on file   Other Topics Concern    Not on file   Social History Narrative    Not on file         ALLERGIES: Patient has no known allergies. Review of Systems   Constitutional: Positive for activity change, appetite change and chills. Negative for fever. HENT: Negative for drooling, trouble swallowing and voice change. Eyes: Negative for visual disturbance. Respiratory: Negative for cough, shortness of breath and stridor. Cardiovascular: Negative for chest pain, palpitations and leg swelling.    Gastrointestinal: Negative for abdominal pain, diarrhea, nausea and vomiting. Genitourinary: Negative for dysuria and hematuria. Musculoskeletal: Positive for arthralgias and myalgias. Skin: Negative for rash. Neurological: Negative for dizziness and speech difficulty. All other systems reviewed and are negative. Vitals:    03/11/20 1008   BP: (!) 74/50   Pulse: (!) 115   Resp: 14   Temp: 99.1 °F (37.3 °C)   SpO2: 98%            Physical Exam  Vitals signs and nursing note reviewed. Constitutional:       General: She is not in acute distress. Appearance: Normal appearance. She is well-developed. She is not ill-appearing, toxic-appearing or diaphoretic. Comments: Uncomfortable appearing, AxOx4, speaking in complete sentences  Looks like she has the flu;        HENT:      Head:      Comments: Cn intact    No meningeal signs       Right Ear: External ear normal.      Left Ear: External ear normal.      Nose: Nose normal.      Mouth/Throat:      Mouth: Mucous membranes are moist.      Pharynx: No oropharyngeal exudate or posterior oropharyngeal erythema. Eyes:      General: No scleral icterus. Right eye: No discharge. Extraocular Movements: Extraocular movements intact. Conjunctiva/sclera: Conjunctivae normal.      Pupils: Pupils are equal, round, and reactive to light. Neck:      Musculoskeletal: Normal range of motion and neck supple. No neck rigidity. Vascular: No JVD. Trachea: No tracheal deviation. Cardiovascular:      Rate and Rhythm: Normal rate and regular rhythm. Pulses: Normal pulses. Heart sounds: Normal heart sounds. No murmur. No friction rub. No gallop. Pulmonary:      Effort: Pulmonary effort is normal. No respiratory distress. Breath sounds: Normal breath sounds. No wheezing or rales. Chest:      Chest wall: No tenderness. Abdominal:      General: Bowel sounds are normal.      Palpations: Abdomen is soft. Tenderness: There is no abdominal tenderness.  There is no guarding or rebound. Comments: nttp     Genitourinary:     Vagina: No vaginal discharge. Comments: Pt denies urinary/ vaginal complaints  Musculoskeletal: Normal range of motion. General: No swelling, tenderness, deformity or signs of injury. Right lower leg: No edema. Left lower leg: No edema. Skin:     General: Skin is warm and dry. Capillary Refill: Capillary refill takes less than 2 seconds. Coloration: Skin is not jaundiced or pale. Findings: No bruising, erythema, lesion or rash. Neurological:      General: No focal deficit present. Mental Status: She is alert and oriented to person, place, and time. Cranial Nerves: No cranial nerve deficit. Motor: No abnormal muscle tone. Coordination: Coordination normal.      Comments: pt has motor/ CV/ Sensation grossly intact to all extremities, R = L in strength;   Psychiatric:         Behavior: Behavior normal.         Thought Content: Thought content normal.          MDM       Procedures    11:55 AM Pt told of results/ agrees w/ plans; already on abx for UTI; will give her an RX for tamiflu/ zofran;   Renetta Beach's  results have been reviewed with her. She has been counseled regarding her diagnosis. She verbally conveys understanding and agreement of the signs, symptoms, diagnosis, treatment and prognosis and additionally agrees to Call/ Arrange follow up as recommended with Dr. Caroline Lowery MD in 24 - 48 hours. She also agrees with the care-plan and conveys that all of her questions have been answered. I have also put together some discharge instructions for her that include: 1) educational information regarding their diagnosis, 2) how to care for their diagnosis at home, as well a 3) list of reasons why they would want to return to the ED prior to their follow-up appointment, should their condition change or for concerns.

## 2020-03-11 NOTE — DISCHARGE INSTRUCTIONS
Patient Education        Influenza (Flu): Care Instructions  Your Care Instructions    Influenza (flu) is an infection in the lungs and breathing passages. It is caused by the influenza virus. There are different strains, or types, of the flu virus from year to year. Unlike the common cold, the flu comes on suddenly and the symptoms, such as a cough, congestion, fever, chills, fatigue, aches, and pains, are more severe. These symptoms may last up to 10 days. Although the flu can make you feel very sick, it usually doesn't cause serious health problems. Home treatment is usually all you need for flu symptoms. But your doctor may prescribe antiviral medicine to prevent other health problems, such as pneumonia, from developing. Older people and those who have a long-term health condition, such as lung disease, are most at risk for having pneumonia or other health problems. Follow-up care is a key part of your treatment and safety. Be sure to make and go to all appointments, and call your doctor if you are having problems. It's also a good idea to know your test results and keep a list of the medicines you take. How can you care for yourself at home? · Get plenty of rest.  · Drink plenty of fluids, enough so that your urine is light yellow or clear like water. If you have kidney, heart, or liver disease and have to limit fluids, talk with your doctor before you increase the amount of fluids you drink. · Take an over-the-counter pain medicine if needed, such as acetaminophen (Tylenol), ibuprofen (Advil, Motrin), or naproxen (Aleve), to relieve fever, headache, and muscle aches. Read and follow all instructions on the label. No one younger than 20 should take aspirin. It has been linked to Reye syndrome, a serious illness. · Do not smoke. Smoking can make the flu worse. If you need help quitting, talk to your doctor about stop-smoking programs and medicines.  These can increase your chances of quitting for good.  · Breathe moist air from a hot shower or from a sink filled with hot water to help clear a stuffy nose. · Before you use cough and cold medicines, check the label. These medicines may not be safe for young children or for people with certain health problems. · If the skin around your nose and lips becomes sore, put some petroleum jelly on the area. · To ease coughing:  ? Drink fluids to soothe a scratchy throat. ? Suck on cough drops or plain hard candy. ? Take an over-the-counter cough medicine that contains dextromethorphan to help you get some sleep. Read and follow all instructions on the label. ? Raise your head at night with an extra pillow. This may help you rest if coughing keeps you awake. · Take any prescribed medicine exactly as directed. Call your doctor if you think you are having a problem with your medicine. To avoid spreading the flu  · Wash your hands regularly, and keep your hands away from your face. · Stay home from school, work, and other public places until you are feeling better and your fever has been gone for at least 24 hours. The fever needs to have gone away on its own without the help of medicine. · Ask people living with you to talk to their doctors about preventing the flu. They may get antiviral medicine to keep from getting the flu from you. · To prevent the flu in the future, get a flu vaccine every fall. Encourage people living with you to get the vaccine. · Cover your mouth when you cough or sneeze. When should you call for help? Call 911 anytime you think you may need emergency care.  For example, call if:    · You have severe trouble breathing.    Call your doctor now or seek immediate medical care if:    · You have new or worse trouble breathing.     · You seem to be getting much sicker.     · You feel very sleepy or confused.     · You have a new or higher fever.     · You get a new rash.    Watch closely for changes in your health, and be sure to contact your doctor if:    · You begin to get better and then get worse.     · You are not getting better after 1 week. Where can you learn more? Go to http://kirstin-arsenio.info/. Enter S941 in the search box to learn more about \"Influenza (Flu): Care Instructions. \"  Current as of: June 9, 2019  Content Version: 12.2  © 8533-2898 [employer]. Care instructions adapted under license by [employer] (which disclaims liability or warranty for this information). If you have questions about a medical condition or this instruction, always ask your healthcare professional. [place] [address] any warranty or liability for your use of this information.

## 2020-03-12 LAB
BACTERIA SPEC CULT: ABNORMAL
BACTERIA SPEC CULT: ABNORMAL
CC UR VC: ABNORMAL
SERVICE CMNT-IMP: ABNORMAL

## 2020-03-13 LAB
CYTOLOGIST CVX/VAG CYTO: ABNORMAL
CYTOLOGY CVX/VAG DOC CYTO: ABNORMAL
CYTOLOGY CVX/VAG DOC THIN PREP: ABNORMAL
CYTOLOGY HISTORY:: ABNORMAL
DX ICD CODE: ABNORMAL
HPV I/H RISK 1 DNA CVX QL PROBE+SIG AMP: POSITIVE
HPV16 DNA CVX QL PROBE+SIG AMP: NEGATIVE
HPV18 DNA CVX QL PROBE+SIG AMP: NEGATIVE
Lab: ABNORMAL
OTHER STN SPEC: ABNORMAL
STAT OF ADQ CVX/VAG CYTO-IMP: ABNORMAL

## 2020-05-19 NOTE — PROGRESS NOTES
Amenorrhea note      Юлия Grover is a 27 y.o. female who complains of absence of menses. Her current method of family planning is none at the moment. She was taking OCP but stopped it because she read that PID and OCP do not mix however she does not have PID, she has HPV which was diagnosed in March. The patient is sexually active. She developed this problem approximately 1 month ago. Associated symptoms include none. Alleviating factors: none    Aggravating factors: none      Last Pap smear:was abnormal: HPV positive. Her relevant past medical history:   Past Medical History:   Diagnosis Date    Chlamydia 2019    per pt    DM (diabetes mellitus) ([facility] [address] 2012    Type II; IDDM    Essential hypertension     hx of pre-e with previous pregnancy    Hypercholesterolemia     Migraine     Mild depression ([facility] [address] 2012    Pap smear abnormality of cervix/human papillomavirus (HPV) positive 2020, 2011    Pap smear for cervical cancer screening 13;09/28/15    Normal Pap, Neg GC    Postpartum depression 2012    Vitamin D deficiency         Past Surgical History:   Procedure Laterality Date     DELIVERY ONLY      c/s for LGA     HX  SECTION  ;2016    x2     Social History     Occupational History    Not on file   Tobacco Use    Smoking status: Never Smoker    Smokeless tobacco: Never Used   Substance and Sexual Activity    Alcohol use: No    Drug use: No    Sexual activity: Yes     Partners: Male     Birth control/protection: Pill     Family History   Problem Relation Age of Onset    Diabetes Mother         Type II    Hypertension Mother     Diabetes Sister 25        Type II    Hypertension Sister        No Known Allergies  Prior to Admission medications    Medication Sig Start Date End Date Taking?  Authorizing Provider   Lantus Solostar U-100 Insulin 100 unit/mL (3 mL) inpn INJECT 25 UNITS UNDER THE SKIN IN THE EVENING 20 Sukhdev Reid MD   ondansetron (Zofran ODT) 4 mg disintegrating tablet Take 1 Tab by mouth every eight (8) hours as needed for Nausea. 3/11/20   Omaira Carrillo MD   desogestreL-ethinyl estradioL (APRI) 0.15-0.03 mg tab TAKE 1 TABLET BY MOUTH EVERY DAY 3/4/20   Rozina Trejo MD   ibuprofen (MOTRIN) 600 mg tablet Take 1 Tab by mouth every six (6) hours as needed for Pain. 2/12/20   Travis BARR NP   methocarbamol (ROBAXIN-750) 750 mg tablet Take 1 Tab by mouth four (4) times daily as needed for Muscle Spasm(s). As needed for muscle spasm  Indications: muscle spasm 2/12/20   Travis BARR NP   acetaminophen (TYLENOL) 500 mg tablet Take 2 Tabs by mouth every six (6) hours as needed for Pain. 2/12/20   Travis BARR NP   lidocaine (LIDODERM) 5 % Apply up to 3 patches to the affected area for 12 hours a day and remove for 12 hours a day.  2/12/20   Travis BARR NP   BD INSULIN SYRINGE ULTRA-FINE 0.3 mL 31 gauge x 5/16\" syrg USE 3 TIMES A DAY 11/8/19   Sukhdev Reid MD   NOVOLOG FLEXPEN U-100 INSULIN 100 unit/mL (3 mL) inpn **NOT COVERED/PA NEEDED**INJECT 30 TO 35 UNITS THREE TIMES A DAY, BEFORE MEALS 8/16/19   Sukhdev Reid MD   naproxen sodium (ANAPROX) 550 mg tablet TAKE 1 TABLET BY MOUTH TWICE A DAY WITH MEALS 6/4/19   Rozina Trejo MD   omeprazole (PRILOSEC) 40 mg capsule TAKE 1 CAPSULE BY MOUTH EVERY DAY 1/9/19   Provider, Historical   NOVOLOG FLEXPEN U-100 INSULIN 100 unit/mL inpn **NOT COVERED/PA NEEDED**INJECT 30 TO 35 UNITS THREE TIMES A DAY, BEFORE MEALS 10/22/18   Sukhdev Reid MD   glucose blood VI test strips [facility] ULTRA TEST) strip Test blood glucose 4 daily Dx Code: E11.65 11/17/15   Polo Marroquin MD   Blood-Glucose Meter [facility] ULTRAMINI) monitoring kit Test blood glucose 4 times daily Dx Code: E11.65 11/17/15   Read MD Cara   Lancets misc Test blood glucose 4 times daily Dx Code: E11.65 11/17/15   Read MD Cara        Review of Systems - History obtained from the patient  Constitutional: negative for weight loss, fever, night sweats  HEENT: negative for hearing loss, earache, congestion, snoring, sorethroat  CV: negative for chest pain, palpitations, edema  Resp: negative for cough, shortness of breath, wheezing  Breast: negative for breast lumps, nipple discharge, galactorrhea  GI: negative for change in bowel habits, abdominal pain, black or bloody stools  : negative for frequency, dysuria, hematuria  MSK: negative for back pain, joint pain, muscle pain  Skin: negative for itching, rash, hives  Neuro: negative for dizziness, headache, confusion, weakness  Psych: negative for anxiety, depression, change in mood  Heme/lymph: negative for bleeding, bruising, pallor      Objective: There were no vitals taken for this visit.        PHYSICAL EXAMINATION    Constitutional  · Appearance: well-nourished, well developed, alert, in no acute distress    HENT  · Head and Face: appears normal    Gastrointestinal  · Abdominal Examination: abdomen non-tender to palpation, normal bowel sounds, no masses present  · Liver and spleen: no hepatomegaly present, spleen not palpable  · Hernias: no hernias identified    Genitourinary  · External Genitalia: normal appearance for age, no discharge present, no tenderness present, no inflammatory lesions present, no masses present, no atrophy present  · Vagina: normal vaginal vault without central or paravaginal defects, discharge present, no inflammatory lesions present, no masses present  · Bladder: non-tender to palpation  · Urethra: appears normal  · Cervix: normal   · Uterus: normal size, shape and consistency  · Adnexa: no adnexal tenderness present, no adnexal masses present  · Perineum: perineum within normal limits, no evidence of trauma, no rashes or skin lesions present  · Anus: anus within normal limits, no hemorrhoids present  · Inguinal Lymph Nodes: no lymphadenopathy present    Skin  · General Inspection: no rash, no lesions identified    Neurologic/Psychiatric  · Mental Status:  · Orientation: grossly oriented to person, place and time  · Mood and Affect: mood normal, affect appropriate    Assessment:   Amenorrhea, possibly due to stress or stopping OCP. Possible STD based on hx and discharge today. Plan:   Call results of STD screen. Restart OCP. Instructions given to pt. Handouts given to pt.

## 2020-05-22 ENCOUNTER — OFFICE VISIT (OUTPATIENT)
Dept: OBGYN CLINIC | Age: 30
End: 2020-05-22

## 2020-05-22 DIAGNOSIS — N91.2 AMENORRHEA: Primary | ICD-10-CM

## 2020-05-22 DIAGNOSIS — Z11.3 VENEREAL DISEASE SCREENING: ICD-10-CM

## 2020-05-22 LAB
HCG URINE, QL. (POC): NEGATIVE
VALID INTERNAL CONTROL?: YES

## 2020-05-22 NOTE — PATIENT INSTRUCTIONS
Delayed Menstrual Periods: Care Instructions Your Care Instructions Some young women start their menstrual periods later than others. They may have pubic hair and breasts but still not have periods. When a woman does not get her period every month as expected or does not get her first period by the time she is 13, it is called amenorrhea. Amenorrhea can happen for many reasons. Sometimes it's caused by a problem with the reproductive organs or another medical problem. Other times it's caused by doing hard exercise for long periods of time. It can also happen if you don't eat well or if you diet too much or have an eating disorder. Pregnancy and certain types of birth control can also delay your periods. Your doctor will want to find out why your period hasn't started. You may need to make some diet and exercise changes. These may help start your period. Or you may need treatment for another problem. Follow-up care is a key part of your treatment and safety. Be sure to make and go to all appointments, and call your doctor if you are having problems. It's also a good idea to know your test results and keep a list of the medicines you take. How can you care for yourself at home? · Eat a healthy, balanced diet. Include fruits, vegetables, whole grains, proteins, and low-fat dairy products. · Stay at a healthy weight. Ask your doctor what a healthy weight is for you. · Get regular exercise. But don't do hard, long exercise. Ask your doctor how much is too much. · If you are embarrassed about not having your period yet, talk to family members about how you are feeling. You can also talk to your doctor or a counselor. When should you call for help? Watch closely for changes in your health, and be sure to contact your doctor if: 
  · You do not get your period as expected.  
  · You think you might be pregnant. Where can you learn more? Go to http://kirstin-arsenio.info/ Enter [phone] in the search box to learn more about \"Delayed Menstrual Periods: Care Instructions. \" Current as of: November 7, 2019Content Version: 12.4 © 7499-0778 [employer]. Care instructions adapted under license by [employer] (which disclaims liability or warranty for this information). If you have questions about a medical condition or this instruction, always ask your healthcare professional. [place] [address] any warranty or liability for your use of this information.

## 2020-05-28 LAB
C TRACH RRNA SPEC QL NAA+PROBE: POSITIVE
N GONORRHOEA RRNA SPEC QL NAA+PROBE: NEGATIVE
T VAGINALIS DNA SPEC QL NAA+PROBE: NEGATIVE

## 2020-05-28 RX ORDER — METRONIDAZOLE 500 MG/1
2000 TABLET ORAL
Qty: 4 TAB | Refills: 1 | Status: SHIPPED | OUTPATIENT
Start: 2020-05-28 | End: 2020-12-02

## 2020-05-29 NOTE — PROGRESS NOTES
Patient has been advised. RX has been sent with 1 refill. . Advised no unprotected intercourse until 2 weeks after treatment. Partner needs to be treated as well. Recommended ADONIS testing.

## 2020-06-15 PROBLEM — E66.01 SEVERE OBESITY (BMI 35.0-39.9) WITH COMORBIDITY (HCC): Status: ACTIVE | Noted: 2020-06-15

## 2020-06-15 PROBLEM — D75.839 THROMBOCYTOSIS: Chronic | Status: ACTIVE | Noted: 2019-03-01

## 2020-06-15 PROBLEM — Z91.199 NONADHERENCE TO MEDICAL TREATMENT: Status: ACTIVE | Noted: 2020-06-15

## 2020-07-09 DIAGNOSIS — Z87.440 HISTORY OF UTI: Primary | ICD-10-CM

## 2020-07-09 RX ORDER — NITROFURANTOIN 25; 75 MG/1; MG/1
100 CAPSULE ORAL 2 TIMES DAILY
Qty: 14 CAP | Refills: 1 | Status: SHIPPED | OUTPATIENT
Start: 2020-07-09 | End: 2020-07-16

## 2020-09-10 NOTE — PROGRESS NOTES
Vaginitis evaluation    Chief Complaint   Vaginitis      HPI  34 y.o. female complains of yellow vaginal discharge with slight odor and mainly external itching for 14 days. Patient's last menstrual period was 2019. She denies additional symptoms at this time except some recurring cramping not on menses. The patient denies aggravating factors. She is concerned about possible STI exposure at this time and would like testing. She denies exposure to new chemicals ot hygenic agents  Previous treatment included: none    Past Medical History:   Diagnosis Date    Chlamydia 2019    per pt    DM (diabetes mellitus) ([facility] [address] 2012    Type II    Essential hypertension     hx of pre-e with previous pregnancy    HPV test positive 11    Neg 16&18    Hypercholesterolemia     Migraine     [facility]    Pap smear for cervical cancer screening 13;09/28/15    Normal Pap, Neg GC    Postpartum depression 2012    Vitamin D deficiency      Past Surgical History:   Procedure Laterality Date     DELIVERY ONLY      c/s for LGA     HX  SECTION  ;2016    x2     Social History     Occupational History    Not on file   Tobacco Use    Smoking status: Never Smoker    Smokeless tobacco: Never Used   Substance and Sexual Activity    Alcohol use: No    Drug use: No    Sexual activity: Yes     Partners: Male     Birth control/protection: Pill     Family History   Problem Relation Age of Onset    Diabetes Mother         Type II    Hypertension Mother     Diabetes Sister 25        Type II    Hypertension Sister         No Known Allergies  Prior to Admission medications    Medication Sig Start Date End Date Taking?  Authorizing Provider   BD INSULIN SYRINGE ULTRA-FINE 0.3 mL 31 gauge x 516\" syrg USE 3 TIMES A DAY 19   Nettie Kwon MD   APRI 0.15-0.03 mg tab TAKE 1 TABLET BY MOUTH EVERY DAY 10/22/19   Comfort Monge MD   NOVOLOG FLEXPEN U-100 INSULIN 100 unit/mL (3 mL) inpn **NOT COVERED/PA NEEDED**INJECT 30 TO 35 UNITS THREE TIMES A DAY, BEFORE MEALS 8/16/19   Jules Reyna MD   APRI 0.15-0.03 mg tab TAKE 1 TABLET BY MOUTH EVERY DAY 8/2/19   Alfredito Rivera MD   naproxen sodium (ANAPROX) 550 mg tablet TAKE 1 TABLET BY MOUTH TWICE A DAY WITH MEALS 6/4/19   Alfredito Rivera MD   omeprazole (PRILOSEC) 40 mg capsule TAKE 1 CAPSULE BY MOUTH EVERY DAY 1/9/19   Provider, Historical   ibuprofen (ADVIL) 200 mg tablet Take  by mouth. Provider, Historical   insulin glargine (LANTUS SOLOSTAR U-100 INSULIN) 100 unit/mL (3 mL) inpn 22 Units by SubCUTAneous route nightly.  12/11/18   Jules Reyna MD   APRI 0.15-0.03 mg tab TAKE 1 TABLET BY MOUTH EVERY DAY 11/26/18   Alfredito Rivera MD   NOVOLOG FLEXPEN U-100 INSULIN 100 unit/mL inpn **NOT COVERED/PA NEEDED**INJECT 30 TO 35 UNITS THREE TIMES A DAY, BEFORE MEALS 10/22/18   Jules Reyna MD   glucose blood VI test strips [facility] ULTRA TEST) strip Test blood glucose 4 daily Dx Code: E11.65 11/17/15   Felisha Odonnell MD   Blood-Glucose Meter (ONETOUCH ULTRAMINI) monitoring kit Test blood glucose 4 times daily Dx Code: E11.65 11/17/15   Felisha Odonnell MD   Lancets misc Test blood glucose 4 times daily Dx Code: E11.65 11/17/15   Felisha Odonnell MD                      Review of Systems - History obtained from the patient  Constitutional: negative for weight loss, fever, night sweats  Breast: negative for breast lumps, nipple discharge, galactorrhea  GI: negative for change in bowel habits, abdominal pain, black or bloody stools  : negative for frequency, dysuria, hematuria  MSK: negative for back pain, joint pain, muscle pain  Skin: negative for itching, rash, hives  Neuro: negative for dizziness, headache, confusion, weakness  Psych: negative for anxiety, depression, change in mood  Heme/lymph: negative for bleeding, bruising, pallor       Objective:    Visit Vitals  /64   Ht 5' 2\" (1.575 m)   Wt 140 lb (63.5 kg)   LMP 12/06/2019   BMI 25.61 kg/m²       Physical Exam:   PHYSICAL EXAMINATION    Constitutional  · Appearance: well-nourished, well developed, alert, in no acute distress    HENT  · Head and Face: appears normal    Genitourinary  · External Genitalia: normal appearance for age, creamy discharge present, no tenderness present, no inflammatory lesions present, no masses present, no atrophy present  · Vagina:  creamy discharge present, otherwise normal vaginal vault without central or paravaginal defects, no inflammatory lesions present, no masses present  · Bladder: non-tender to palpation  · Urethra: appears normal  · Cervix: normal   · Uterus: normal size, shape and consistency  · Adnexa: no adnexal tenderness present, no adnexal masses present  · Perineum: perineum within normal limits, no evidence of trauma, no rashes or skin lesions present  · Anus: anus within normal limits, no hemorrhoids present  · Inguinal Lymph Nodes: no lymphadenopathy present    Skin  · General Inspection: no rash, no lesions identified    Neurologic/Psychiatric  · Mental Status:  · Orientation: grossly oriented to person, place and time  · Mood and Affect: mood normal, affect appropriate      No results found for any visits on 12/20/19. Assessment:   bacterial vaginosis    Plan:   Treatment: MetroGel daily x 5 days and abstain from coitus during course of treatment  Otherwise notify if NS+ shows something else. ROV prn if symptoms persist or worsen. [Follow-Up: _____] : a [unfilled] follow-up visit

## 2020-09-18 ENCOUNTER — TELEPHONE (OUTPATIENT)
Dept: OBGYN CLINIC | Age: 30
End: 2020-09-18

## 2020-09-18 NOTE — TELEPHONE ENCOUNTER
Pt LM requesting a refill on Metronidazole. Attempted to contact pt to get more details but got VM. I left a detailed message for pt to follow if she needs medical assistance or advice over the weekend.

## 2020-09-21 ENCOUNTER — TELEPHONE (OUTPATIENT)
Dept: OBGYN CLINIC | Age: 30
End: 2020-09-21

## 2020-09-21 NOTE — TELEPHONE ENCOUNTER
Patient called back and verified she wanted refill of Metrogel. Did not leave any details about why she needs it. Patient feels she has BV. Vaginal discharge (yellow both thin and thick) with foul vinegar odor. Ongoing x 2 weeks. Wearing a pad to catch it.

## 2020-09-21 NOTE — TELEPHONE ENCOUNTER
2:14 pm- Patient phone call on voicemail was left and was very staticy. Hard to hear what medication she requested to refill.   Sounded like Metronidazole but need to verify, LMTCB

## 2020-09-22 ENCOUNTER — OFFICE VISIT (OUTPATIENT)
Dept: OBGYN CLINIC | Age: 30
End: 2020-09-22
Payer: COMMERCIAL

## 2020-09-22 DIAGNOSIS — N91.2 AMENORRHEA: ICD-10-CM

## 2020-09-22 DIAGNOSIS — N76.0 VAGINITIS AND VULVOVAGINITIS: Primary | ICD-10-CM

## 2020-09-22 LAB
HCG URINE, QL. (POC): NEGATIVE
VALID INTERNAL CONTROL?: YES

## 2020-09-22 PROCEDURE — 81025 URINE PREGNANCY TEST: CPT | Performed by: OBSTETRICS & GYNECOLOGY

## 2020-09-22 PROCEDURE — 99214 OFFICE O/P EST MOD 30 MIN: CPT | Performed by: OBSTETRICS & GYNECOLOGY

## 2020-09-22 NOTE — PROGRESS NOTES
Vaginitis evaluation    Chief Complaint   Vaginitis      HPI  27 y.o. female complains of yellow/foul vaginal discharge for 2 weeks. No LMP recorded. She denies additional symptoms at this time. The patient denies aggravating factors. She is concerned about possible STI exposure at this time. She denies exposure to new chemicals or hygenic agents  Previous treatment included: none    Also has had no period since stopping her OCP (for no apparent reason) a couple months ago. Does not want pregnancy. No pregnancy symptoms. Past Medical History:   Diagnosis Date    Chlamydia 2019    per pt    DM (diabetes mellitus) ([address] 2012    Type II; IDDM    Essential hypertension     hx of pre-e with previous pregnancy    Hypercholesterolemia     Migraine     Mild depression ([address] 2012    Pap smear abnormality of cervix/human papillomavirus (HPV) positive 2020, 2011    Pap smear for cervical cancer screening 13;09/28/15    Normal Pap, Neg GC    Postpartum depression 2012    Thrombocytosis ([facility] 3/1/2019    Trichomonas infection 2020    Vitamin D deficiency      Past Surgical History:   Procedure Laterality Date     DELIVERY ONLY      c/s for LGA     HX  SECTION  ;2016    x2     Social History     Occupational History    Not on file   Tobacco Use    Smoking status: Never Smoker    Smokeless tobacco: Never Used   Substance and Sexual Activity    Alcohol use: No    Drug use: No    Sexual activity: Yes     Partners: Male     Birth control/protection: Pill     Family History   Problem Relation Age of Onset    Diabetes Mother         Type II    Hypertension Mother     Diabetes Sister 25        Type II    Hypertension Sister         No Known Allergies  Prior to Admission medications    Medication Sig Start Date End Date Taking? Authorizing Provider   metroNIDAZOLE (FLAGYL) 500 mg tablet Take 4 Tabs by mouth Once at Delivery for 1 dose. 5/28/20   MD Kim Gilus Solostar U-100 Insulin 100 unit/mL (3 mL) inpn INJECT 25 UNITS UNDER THE SKIN IN THE EVENING 4/1/20   Lara Eller MD   ondansetron (Zofran ODT) 4 mg disintegrating tablet Take 1 Tab by mouth every eight (8) hours as needed for Nausea. 3/11/20   Maryjo Carrillo MD   desogestreL-ethinyl estradioL (APRI) 0.15-0.03 mg tab TAKE 1 TABLET BY MOUTH EVERY DAY 3/4/20   Rosie Leon MD   ibuprofen (MOTRIN) 600 mg tablet Take 1 Tab by mouth every six (6) hours as needed for Pain. 2/12/20   Kristel BARR NP   methocarbamol (ROBAXIN-750) 750 mg tablet Take 1 Tab by mouth four (4) times daily as needed for Muscle Spasm(s). As needed for muscle spasm  Indications: muscle spasm 2/12/20   Kristel BARR NP   acetaminophen (TYLENOL) 500 mg tablet Take 2 Tabs by mouth every six (6) hours as needed for Pain. 2/12/20   Kristel BARR NP   lidocaine (LIDODERM) 5 % Apply up to 3 patches to the affected area for 12 hours a day and remove for 12 hours a day.  2/12/20   Kristel BARR NP   BD INSULIN SYRINGE ULTRA-FINE 0.3 mL 31 gauge x 5/16\" syrg USE 3 TIMES A DAY 11/8/19   Lara Eller MD   NOVOLOG FLEXPEN U-100 INSULIN 100 unit/mL (3 mL) inpn **NOT COVERED/PA NEEDED**INJECT 30 TO 35 UNITS THREE TIMES A DAY, BEFORE MEALS 8/16/19   Lara Eller MD   naproxen sodium (ANAPROX) 550 mg tablet TAKE 1 TABLET BY MOUTH TWICE A DAY WITH MEALS 6/4/19   Rosie Leon MD   omeprazole (PRILOSEC) 40 mg capsule TAKE 1 CAPSULE BY MOUTH EVERY DAY 1/9/19   Francesco, Kamlesh   NOVOLOG FLEXPEN U-100 INSULIN 100 unit/mL inpn **NOT COVERED/PA NEEDED**INJECT 30 TO 35 UNITS THREE TIMES A DAY, BEFORE MEALS 10/22/18   Lara Eller MD   glucose blood VI test strips [facility] ULTRA TEST) strip Test blood glucose 4 daily Dx Code: E11.65 11/17/15   Hope Fregoso MD   Blood-Glucose Meter [facility] ULTRAMINI) monitoring kit Test blood glucose 4 times daily Dx Code: E11.65 11/17/15   Hope Fregoso MD Lancets misc Test blood glucose 4 times daily Dx Code: E11.65 11/17/15   Rose Mary Solano MD                      Review of Systems - History obtained from the patient  Constitutional: negative for weight loss, fever, night sweats  Breast: negative for breast lumps, nipple discharge, galactorrhea  GI: negative for change in bowel habits, abdominal pain, black or bloody stools  : negative for frequency, dysuria, hematuria  MSK: negative for back pain, joint pain, muscle pain  Skin: negative for itching, rash, hives  Neuro: negative for dizziness, headache, confusion, weakness  Psych: negative for anxiety, depression, change in mood  Heme/lymph: negative for bleeding, bruising, pallor       Objective:    Visit Vitals  Breastfeeding No       Physical Exam:   PHYSICAL EXAMINATION    Constitutional  · Appearance: well-nourished, well developed, alert, in no acute distress    HENT  · Head and Face: appears normal    Genitourinary  · External Genitalia: normal appearance for age, no discharge present, no tenderness present, no inflammatory lesions present, no masses present, no atrophy present  · Vagina:  BV type discharge present, otherwise normal vaginal vault without central or paravaginal defects, no inflammatory lesions present, no masses present  · Bladder: non-tender to palpation  · Urethra: appears normal  · Cervix: normal   · Uterus: normal size, shape and consistency  · Adnexa: no adnexal tenderness present, no adnexal masses present  · Perineum: perineum within normal limits, no evidence of trauma, no rashes or skin lesions present  · Anus: anus within normal limits, no hemorrhoids present  · Inguinal Lymph Nodes: no lymphadenopathy present    Skin  · General Inspection: no rash, no lesions identified    Neurologic/Psychiatric  · Mental Status:  · Orientation: grossly oriented to person, place and time  · Mood and Affect: mood normal, affect appropriate    No results found for any visits on 09/22/20. Assessment:   Likely bacterial vaginosis--will wait for NS+  Amenorrhea. Plan:   Treatment: per NS+  Restart OCP. ROV prn if symptoms persist or worsen.

## 2020-09-22 NOTE — PATIENT INSTRUCTIONS
Vaginitis: Care Instructions Your Care Instructions Vaginitis is soreness or infection of the vagina. This common problem can cause itching and burning. And it can cause a change in vaginal discharge. Sometimes it can cause pain during sex. Vaginitis may be caused by bacteria, yeast, or other germs. Some infections that cause it are caught from a sexual partner. Bath products, spermicides, and douches can irritate the vagina too. Some women have this problem during and after menopause. A drop in estrogen levels during this time can cause dryness, soreness, and pain during sex. Your doctor can give you medicine to treat an infection. And home care may help you feel better. For certain types of infections, your sex partner must be treated too. Follow-up care is a key part of your treatment and safety. Be sure to make and go to all appointments, and call your doctor if you are having problems. It's also a good idea to know your test results and keep a list of the medicines you take. How can you care for yourself at home? · If your doctor prescribed antibiotics, take them as directed. Do not stop taking them just because you feel better. You need to take the full course of antibiotics. · Take your medicines exactly as prescribed. Call your doctor if you think you are having a problem with your medicine. · Do not eat or drink anything that has alcohol if you are taking metronidazole (Flagyl). · If you have a yeast infection, use over-the-counter products as your doctor tells you to. Or take medicine your doctor prescribes exactly as directed. · Wash your vaginal area daily with water. You also can use a mild, unscented soap if you want. · Do not use scented bath products. And do not use vaginal sprays or douches. · Put a washcloth soaked in cool water on the area to relieve itching. Or you can take cool baths.  
· If you have dryness because of menopause, use estrogen cream or pills that your doctor prescribes. · Ask your doctor about when it is okay to have sex. · Use a personal lubricant before sex if you have dryness. Examples are Astroglide, K-Y Jelly, and Wet Lubricant Gel. · Ask your doctor if your sex partner also needs treatment. When should you call for help? Call your doctor now or seek immediate medical care if: 
  · You have a fever and pelvic pain. Watch closely for changes in your health, and be sure to contact your doctor if: 
  · You have bleeding other than your period.  
  · You do not get better as expected. Where can you learn more? Go to http://kirstin-arsenio.info/ Enter S833 in the search box to learn more about \"Vaginitis: Care Instructions. \" Current as of: November 8, 2019               Content Version: 12.6 © 8061-4207 [employer]. Care instructions adapted under license by [employer] (which disclaims liability or warranty for this information). If you have questions about a medical condition or this instruction, always ask your healthcare professional. [address] any warranty or liability for your use of this information.

## 2020-09-26 LAB
A VAGINAE DNA VAG QL NAA+PROBE: NORMAL SCORE
BVAB2 DNA VAG QL NAA+PROBE: NORMAL SCORE
C ALBICANS DNA VAG QL NAA+PROBE: NEGATIVE
C GLABRATA DNA VAG QL NAA+PROBE: NEGATIVE
C TRACH DNA VAG QL NAA+PROBE: NEGATIVE
MEGA1 DNA VAG QL NAA+PROBE: NORMAL SCORE
N GONORRHOEA DNA VAG QL NAA+PROBE: NEGATIVE
T VAGINALIS DNA VAG QL NAA+PROBE: NEGATIVE

## 2020-10-02 DIAGNOSIS — N76.0 VAGINITIS AND VULVOVAGINITIS: Primary | ICD-10-CM

## 2020-10-02 RX ORDER — METRONIDAZOLE 500 MG/1
500 TABLET ORAL 2 TIMES DAILY
Qty: 14 TAB | Refills: 1 | Status: SHIPPED | OUTPATIENT
Start: 2020-10-02 | End: 2020-10-09

## 2020-10-02 NOTE — TELEPHONE ENCOUNTER
Patient of [address] [place] [address] Dr Padgett- called to review labs. Kelsi Seo MD   9/28/2020 11:00 AM       Notify BV discharge even though not on Nuswab--Rx Flagyl 500 bid for 7 days, one refill          She was told that due to discharge that you were sending in Flagyl 500 mg bid 7d. She said she had spoken with you but you may have forgotten that she only wants Metrogel. Ok to switch? Pended order. Please review, sign or decline. Thanks!

## 2020-10-04 RX ORDER — METRONIDAZOLE 7.5 MG/G
1 GEL VAGINAL DAILY
Qty: 70 G | Refills: 0 | Status: SHIPPED | OUTPATIENT
Start: 2020-10-04 | End: 2020-11-02

## 2020-11-02 RX ORDER — METRONIDAZOLE 7.5 MG/G
GEL VAGINAL
Qty: 70 G | Refills: 0 | Status: SHIPPED | OUTPATIENT
Start: 2020-11-02 | End: 2021-11-10 | Stop reason: ALTCHOICE

## 2020-11-17 ENCOUNTER — OFFICE VISIT (OUTPATIENT)
Dept: OBGYN CLINIC | Age: 30
End: 2020-11-17
Payer: COMMERCIAL

## 2020-11-17 DIAGNOSIS — N91.2 AMENORRHEA: Primary | ICD-10-CM

## 2020-11-17 LAB
HCG URINE, QL. (POC): NEGATIVE
VALID INTERNAL CONTROL?: YES

## 2020-11-17 PROCEDURE — 99213 OFFICE O/P EST LOW 20 MIN: CPT | Performed by: OBSTETRICS & GYNECOLOGY

## 2020-11-17 PROCEDURE — 81025 URINE PREGNANCY TEST: CPT | Performed by: OBSTETRICS & GYNECOLOGY

## 2020-11-17 NOTE — PROGRESS NOTES
Infertility Evaluation     Margy Ricks is a ,  27 y.o. female [address] Patient's last menstrual period was 10/07/2020 (exact date). who presents with had concerns including Family Planning. She is interested in getting pregnant. She and her partner have been attempting pregnancy for 2 months. They have intercourse several times per week. Her menses do not occur monthly and she does not have  moliminal symptoms. (breast tenderness, mood changes, bloating, acne). She has not done ovulation testing at home. Her cycles do  seem to be ovulatory. The patient denies risk factors for tubal cause of infertility. Her current partner has not successfully impregnated a previous partner in the past.    He has the following risk factors for male infertility: none but would like sperm testing. Previous evaluation:She has not had a previous evaluation for infertility consisting of ovulation kits, basal body temperature charts, hysterosalpingogram, semen analysis. She has not consulted in the past with an infertility specialist.  She has not been treated with Clomid in the past.     She has questions regarding: the etiology and treatment of (anovulatory, tubal factor, male factor, primary, and secondary) infertility.     Past Medical History:   Diagnosis Date    Chlamydia 2019    per pt    DM (diabetes mellitus) ([address] 2012    Type II; IDDM    Hypercholesterolemia     Migraine     Mild depression ([address] 2012    Pap smear abnormality of cervix/human papillomavirus (HPV) positive 2020, 2011    Postpartum depression 2012    Preeclampsia     Thrombocytosis ([address] 3/1/2019    Trichomonas infection 2020    Vitamin D deficiency      Past Surgical History:   Procedure Laterality Date     DELIVERY ONLY      c/s for LGA     HX  SECTION  ;2016    x2     Social History     Occupational History    Not on file   Tobacco Use  Smoking status: Never Smoker    Smokeless tobacco: Never Used   Substance and Sexual Activity    Alcohol use: No    Drug use: No    Sexual activity: Yes     Partners: Male     Birth control/protection: None     Family History   Problem Relation Age of Onset    Diabetes Mother         Type II    Hypertension Mother     Diabetes Sister 25        Type II    Hypertension Sister        No Known Allergies  Prior to Admission medications    Medication Sig Start Date End Date Taking? Authorizing Provider   metroNIDAZOLE (METROGEL) 0.75 % gel INSERT 5GM INTO VAGINA DAILY FOR 5 DAYS 11/2/20   Felicitas Montesinos MD   metroNIDAZOLE (FLAGYL) 500 mg tablet Take 4 Tabs by mouth Once at Delivery for 1 dose. 5/28/20   Felicitas Montesinos MD   Lantus Solostar U-100 Insulin 100 unit/mL (3 mL) inpn INJECT 25 UNITS UNDER THE SKIN IN THE EVENING 4/1/20   Klever Zamudio MD   ondansetron (Zofran ODT) 4 mg disintegrating tablet Take 1 Tab by mouth every eight (8) hours as needed for Nausea. 3/11/20   Coyner, Merline Browns, MD   desogestreL-ethinyl estradioL (APRI) 0.15-0.03 mg tab TAKE 1 TABLET BY MOUTH EVERY DAY 3/4/20   Felicitas Montesinos MD   ibuprofen (MOTRIN) 600 mg tablet Take 1 Tab by mouth every six (6) hours as needed for Pain. 2/12/20   Brittany BARR NP   methocarbamol (ROBAXIN-750) 750 mg tablet Take 1 Tab by mouth four (4) times daily as needed for Muscle Spasm(s). As needed for muscle spasm  Indications: muscle spasm 2/12/20   Brittany BARR NP   acetaminophen (TYLENOL) 500 mg tablet Take 2 Tabs by mouth every six (6) hours as needed for Pain. 2/12/20   Brittany BARR NP   lidocaine (LIDODERM) 5 % Apply up to 3 patches to the affected area for 12 hours a day and remove for 12 hours a day.  2/12/20   Brittany BARR NP   BD INSULIN SYRINGE ULTRA-FINE 0.3 mL 31 gauge x 5/16\" syrg USE 3 TIMES A DAY 11/8/19   Klever Zamudio MD   NOVOLOG FLEXPEN U-100 INSULIN 100 unit/mL (3 mL) inpn **NOT COVERED/PA NEEDED**INJECT 30 TO 35 UNITS THREE TIMES A DAY, BEFORE MEALS 8/16/19   Pepe Ortiz MD   naproxen sodium (ANAPROX) 550 mg tablet TAKE 1 TABLET BY MOUTH TWICE A DAY WITH MEALS 6/4/19   Jerome Ty MD   omeprazole (PRILOSEC) 40 mg capsule TAKE 1 CAPSULE BY MOUTH EVERY DAY 1/9/19   Provider, Kamlesh   NOVOLOG FLEXPEN U-100 INSULIN 100 unit/mL inpn **NOT COVERED/PA NEEDED**INJECT 30 TO 35 UNITS THREE TIMES A DAY, BEFORE MEALS 10/22/18   Pepe Ortiz MD   glucose blood VI test strips [facility] ULTRA TEST) strip Test blood glucose 4 daily Dx Code: E11.65 11/17/15   Yamila Jackson MD   Blood-Glucose Meter ([facility]CH ULTRAMINI) monitoring kit Test blood glucose 4 times daily Dx Code: E11.65 11/17/15   Yamila Jackson MD   Lancets misc Test blood glucose 4 times daily Dx Code: E11.65 11/17/15   Yamila Jackson MD        Review of Systems: History obtained from the patient  Constitutional: negative for weight loss, fever, night sweats  Breast: negative for breast lumps, nipple discharge, galactorrhea  GI: negative for change in bowel habits, abdominal pain, black or bloody stools  : negative for frequency, dysuria, hematuria, vaginal discharge  MSK: negative for back pain, joint pain, muscle pain  Skin: negative for itching, rash, hives  Psych: negative for anxiety, depression, change in mood    Objective:    Visit Vitals  LMP 10/07/2020 (Exact Date)       Physical Exam:     Constitutional  · Appearance: well-nourished, well developed, alert, in no acute distress    HENT  · Head and Face: appears normal    Skin  · General Inspection: no rash, no lesions identified    Neurologic/Psychiatric  · Mental Status:  · Orientation: grossly oriented to person, place and time  · Mood and Affect: mood normal, affect appropriate    Assessment:  Pt does not meet the criteria for infertility, however, as a type 1 diabetic she needs to really get her HgbA1c improved before we can address her anovulation hx.      Plan:   See endo, maximize insulin and diet control, RTO in 3-4 months when we will consider Clomid or Letrozole to induce ovulation.

## 2020-11-17 NOTE — PATIENT INSTRUCTIONS
Learning About Planning for Future Pregnancy How can you plan for pregnancy? Even before you get pregnant, you can help make your pregnancy as healthy as possible. Take these steps: 
· See a doctor or certified nurse-midwife for an exam. Talk about the medicines, vitamins, and herbs you take. Discuss any health problems or concerns you have. · Don't take nonsteroidal anti-inflammatory drugs (NSAIDs). Examples of these are ibuprofen and aspirin. They can raise your risk of miscarriage. This risk is higher around the time you conceive or if you use them for more than a week. · Take a daily multivitamin or prenatal vitamin. Make sure it has folic acid. This will lower the chance of having a baby with a birth defect. · Keep track of your menstrual cycle. This is a good idea for a few reasons. It helps you know the best time to try to get pregnant. And it can help your doctor or midwife figure out when your baby is due and how it is growing. · Make healthy choices. Eat well. Avoid caffeine. Or cut back and only have 1 cup of coffee or tea a day. Avoid alcohol, cigarettes, and illegal drugs. Take only the medicines your doctor or midwife says are okay. · Get plenty of exercise. A strong body will make pregnancy and birth easier. It will also help you recover after the birth. And exercise can help improve your mood. What other exams or tests should you have? Before trying to get pregnant, take care of any other health concerns you might have. · If you have diabetes or high blood pressure or you are obese, talk to your doctor before you get pregnant. Together, you can make a plan about how to control these health problems. · Talk with your doctor about any medicines you take. Find out if it is safe to keep taking them while you are pregnant. · Get any vaccines you might need.  They can help prevent birth defects, miscarriage, or stillbirth that can be caused by infections such as rubella or measles. Ask your doctor how long you should wait after you get a vaccine before you try to get pregnant. · Talk with your doctor about whether to have screening tests for diseases that are passed down through families (genetic conditions). These diseases include: ? Cystic fibrosis. ? Sickle cell disease. ? Molina-Sachs disease. If you think you might be pregnant · You can use a home pregnancy test as soon as the first day of your first missed menstrual period. · As soon as you know you're pregnant, make an appointment with your doctor or certified nurse-midwife. Your first prenatal visit will provide information that can be used to check for any problems as your pregnancy progresses. Where can you learn more? Go to http://www.gray.com/ Enter R439 in the search box to learn more about \"Learning About Planning for Future Pregnancy. \" Current as of: February 11, 2020               Content Version: 12.6 © 5981-0881 [employer]. Care instructions adapted under license by [employer] (which disclaims liability or warranty for this information). If you have questions about a medical condition or this instruction, always ask your healthcare professional. [place] [address] any warranty or liability for your use of this information.

## 2021-03-02 ENCOUNTER — OFFICE VISIT (OUTPATIENT)
Dept: OBGYN CLINIC | Age: 31
End: 2021-03-02
Payer: MEDICAID

## 2021-03-02 ENCOUNTER — TELEPHONE (OUTPATIENT)
Dept: OBGYN CLINIC | Age: 31
End: 2021-03-02

## 2021-03-02 DIAGNOSIS — Z11.3 VENEREAL DISEASE SCREENING: ICD-10-CM

## 2021-03-02 DIAGNOSIS — N91.2 AMENORRHEA: Primary | ICD-10-CM

## 2021-03-02 LAB
HCG URINE, QL. (POC): NEGATIVE
VALID INTERNAL CONTROL?: YES

## 2021-03-02 PROCEDURE — 81025 URINE PREGNANCY TEST: CPT | Performed by: OBSTETRICS & GYNECOLOGY

## 2021-03-02 PROCEDURE — 99214 OFFICE O/P EST MOD 30 MIN: CPT | Performed by: OBSTETRICS & GYNECOLOGY

## 2021-03-02 NOTE — PATIENT INSTRUCTIONS
Infertility: Care Instructions Your Care Instructions Infertility means that you haven't been able to get pregnant after trying for at least 1 year. It doesn't mean you'll never get pregnant. A woman's chances of getting pregnant are higher when she's younger. A woman is most able to get pregnant (fertile) in her late 25s. Then, in her mid-30s, she becomes less fertile. This is because her eggs get older. Trouble getting pregnant can be caused by a problem with the reproductive organs of a woman, a man, or both. Talk with your doctor about testing and treatment. If you have tests, you will likely start with a hormone test. This is a test for both of you. And then the man will probably have a semen test. 
There is a wide range of treatment options. They include medicines, surgery, insemination, and in vitro fertilization (IVF). Follow-up care is a key part of your treatment and safety. Be sure to make and go to all appointments, and call your doctor if you are having problems. It's also a good idea to know your test results and keep a list of the medicines you take. How can you care for yourself at home? For women · Take a multivitamin with folic acid. This helps to prevent birth defects if you do become pregnant. · Get regular exercise. But do not overdo it. Really hard and long exercise can cause you to release an egg less often. · Eat healthy foods. And drink lots of water. · Stay at a healthy weight. This will increase your chances of getting pregnant. Women who weigh too much or too little can be less fertile. · Talk to your doctor about all medicines you are taking or may take. This includes over-the-counter and prescribed medicines and herbal remedies. Some medicines interfere with pregnancy. · Write down when your period starts and stops for a few months. Bring that information to your doctor. He or she can help you figure out when you ovulate and are most likely to get pregnant if you have sex. Or you may prefer to use a home ovulation test. 
For men · Avoid hot tubs and saunas. · If you get sick and have a fever, try to control your fever. A high fever may reduce your sperm count for months. · If you exercise very hard most days of the week, reduce how much exercise you do. Hard, long exercise may lower your sperm count. · Eat a healthy diet and stay at a healthy weight. Limit alcohol to 2 drinks a day. For both men and women · If the woman knows when she will ovulate, try to have sex once a day for the 4 days before ovulation and on the day of ovulation. If the man has a low sperm count, have sex every other day. · If the woman does not know when she will ovulate, have sex 2 or 3 times each week. · Don't use lubricants during sex. They may affect how well sperm can travel to meet the woman's egg. · Avoid smoking and illegal drugs. When should you call for help? Watch closely for changes in your health, and be sure to contact your doctor if you have any problems. Where can you learn more? Go to http://www.gray.com/ Enter 187 5540 in the search box to learn more about \"Infertility: Care Instructions. \" Current as of: February 11, 2020               Content Version: 12.6 © 2006-2020 [employer], [facility]. Care instructions adapted under license by [employer] (which disclaims liability or warranty for this information). If you have questions about a medical condition or this instruction, always ask your healthcare professional. [place] [address] any warranty or liability for your use of this information.

## 2021-03-02 NOTE — PROGRESS NOTES
Infertility Evaluation     Carlos Chaparro is a ,  27 y.o. female BLACK Patient's last menstrual period was 10/26/2020. who presents with had concerns including Family Planning. She and her partner have been attempting pregnancy for 6 months. They have intercourse several times per week. Also discharge without irritation or odor. She saw her PCP Dr Polo Carpenter who increased her insulin dose and advised her to increase exercise. Her menses occur monthly and she has no moliminal symptoms. (breast tenderness, mood changes, bloating, acne). She has not done ovulation testing at home. Her cycles do not seem to be ovulatory. The patient denies risk factors for tubal cause of infertility. Her current partner has not successfully impregnated a previous partner in the past. He has the following risk factors for male infertility: none    Previous evaluation:She has not had a previous evaluation for infertility consisting of ovulation kits, basal body temperature charts, hysterosalpingogram, semen analysis. She has not consulted in the past with an infertility specialist.  She has not been treated with Clomid in the past.     She has questions regarding: the etiology and treatment of (anovulatory, tubal factor, male factor, primary, and secondary) infertility.     Past Medical History:   Diagnosis Date    Chlamydia 2019    per pt    Diabetes mellitus type 1 ([address] 2012    IDDM    Hypercholesterolemia     Migraine     Mild depression ([address] 2012    Pap smear abnormality of cervix/human papillomavirus (HPV) positive 2020, 2011    Postpartum depression 2012    Preeclampsia     Thrombocytosis ([address] 3/1/2019    Trichomonas infection 2020    Vitamin D deficiency      Past Surgical History:   Procedure Laterality Date    HX  SECTION  ;2016    x2    [place]  DELIVERY ONLY      c/s for LGA      Social History     Occupational History    Not on file Tobacco Use    Smoking status: Never Smoker    Smokeless tobacco: Never Used   Substance and Sexual Activity    Alcohol use: No    Drug use: No    Sexual activity: Yes     Partners: Male     Birth control/protection: None     Family History   Problem Relation Age of Onset    Diabetes Mother         Type II    Hypertension Mother     Diabetes Sister 25        Type II    Hypertension Sister        No Known Allergies  Prior to Admission medications    Medication Sig Start Date End Date Taking? Authorizing Provider   insulin aspart U-100 (NovoLOG Flexpen U-100 Insulin) 100 unit/mL (3 mL) inpn **NOT COVERED/PA NEEDED**INJECT 30 TO 35 UNITS THREE TIMES A DAY, BEFORE MEALS--same dose/frequency as with prior pcp Dr. Nadir Bolden 2/9/21   Vira Cranker, MD   metroNIDAZOLE (METROGEL) 0.75 % gel INSERT 5GM INTO VAGINA DAILY FOR 5 DAYS 11/2/20   Yaquelin Vazquez MD   Lantus Solostar U-100 Insulin 100 unit/mL (3 mL) inpn INJECT 25 UNITS UNDER THE SKIN IN THE EVENING 4/1/20   Yoni Mcdowell MD   acetaminophen (TYLENOL) 500 mg tablet Take 2 Tabs by mouth every six (6) hours as needed for Pain. 2/12/20   Tyrel BARR NP   lidocaine (LIDODERM) 5 % Apply up to 3 patches to the affected area for 12 hours a day and remove for 12 hours a day.  2/12/20   Tyrel BARR NP   BD INSULIN SYRINGE ULTRA-FINE 0.3 mL 31 gauge x 5/16\" syrg USE 3 TIMES A DAY 11/8/19   Yoni Mcdowell MD   omeprazole (PRILOSEC) 40 mg capsule TAKE 1 CAPSULE BY MOUTH EVERY DAY 1/9/19   Provider, Historical   glucose blood VI test strips (ONETOUCH ULTRA TEST) strip Test blood glucose 4 daily Dx Code: E11.65 11/17/15   Ricky Ponce MD   Blood-Glucose Meter [facility] ULTRAMINI) monitoring kit Test blood glucose 4 times daily Dx Code: E11.65 11/17/15   Ricky Ponce MD   Lancets misc Test blood glucose 4 times daily Dx Code: E11.65 11/17/15   Ricky Ponce MD        Review of Systems: History obtained from the patient  Constitutional: negative for weight loss, fever, night sweats  Breast: negative for breast lumps, nipple discharge, galactorrhea  GI: negative for change in bowel habits, abdominal pain, black or bloody stools  : negative for frequency, dysuria, hematuria, vaginal discharge  MSK: negative for back pain, joint pain, muscle pain  Skin: negative for itching, rash, hives  Psych: negative for anxiety, depression, change in mood    Objective:    Visit Vitals  LMP 10/26/2020       Physical Exam:     Constitutional  · Appearance: well-nourished, well developed, alert, in no acute distress    HENT  · Head and Face: appears normal    Neck  · Inspection/Palpation: normal appearance, no masses or tenderness  · Lymph Nodes: no lymphadenopathy present  · Thyroid: gland size normal, nontender, no nodules or masses present on palpation    Breasts  · Inspection of Breasts: breasts symmetrical, no skin changes, no discharge present, nipple appearance normal, no skin retraction present  · Palpation of Breasts and Axillae: no masses present on palpation, no breast tenderness  · Axillary Lymph Nodes: no lymphadenopathy present    Gastrointestinal  · Abdominal Examination: abdomen non-tender to palpation, normal bowel sounds, no masses present  · Liver and spleen: no hepatomegaly present, spleen not palpable  · Hernias: no hernias identified    Genitourinary  · External Genitalia: normal appearance for age, no discharge present, no tenderness present, no inflammatory lesions present, no masses present, no atrophy present  · Vagina: normal vaginal vault without central or paravaginal defects, moderate discharge present, no inflammatory lesions present, no masses present  · Bladder: non-tender to palpation  · Urethra: appears normal  · Cervix: normal   · Uterus: normal size, shape and consistency  · Adnexa: no adnexal tenderness present, no adnexal masses present  · Perineum: perineum within normal limits, no evidence of trauma, no rashes or skin lesions present  · Anus: anus within normal limits, no hemorrhoids present  · Inguinal Lymph Nodes: no lymphadenopathy present    Skin  · General Inspection: no rash, no lesions identified    Neurologic/Psychiatric  · Mental Status:  · Orientation: grossly oriented to person, place and time  · Mood and Affect: mood normal, affect appropriate    Assessment:  Infertility, probably of hypothalamic origin. Needed clomid to get pregnant last time. Poorly controlled T2 DM. Advised needs much better control with HgbA1c of 7 to be \"safe\" to become pregnant. STD screen sent    Plan:   Refer to DR. Lee for tight prenatal control, then consider Clomid.

## 2021-03-02 NOTE — TELEPHONE ENCOUNTER
Contacted [facility] to try and get the patient scheduled with an Endocrinologist for uncontrolled IDDM. She has not seen an endocrinologist in a few years and is interested in conceiving. Please see office note from [facility]. I spoke with the  and left a message for one of the nurses to contact me back in order to schedule the patient.

## 2021-03-03 NOTE — TELEPHONE ENCOUNTER
Ryan Mejia @ [facility] left me a voice message stating she scheduled the patient for 4/9 with Dr Dulce Lowery. I will return her call later on today to discuss more information.

## 2021-03-04 NOTE — TELEPHONE ENCOUNTER
Spoke with Mena-patient is scheduled for 4/9 @ 11am with Dr Garima Fuller. Left voice message for pt to call back to give appt information.     [facility]  [id number] [address]

## 2021-03-05 NOTE — PROGRESS NOTES
Rukhsana Gore LPN        09:31 AM  Note      Spoke with Trina Biggs is scheduled for 4/9 @ 11am with Dr René Loo.     Left voice message for pt to call back to give appt information.   Patient advised of MD reviewed lab results and verbalized understanding       Patient advised of endo appointment details on 4/9/2021 at 11:00am      [facility]  [address]

## 2021-04-15 ENCOUNTER — TELEPHONE (OUTPATIENT)
Dept: OBGYN CLINIC | Age: 31
End: 2021-04-15

## 2021-04-15 NOTE — TELEPHONE ENCOUNTER
Call received at 2:33pM      27 year old patient last seen in the office on 3/2/2021 for problem visit and had last ae on 3/4/2020    Patient calling to reports her a1 c results   Meenu Latham  to Jw Maloney MD          1:17 PM  My new a1c is 6.9 and my doctor said I'm doing great. Will I now be able to take something to help me ovulate and look into my pcos? Also the endocrinologist is not taking new patients until August so my doctor is looking for one for me. Attachments      Patient is wondering next steps          Assessment:  Infertility, probably of hypothalamic origin. Needed clomid to get pregnant last time. Poorly controlled T2 DM. Advised needs much better control with HgbA1c of 7 to be \"safe\" to become pregnant. STD screen sent     Plan:   Refer to DR. Lee for tight prenatal control, then consider Clomid.       Please advise    Thank you

## 2021-04-15 NOTE — TELEPHONE ENCOUNTER
This nurse attempted to reach the patient and left a detailed message for the patient to contact the office to set up appointment to discuss the clomid regimen and timing

## 2021-04-15 NOTE — TELEPHONE ENCOUNTER
Well [id number] [address]. Needs to come for office appt to go over the Clomid regimen and timing of everything.

## 2021-04-15 NOTE — TELEPHONE ENCOUNTER
This nurse attempted to reach the patient and left a detailed message for the patient that Dr. Marcia Najera wants a copy of the results faxed to the office    Fax number was provided to have the lab faxed to the office

## 2021-04-20 NOTE — PROGRESS NOTES
Clomid initiation note  Denton Dee is a ,  27 y.o. female BLACK/ Patient's last menstrual period was 2021 (exact date). She started spotting a few days ago. She presents with had concerns including Missed Menses. She comes to the office today with a history of oligo-ovulation. She and her partner have been attempting pregnancy for 6 months. She is here to initiate clomid therapy. The patient has a history of oligo-ovulation which is her indication for clomid therapy. She will start Clomid, 50 mg's on day 5 of the cycle for 5 days. The couple have had intercourse 2 to 4 times per week . The patient had no symptoms of ovulation. She has not had previous Clomid therapy in the past.   The patient has been advised of the risks, benefits, and alternatives of Clomid therapy.     Past Medical History:   Diagnosis Date    Chlamydia 2019    per pt    Diabetes mellitus type 1 ([facility] 2012    IDDM    Hypercholesterolemia     Migraine     Mild depression ([facility] 2012    Pap smear abnormality of cervix/human papillomavirus (HPV) positive 2020, 2011    Postpartum depression 2012    Preeclampsia     Thrombocytosis ([facility] 3/1/2019    Trichomonas infection 2020    Vitamin D deficiency      Past Surgical History:   Procedure Laterality Date    HX  SECTION  ;2016    x2    [place]  DELIVERY ONLY      c/s for LGA      Social History     Occupational History    Not on file   Tobacco Use    Smoking status: Never Smoker    Smokeless tobacco: Never Used   Substance and Sexual Activity    Alcohol use: No    Drug use: No    Sexual activity: Yes     Partners: Male     Birth control/protection: None     Family History   Problem Relation Age of Onset    Diabetes Mother         Type II    Hypertension Mother     Diabetes Sister 25        Type II    Hypertension Sister        No Known Allergies  Prior to Admission medications Medication Sig Start Date End Date Taking? Authorizing Provider   insulin aspart U-100 (NovoLOG Flexpen U-100 Insulin) 100 unit/mL (3 mL) inpn **NOT COVERED/PA NEEDED**INJECT 30 TO 35 UNITS THREE TIMES A DAY, BEFORE MEALS--same dose/frequency as with prior pcp Dr. Julián Herrera 2/9/21   Karthik Cuevas MD   metroNIDAZOLE (METROGEL) 0.75 % gel INSERT 5GM INTO VAGINA DAILY FOR 5 DAYS 11/2/20   Jimbo Hammer MD   Lantus Solostar U-100 Insulin 100 unit/mL (3 mL) inpn INJECT 25 UNITS UNDER THE SKIN IN THE EVENING 4/1/20   Patsi Crigler, MD   acetaminophen (TYLENOL) 500 mg tablet Take 2 Tabs by mouth every six (6) hours as needed for Pain. 2/12/20   Roberta BARR NP   lidocaine (LIDODERM) 5 % Apply up to 3 patches to the affected area for 12 hours a day and remove for 12 hours a day.  2/12/20   Roberta BARR NP   BD INSULIN SYRINGE ULTRA-FINE 0.3 mL 31 gauge x 5/16\" syrg USE 3 TIMES A DAY 11/8/19   Patsi Crigler, MD   omeprazole (PRILOSEC) 40 mg capsule TAKE 1 CAPSULE BY MOUTH EVERY DAY 1/9/19   Provider, Historical   glucose blood VI test strips (ONETOUCH ULTRA TEST) strip Test blood glucose 4 daily Dx Code: E11.65 11/17/15   Sharmaine Arias MD   Blood-Glucose Meter (ONETOUCH ULTRAMINI) monitoring kit Test blood glucose 4 times daily Dx Code: E11.65 11/17/15   Sharmaine Arias MD   Lancets misc Test blood glucose 4 times daily Dx Code: E11.65 11/17/15   Sharmaine Arias MD        Review of Systems: History obtained from the patient  Constitutional: negative for weight loss, fever, night sweats  Breast: negative for breast lumps, nipple discharge, galactorrhea  GI: negative for change in bowel habits, abdominal pain, black or bloody stools  : negative for frequency, dysuria, hematuria, vaginal discharge  MSK: negative for back pain, joint pain, muscle pain  Skin: negative for itching, rash, hives  Psych: negative for anxiety, depression, change in mood    Objective:    Visit Vitals  LMP 03/06/2021 (Exact Date) Physical Exam:     Constitutional  · Appearance: well-nourished, well developed, alert, in no acute distress    HENT  · Head and Face: appears normal    Skin  · General Inspection: no rash, no lesions identified    Neurologic/Psychiatric  · Mental Status:  · Orientation: grossly oriented to person, place and time  · Mood and Affect: mood normal, affect appropriate    Assessment:  Oligoamenorrhea. Hgb A1c improved. Plan: Will start Clomid today as if it is day 5 after menses onset  Rx Clomid, 50 mg days 5-9 post \"menses onset\". Advised intercourse between days 10 and 14. RTO day 28-32 for UPT and followup exam.    Instructions given.

## 2021-04-23 ENCOUNTER — OFFICE VISIT (OUTPATIENT)
Dept: OBGYN CLINIC | Age: 31
End: 2021-04-23
Payer: MEDICAID

## 2021-04-23 DIAGNOSIS — N97.0 ANOVULAR: ICD-10-CM

## 2021-04-23 DIAGNOSIS — N93.8 DUB (DYSFUNCTIONAL UTERINE BLEEDING): Primary | ICD-10-CM

## 2021-04-23 LAB
HCG URINE, QL. (POC): NEGATIVE
VALID INTERNAL CONTROL?: YES

## 2021-04-23 PROCEDURE — 81025 URINE PREGNANCY TEST: CPT | Performed by: OBSTETRICS & GYNECOLOGY

## 2021-04-23 PROCEDURE — 99214 OFFICE O/P EST MOD 30 MIN: CPT | Performed by: OBSTETRICS & GYNECOLOGY

## 2021-04-23 RX ORDER — CLOMIPHENE CITRATE 50 MG/1
50 TABLET ORAL DAILY
Qty: 5 TAB | Refills: 0 | Status: SHIPPED | OUTPATIENT
Start: 2021-04-23 | End: 2021-04-28

## 2021-04-23 NOTE — PATIENT INSTRUCTIONS
Learning About Planning for Future Pregnancy How can you plan for pregnancy? Even before you get pregnant, you can help make your pregnancy as healthy as possible. Take these steps: 
· See a doctor or certified nurse-midwife for an exam. Talk about the medicines, vitamins, and herbs you take. Discuss any health problems or concerns you have. · Don't take nonsteroidal anti-inflammatory drugs (NSAIDs) unless your doctor says it's okay. Examples of these are ibuprofen and aspirin. They can raise your risk of miscarriage. · Take a daily multivitamin or prenatal vitamin. Make sure it has folic acid. This will lower the chance of having a baby with a birth defect. · Keep track of your menstrual cycle. This is a good idea for a few reasons. It helps you know the best time to try to get pregnant. And it can help your doctor or midwife figure out when your baby is due and how it is growing. · Make healthy choices. Eat well. Avoid caffeine. Or cut back and only have 1 cup of coffee or tea a day. Avoid alcohol, cigarettes, marijuana, and illegal drugs. Take only the medicines your doctor or midwife says are okay. · Get plenty of exercise. A strong body will make pregnancy and birth easier. It will also help you recover after the birth. And exercise can help improve your mood. What other exams or tests should you have? Before trying to get pregnant, take care of any other health concerns you might have. · If you have diabetes or high blood pressure or you are obese, talk to your doctor before you get pregnant. Together, you can make a plan about how to control these health problems. · Talk with your doctor about any medicines you take. Find out if it is safe to keep taking them while you are pregnant. · Get any vaccines you might need. They can help prevent birth defects, miscarriage, or stillbirth that can be caused by infections such as rubella or measles.  Ask your doctor how long you should wait after you get a vaccine before you try to get pregnant. · Talk with your doctor about whether to have screening tests for diseases that are passed down through families (genetic conditions). These diseases include: ? Cystic fibrosis. ? Sickle cell disease. ? Molina-Sachs disease. If you think you might be pregnant · You can use a home pregnancy test as soon as the first day of your first missed menstrual period. · As soon as you know you're pregnant, make an appointment with your doctor or certified nurse-midwife. Your first prenatal visit will provide information that can be used to check for any problems as your pregnancy progresses. Where can you learn more? Go to http://www.gray.com/ Enter K207 in the search box to learn more about \"Learning About Planning for Future Pregnancy. \" Current as of: October 8, 2020               Content Version: 12.8 © 7539-9583 [employer]. Care instructions adapted under license by [employer] (which disclaims liability or warranty for this information). If you have questions about a medical condition or this instruction, always ask your healthcare professional. [address] any warranty or liability for your use of this information.

## 2021-05-18 ENCOUNTER — PATIENT MESSAGE (OUTPATIENT)
Dept: OBGYN CLINIC | Age: 31
End: 2021-05-18

## 2021-05-21 NOTE — PROGRESS NOTES
Clomid followup note  Meryle Pinal is a ,  32 y.o. female BLACK/ Patient's last menstrual period was 2021. who presents with had concerns including Infertility (Clomid f/u). .  She comes to the office today with a history of oligo-ovulation. She and her partner have been attempting pregnancy for one year. She is here to followup on clomid therapy. The patient has a history of oligo-ovulation which is her indication for clomid therapy. She is now on day 36 of this cycle and did not show signs of ovulation. She is cramping today. She has taken Clomid, 50 mg's on 21 for 5 days. The couple have had intercourse 2 to 4 times per week . The patient had no symptoms of ovulation on that dose and returns for followup. The patient has been re-advised of the risks, benefits, and alternatives of Clomid therapy.     Past Medical History:   Diagnosis Date    Chlamydia 2019    per pt    Diabetes mellitus type 1 ([facility] [address] [facility] 2012    IDDM    Hypercholesterolemia     Migraine     Mild depression ([address] 2012    Pap smear abnormality of cervix/human papillomavirus (HPV) positive 2020, 2011    Postpartum depression 2012    Preeclampsia     Thrombocytosis ([facility] 3/1/2019    Trichomonas infection 2020    Vitamin D deficiency      Past Surgical History:   Procedure Laterality Date    HX  SECTION  ;2016    x2    [place]  DELIVERY ONLY      c/s for LGA      Social History     Occupational History    Not on file   Tobacco Use    Smoking status: Never Smoker    Smokeless tobacco: Never Used   Substance and Sexual Activity    Alcohol use: No    Drug use: No    Sexual activity: Yes     Partners: Male     Birth control/protection: None     Family History   Problem Relation Age of Onset    Diabetes Mother         Type II    Hypertension Mother     Diabetes Sister 25        Type II    Hypertension Sister        No Known Allergies  Prior to Admission medications    Medication Sig Start Date End Date Taking? Authorizing Provider   nitrofurantoin, macrocrystal-monohydrate, (MACROBID) 100 mg capsule  5/20/21  Yes Provider, Historical   metroNIDAZOLE (FLAGYL) 500 mg tablet  5/20/21  Yes Provider, Historical   insulin aspart U-100 (NovoLOG Flexpen U-100 Insulin) 100 unit/mL (3 mL) inpn **NOT COVERED/PA NEEDED**INJECT 30 TO 35 UNITS THREE TIMES A DAY, BEFORE MEALS--same dose/frequency as with prior pcp Dr. Teri Hayden 2/9/21  Yes Monika Garay MD   [place] Solostar U-100 Insulin 100 unit/mL (3 mL) inpn INJECT 25 UNITS UNDER THE SKIN IN THE EVENING 4/1/20  Yes Chandler Morin MD   BD INSULIN SYRINGE ULTRA-FINE 0.3 mL 31 gauge x 5/16\" syrg USE 3 TIMES A DAY 11/8/19  Yes Chandler Morin MD   glucose blood VI test strips (ONETOUCH ULTRA TEST) strip Test blood glucose 4 daily Dx Code: E11.65 11/17/15  Yes Lalo Maxwell MD   Blood-Glucose Meter (ONETOUCH ULTRAMINI) monitoring kit Test blood glucose 4 times daily Dx Code: E11.65 11/17/15  Yes Lalo Maxwell MD   Lancets misc Test blood glucose 4 times daily Dx Code: E11.65 11/17/15  Yes Lalo Maxwell MD   metroNIDAZOLE (METROGEL) 0.75 % gel INSERT 5GM INTO VAGINA DAILY FOR 5 DAYS 11/2/20   Ru Perez MD   acetaminophen (TYLENOL) 500 mg tablet Take 2 Tabs by mouth every six (6) hours as needed for Pain. Patient not taking: Reported on 5/24/2021 2/12/20   Tori BARR NP   lidocaine (LIDODERM) 5 % Apply up to 3 patches to the affected area for 12 hours a day and remove for 12 hours a day.  2/12/20   Lauro Brantley NP   omeprazole (PRILOSEC) 40 mg capsule TAKE 1 CAPSULE BY MOUTH EVERY DAY 1/9/19   Provider, Historical        Review of Systems: History obtained from the patient  Constitutional: negative for weight loss, fever, night sweats  Breast: negative for breast lumps, nipple discharge, galactorrhea  GI: negative for change in bowel habits, abdominal pain, black or bloody stools  : negative for frequency, dysuria, hematuria, vaginal discharge  MSK: negative for back pain, joint pain, muscle pain  Skin: negative for itching, rash, hives  Psych: negative for anxiety, depression, change in mood    Objective:    Visit Vitals  /60   Ht 5' 2\" (1.575 m)   Wt 150 lb (68 kg)   LMP 03/06/2021   BMI 27.44 kg/m²       Physical Exam:     Constitutional  · Appearance: well-nourished, well developed, alert, in no acute distress    HENT  · Head and Face: appears normal    Neck  · Inspection/Palpation: normal appearance, no masses or tenderness  · Lymph Nodes: no lymphadenopathy present  · Thyroid: gland size normal, nontender, no nodules or masses present on palpation    Gastrointestinal  · Abdominal Examination: abdomen non-tender to palpation, normal bowel sounds, no masses present  · Liver and spleen: no hepatomegaly present, spleen not palpable  · Hernias: no hernias identified    Genitourinary  · External Genitalia: normal appearance for age, no discharge present, no tenderness present, no inflammatory lesions present, no masses present, no atrophy present  · Vagina: normal vaginal vault without central or paravaginal defects, no discharge present, no inflammatory lesions present, no masses present  · Bladder: non-tender to palpation  · Urethra: appears normal  · Cervix: normal   · Uterus: normal size, shape and consistency  · Adnexa: no adnexal tenderness present, no adnexal masses present  · Perineum: perineum within normal limits, no evidence of trauma, no rashes or skin lesions present  · Anus: anus within normal limits, no hemorrhoids present  · Inguinal Lymph Nodes: no lymphadenopathy present    Skin  · General Inspection: no rash, no lesions identified    Neurologic/Psychiatric  · Mental Status:  · Orientation: grossly oriented to person, place and time  · Mood and Affect: mood normal, affect appropriate    Assessment:  Oligoamenorrhea.     Plan:   Her las unprotected intercourse was 3 days a ago. She will wait 2 days and RTO for HCG. If negative then take provera and increase clomid to 100 mg  Rx Provera 10 mg for 10 doses to bring on menses. Rx Clomid, 100 mg days 5-9 post menses onset. Advised intercourse between days 10 and 14. RTO day 28-32 for UPT and followup exam.    Instructions given.

## 2021-05-24 ENCOUNTER — OFFICE VISIT (OUTPATIENT)
Dept: OBGYN CLINIC | Age: 31
End: 2021-05-24
Payer: MEDICAID

## 2021-05-24 ENCOUNTER — PATIENT MESSAGE (OUTPATIENT)
Dept: OBGYN CLINIC | Age: 31
End: 2021-05-24

## 2021-05-24 VITALS
BODY MASS INDEX: 27.6 KG/M2 | DIASTOLIC BLOOD PRESSURE: 60 MMHG | WEIGHT: 150 LBS | HEIGHT: 62 IN | SYSTOLIC BLOOD PRESSURE: 108 MMHG

## 2021-05-24 DIAGNOSIS — N97.0 ABSENCE OF OVULATION: Primary | ICD-10-CM

## 2021-05-24 PROCEDURE — 99213 OFFICE O/P EST LOW 20 MIN: CPT | Performed by: OBSTETRICS & GYNECOLOGY

## 2021-05-24 RX ORDER — MEDROXYPROGESTERONE ACETATE 10 MG/1
10 TABLET ORAL DAILY
Qty: 10 TABLET | Refills: 0 | Status: SHIPPED | OUTPATIENT
Start: 2021-05-24

## 2021-05-24 RX ORDER — METRONIDAZOLE 500 MG/1
TABLET ORAL
COMMUNITY
Start: 2021-05-20 | End: 2021-11-03 | Stop reason: SDUPTHER

## 2021-05-24 RX ORDER — CLOMIPHENE CITRATE 50 MG/1
100 TABLET ORAL DAILY
Qty: 10 TABLET | Refills: 0 | Status: SHIPPED | OUTPATIENT
Start: 2021-05-24 | End: 2021-05-29

## 2021-05-24 RX ORDER — NITROFURANTOIN 25; 75 MG/1; MG/1
CAPSULE ORAL
COMMUNITY
Start: 2021-05-20 | End: 2021-11-10 | Stop reason: ALTCHOICE

## 2021-06-21 ENCOUNTER — OFFICE VISIT (OUTPATIENT)
Dept: OBGYN CLINIC | Age: 31
End: 2021-06-21
Payer: MEDICAID

## 2021-06-21 DIAGNOSIS — N97.0 ABSENCE OF OVULATION: Primary | ICD-10-CM

## 2021-06-21 LAB
HCG URINE, QL. (POC): NEGATIVE
VALID INTERNAL CONTROL?: YES

## 2021-06-21 PROCEDURE — 99213 OFFICE O/P EST LOW 20 MIN: CPT | Performed by: OBSTETRICS & GYNECOLOGY

## 2021-06-21 PROCEDURE — 81025 URINE PREGNANCY TEST: CPT | Performed by: OBSTETRICS & GYNECOLOGY

## 2021-06-21 NOTE — PROGRESS NOTES
Clomid followup note  Radha Hernandez is a ,  32 y.o. female BLACK/ Patient's last menstrual period was 2021 (exact date). She recently went to the Er and was told she has a left ovarian cyst which was causing pelvic pain and nausea. She recently was treated for a UTI and cellulitis. who presents with had concerns including Follow-up. She comes to the office today with a history of oligo-ovulation. She and her partner have been attempting pregnancy for one year. She is here to followup on clomid therapy. The patient has a history of oligo-ovulation which is her indication for clomid therapy. She is now on day 28 of this cycle and did show signs of ovulation. She has taken Clomid, 100 mg's on day 5 of the cycle for 5 days. The couple have had intercourse 2 to 4 times per week . She has had previous Clomid therapy in the past: 50 mg per day on days 5-9. The patient had no symptoms of ovulation on that dose and returns for followup. The patient has been re-advised of the risks, benefits, and alternatives of Clomid therapy.     Past Medical History:   Diagnosis Date    Chlamydia 2019    per pt    Diabetes mellitus type 1 ([address] 2012    IDDM    Hypercholesterolemia     Migraine     Mild depression ([address] 2012    Pap smear abnormality of cervix/human papillomavirus (HPV) positive 2020, 2011    Postpartum depression 2012    Preeclampsia     Thrombocytosis ([facility] 3/1/2019    Trichomonas infection 2020    Vitamin D deficiency      Past Surgical History:   Procedure Laterality Date    HX  SECTION  ;2016    x2    [place]  DELIVERY ONLY      c/s for LGA      Social History     Occupational History    Not on file   Tobacco Use    Smoking status: Never Smoker    Smokeless tobacco: Never Used   Substance and Sexual Activity    Alcohol use: No    Drug use: No    Sexual activity: Yes     Partners: Male     Birth control/protection: None     Family History   Problem Relation Age of Onset    Diabetes Mother         Type II    Hypertension Mother     Diabetes Sister 25        Type II    Hypertension Sister        No Known Allergies  Prior to Admission medications    Medication Sig Start Date End Date Taking? Authorizing Provider   nitrofurantoin, macrocrystal-monohydrate, (MACROBID) 100 mg capsule  5/20/21   Provider, Historical   metroNIDAZOLE (FLAGYL) 500 mg tablet  5/20/21   Provider, Historical   medroxyPROGESTERone (PROVERA) 10 mg tablet Take 1 Tablet by mouth daily. 5/24/21   Cristofer Espinosa MD   insulin aspart U-100 (NovoLOG Flexpen U-100 Insulin) 100 unit/mL (3 mL) inpn **NOT COVERED/PA NEEDED**INJECT 30 TO 35 UNITS THREE TIMES A DAY, BEFORE MEALS--same dose/frequency as with prior pcp Dr. Marcial Spears 2/9/21   Zan Koyanagi, MD   metroNIDAZOLE (METROGEL) 0.75 % gel INSERT 5GM INTO VAGINA DAILY FOR 5 DAYS 11/2/20   Tyler Ayon MD   Lantus Solostar U-100 Insulin 100 unit/mL (3 mL) inpn INJECT 25 UNITS UNDER THE SKIN IN THE EVENING 4/1/20   Madiha Medina MD   acetaminophen (TYLENOL) 500 mg tablet Take 2 Tabs by mouth every six (6) hours as needed for Pain. Patient not taking: Reported on 5/24/2021 2/12/20   Carmen Cedar CORTNEY, NP   lidocaine (LIDODERM) 5 % Apply up to 3 patches to the affected area for 12 hours a day and remove for 12 hours a day.  2/12/20   Ransherley Cedar CORTNEY, NP   BD INSULIN SYRINGE ULTRA-FINE 0.3 mL 31 gauge x 5/16\" syrg USE 3 TIMES A DAY 11/8/19   Madiha Medina MD   omeprazole (PRILOSEC) 40 mg capsule TAKE 1 CAPSULE BY MOUTH EVERY DAY 1/9/19   Provider, Historical   glucose blood VI test strips (ONETOUCH ULTRA TEST) strip Test blood glucose 4 daily Dx Code: E11.65 11/17/15   Sancho Dupont MD   Blood-Glucose Meter [facility] ULTRAMINI) monitoring kit Test blood glucose 4 times daily Dx Code: E11.65 11/17/15   Sancho Dupont MD   Lancets misc Test blood glucose 4 times daily Dx Code: E11.65 11/17/15   Monika Sales MD        Review of Systems: History obtained from the patient  Constitutional: negative for weight loss, fever, night sweats  Breast: negative for breast lumps, nipple discharge, galactorrhea  GI: negative for change in bowel habits, abdominal pain, black or bloody stools  : negative for frequency, dysuria, hematuria, vaginal discharge  MSK: negative for back pain, joint pain, muscle pain  Skin: negative for itching, rash, hives  Psych: negative for anxiety, depression, change in mood    Objective:    Visit Vitals  LMP 05/24/2021 (Exact Date)       Physical Exam:     Constitutional  · Appearance: well-nourished, well developed, alert, in no acute distress    HENT  · Head and Face: appears normal    Neck  · Inspection/Palpation: normal appearance, no masses or tenderness  · Lymph Nodes: no lymphadenopathy present  · Thyroid: gland size normal, nontender, no nodules or masses present on palpation    Genitourinary  · External Genitalia: normal appearance for age, no discharge present, no tenderness present, no inflammatory lesions present, no masses present, no atrophy present  · Vagina: normal vaginal vault without central or paravaginal defects, moderate discharge present, no inflammatory lesions present, no masses present  · Urethra: appears normal  · Cervix: normal with essentially NO cervical mucus   · Perineum: perineum within normal limits, no evidence of trauma, no rashes or skin lesions present  · Anus: anus within normal limits, no hemorrhoids present  · Inguinal Lymph Nodes: no lymphadenopathy present    Skin  · General Inspection: no rash, no lesions identified    Neurologic/Psychiatric  · Mental Status:  · Orientation: grossly oriented to person, place and time  · Mood and Affect: mood normal, affect appropriate    Assessment:  Probably ovulatory on clomid 100. Plan:   Wait for onset of menses.   Recheck UPT day 32-28 and call prn  Call for refill of Clomid if/when menses starts. Instructions given.
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
## 2021-09-15 ENCOUNTER — HOSPITAL ENCOUNTER (EMERGENCY)
Age: 31
Discharge: HOME | End: 2021-09-15
Attending: EMERGENCY MEDICINE
Payer: MEDICAID

## 2021-09-15 VITALS
DIASTOLIC BLOOD PRESSURE: 57 MMHG | HEIGHT: 62 IN | RESPIRATION RATE: 16 BRPM | HEART RATE: 75 BPM | SYSTOLIC BLOOD PRESSURE: 96 MMHG | OXYGEN SATURATION: 98 % | WEIGHT: 150 LBS | BODY MASS INDEX: 27.6 KG/M2 | TEMPERATURE: 98.4 F

## 2021-09-15 DIAGNOSIS — Z32.02 NEGATIVE PREGNANCY TEST: Primary | ICD-10-CM

## 2021-09-15 LAB
APPEARANCE UR: CLEAR
BACTERIA URNS QL MICRO: NEGATIVE /HPF
BILIRUB UR QL: NEGATIVE
COLOR UR: ABNORMAL
COMMENT, HOLDF: NORMAL
EPITH CASTS URNS QL MICRO: ABNORMAL /LPF
GLUCOSE UR STRIP.AUTO-MCNC: >1000 MG/DL
HCG SERPL-ACNC: <1 MIU/ML (ref 0–6)
HCG UR QL: NEGATIVE
HGB UR QL STRIP: NEGATIVE
HYALINE CASTS URNS QL MICRO: ABNORMAL /LPF (ref 0–5)
KETONES UR QL STRIP.AUTO: NEGATIVE MG/DL
LEUKOCYTE ESTERASE UR QL STRIP.AUTO: ABNORMAL
NITRITE UR QL STRIP.AUTO: NEGATIVE
PH UR STRIP: 6 [PH] (ref 5–8)
PROT UR STRIP-MCNC: NEGATIVE MG/DL
RBC #/AREA URNS HPF: ABNORMAL /HPF (ref 0–5)
SAMPLES BEING HELD,HOLD: NORMAL
SP GR UR REFRACTOMETRY: 1.02 (ref 1–1.03)
UROBILINOGEN UR QL STRIP.AUTO: 0.2 EU/DL (ref 0.2–1)
WBC URNS QL MICRO: ABNORMAL /HPF (ref 0–4)

## 2021-09-15 PROCEDURE — 81001 URINALYSIS AUTO W/SCOPE: CPT

## 2021-09-15 PROCEDURE — 84702 CHORIONIC GONADOTROPIN TEST: CPT

## 2021-09-15 PROCEDURE — 81025 URINE PREGNANCY TEST: CPT

## 2021-09-15 PROCEDURE — 99282 EMERGENCY DEPT VISIT SF MDM: CPT

## 2021-09-15 PROCEDURE — 36415 COLL VENOUS BLD VENIPUNCTURE: CPT

## 2021-09-15 NOTE — ED PROVIDER NOTES
This is a 29-year-old female who presents ambulatory to the emergency room with complaints of a missed period. States she is on Clomid. Took a home pregnancy test as she is on day [de-identified] of no menses. The at-home pregnancy test was negative so she decided to come to the emergency room for another urine test and possibly blood work. Patient denies any chest pain, shortness of breath, dizziness, nausea or vomiting, fever or chills. Positive abdominal cramping without vaginal bleeding or discharge. States the cramping feels like she is getting ready to start but nothing will come out. Has not taken any medication prior to arrival for the abdominal cramping. Patient appears anxious, states she has anxiety at baseline. There are no further complaints at this time. Anaya Francisco MD  Past Medical History:  2019: Chlamydia      Comment:  per pt  2012: Diabetes mellitus type 1 ([address]      Comment:  IDDM  No date: Hypercholesterolemia  No date: Migraine  2012: Mild depression ([address]  2020, 2011: Pap smear abnormality of cervix/human   papillomavirus (HPV) positive  2012: Postpartum depression  No date: Preeclampsia  3/1/2019:  Thrombocytosis ([address]  2020: Trichomonas infection  No date: Vitamin D deficiency  Past Surgical History:  ;2016: HX  SECTION      Comment:  x2  : [facility]      Comment:  c/s for LGA                Past Medical History:   Diagnosis Date    Chlamydia 2019    per pt    Diabetes mellitus type 1 ([address] 2012    IDDM    Hypercholesterolemia     Migraine     Mild depression ([address] 2012    Pap smear abnormality of cervix/human papillomavirus (HPV) positive 2020, 2011    Postpartum depression 2012    Preeclampsia     Thrombocytosis ([address] 3/1/2019    Trichomonas infection 2020    Vitamin D deficiency        Past Surgical History:   Procedure Laterality Date    HX  SECTION  2011;2016    x2    [place]  DELIVERY ONLY      c/s for LGA          Family History:   Problem Relation Age of Onset    Diabetes Mother         Type II    Hypertension Mother     Diabetes Sister 25        Type II    Hypertension Sister        Social History     Socioeconomic History    Marital status: SINGLE     Spouse name: Not on file    Number of children: Not on file    Years of education: Not on file    Highest education level: Not on file   Occupational History    Not on file   Tobacco Use    Smoking status: Never Smoker    Smokeless tobacco: Never Used   Substance and Sexual Activity    Alcohol use: No    Drug use: No    Sexual activity: Yes     Partners: Male     Birth control/protection: None   Other Topics Concern    Not on file   Social History Narrative    Not on file     Social Determinants of Health     Financial Resource Strain:     Difficulty of Paying Living Expenses:    Food Insecurity:     Worried About Running Out of Food in the Last Year:     Ran Out of Food in the Last Year:    Transportation Needs:     Lack of Transportation (Medical):  Lack of Transportation (Non-Medical):    Physical Activity:     Days of Exercise per Week:     Minutes of Exercise per Session:    Stress:     Feeling of Stress :    Social Connections:     Frequency of Communication with Friends and Family:     Frequency of Social Gatherings with Friends and Family:     Attends [employer] Services:     Active Member of Clubs or Organizations:     Attends Club or Organization Meetings:     Marital Status:    Intimate Partner Violence:     Fear of Current or Ex-Partner:     Emotionally Abused:     Physically Abused:     Sexually Abused: ALLERGIES: Patient has no known allergies. Review of Systems   Constitutional: Negative for appetite change, chills, diaphoresis, fatigue and fever. HENT: Negative for congestion, sore throat and trouble swallowing.     Eyes: Negative for redness. Respiratory: Negative for shortness of breath and wheezing. Cardiovascular: Negative for chest pain and palpitations. Gastrointestinal: Positive for abdominal pain (\"cramping\"). Negative for abdominal distention, nausea and vomiting. Endocrine: Negative. Genitourinary: Negative for difficulty urinating, flank pain, frequency, urgency, vaginal bleeding, vaginal discharge and vaginal pain. Musculoskeletal: Negative for back pain, neck pain and neck stiffness. Skin: Negative for color change, pallor, rash and wound. Allergic/Immunologic: Negative. Neurological: Negative for dizziness, speech difficulty, weakness and headaches. Hematological: Does not bruise/bleed easily. Psychiatric/Behavioral: Negative for behavioral problems. The patient is nervous/anxious. Vitals:    09/15/21 1624   BP: (!) 96/57   Pulse: 75   Resp: 16   Temp: 98.4 °F (36.9 °C)   SpO2: 98%   Weight: 68 kg (150 lb)   Height: 5' 2\" (1.575 m)            Physical Exam  Vitals and nursing note reviewed. Constitutional:       General: She is not in acute distress. Appearance: Normal appearance. She is well-developed. She is not ill-appearing. HENT:      Head: Normocephalic and atraumatic. Right Ear: External ear normal.      Left Ear: External ear normal.      Nose: Nose normal.      Mouth/Throat:      Mouth: Mucous membranes are moist.   Eyes:      General:         Right eye: No discharge. Left eye: No discharge. Conjunctiva/sclera: Conjunctivae normal.      Pupils: Pupils are equal, round, and reactive to light. Neck:      Vascular: No JVD. Trachea: No tracheal deviation. Cardiovascular:      Rate and Rhythm: Normal rate and regular rhythm. Pulses: Normal pulses. Heart sounds: Normal heart sounds. No murmur heard. No gallop. Pulmonary:      Effort: Pulmonary effort is normal. No respiratory distress. Breath sounds: Normal breath sounds.  No wheezing or rales. Chest:      Chest wall: No tenderness. Abdominal:      General: Bowel sounds are normal. There is no distension. Palpations: Abdomen is soft. Tenderness: There is abdominal tenderness (\"cramping\"). There is no guarding or rebound. Genitourinary:     Comments: Negative    Musculoskeletal:         General: No tenderness. Normal range of motion. Cervical back: Normal range of motion and neck supple. Skin:     General: Skin is warm and dry. Capillary Refill: Capillary refill takes less than 2 seconds. Coloration: Skin is not pale. Findings: No erythema or rash. Neurological:      General: No focal deficit present. Mental Status: She is alert and oriented to person, place, and time. Motor: No weakness. Coordination: Coordination normal.   Psychiatric:         Behavior: Behavior normal.         Thought Content: Thought content normal.         Judgment: Judgment normal.      Comments: Anxious appearing female            MDM  Number of Diagnoses or Management Options  Negative pregnancy test: new and requires workup  Diagnosis management comments: Differential diagnosis includes pregnancy, urinary tract infection and others. After physical assessment and review of laboratory data, patient was discharged home with a negative pregnancy test.  Follow-up with OB/GYN as an outpatient. Patient in agreement with plan of care and will return with worsening symptoms. Amount and/or Complexity of Data Reviewed  Clinical lab tests: ordered and reviewed         Labs Reviewed   URINALYSIS W/MICROSCOPIC - Abnormal; Notable for the following components:       Result Value    Glucose >1,000 (*)     Leukocyte Esterase SMALL (*)     All other components within normal limits   BETA HCG, QT   HCG URINE, QL. - POC     No results found. 6:13 PM  Pt has been reexamined. Pt has no new complaints, changes or physical findings.  Care plan outlined and precautions discussed. All available results were reviewed with pt. All medications were reviewed with pt. All of pt's questions and concerns were addressed. Pt agrees to F/U as instructed and agrees to return to ED upon further deterioration. Pt is ready to go home. Tai Perez NP    Please note that this dictation was completed with [employer], the computer voice recognition software. Quite often unanticipated grammatical, syntax, homophones, and other interpretive errors are inadvertently transcribed by the computer software. Please disregard these errors. Please excuse any errors that have escaped final proofreading. Thank you.     Procedures

## 2021-09-15 NOTE — ED TRIAGE NOTES
Pt reports she is on clomid and wants to be checked for pregnancy.  States she is 45 days late and tested negative 2 days ago but wants a blood test.

## 2021-09-24 ENCOUNTER — TELEPHONE (OUTPATIENT)
Dept: OBGYN CLINIC | Age: 31
End: 2021-09-24

## 2021-09-24 NOTE — TELEPHONE ENCOUNTER
Pt calling to see if she can come see HW. Pt states she has been on CLOMID x5 months and is having longer cycles, in which today she is feeling cramping like she is on her period. Pt wanted to be evaluated to see if there is anything else going on.     ?OV  ?[place]    Please advise

## 2021-09-28 NOTE — PROGRESS NOTES
Clomid followup note  Tomeka Chen is a ,  32 y.o. female BLACK/ Patient's last menstrual period was 2021 (exact date). who presents with had concerns including Missed Menses. She went to the ER on 9/15/21 for a pregnancy test.  She states her sister passed away at the beginning of the month. She has had a period for the last 3 months except for September. Thinks she ovulated this month on  also but no period yet. Cycles have been 36-37 days on Clomid. So is only 6 days post ovulation. She comes to the office today with a history of oligo-ovulation. She and her partner have been attempting pregnancy for one year. She is here to followup on clomid therapy. The patient has a history of oligo-ovulation which is her indication for clomid therapy. She is now on day 54 of this cycle and did show signs of ovulation. She has taken Clomid, 50 mg's on day 5 of the cycle for 5 days. The couple have had intercourse 2 to 4 times per week . She has had previous Clomid therapy in the past: 50 mg per day on days 5-9. The patient had no symptoms of ovulation on that dose and returns for followup. The patient has been re-advised of the risks, benefits, and alternatives of Clomid therapy.     Past Medical History:   Diagnosis Date    Chlamydia 2019    per pt    Diabetes mellitus type 1 ([address] 2012    IDDM    Hypercholesterolemia     Migraine     Mild depression ([facility] 2012    Pap smear abnormality of cervix/human papillomavirus (HPV) positive 2020, 2011    Postpartum depression 2012    Preeclampsia     Thrombocytosis ([address] 3/1/2019    Trichomonas infection 2020    Vitamin D deficiency      Past Surgical History:   Procedure Laterality Date    HX  SECTION  ;2016    x2    [place]  DELIVERY ONLY      c/s for LGA      Social History     Occupational History    Not on file   Tobacco Use    Smoking status: Never Smoker  Smokeless tobacco: Never Used   Substance and Sexual Activity    Alcohol use: No    Drug use: No    Sexual activity: Yes     Partners: Male     Birth control/protection: None     Family History   Problem Relation Age of Onset    Diabetes Mother         Type II    Hypertension Mother     Diabetes Sister 25        Type II    Hypertension Sister        No Known Allergies  Prior to Admission medications    Medication Sig Start Date End Date Taking? Authorizing Provider   insulin glargine (Lantus Solostar U-100 Insulin) 100 unit/mL (3 mL) inpn INJECT 25 UNITS UNDER THE SKIN IN THE EVENING 8/9/21   Leslye Espinal PA   clomiPHENE (CLOMID) 50 mg tablet Take two tablets days 5-9 after menses start 7/1/21   Maya Valdez MD   Insulin Syringe-Needle U-100 (BD Insulin Syringe Ultra-Fine) 0.3 mL 31 gauge x 5/16\" syrg USE 3 TIMES A DAY 6/28/21   Leslye Espinal PA   nitrofurantoin, macrocrystal-monohydrate, (MACROBID) 100 mg capsule  5/20/21   Provider, Historical   metroNIDAZOLE (FLAGYL) 500 mg tablet  5/20/21   Provider, Historical   medroxyPROGESTERone (PROVERA) 10 mg tablet Take 1 Tablet by mouth daily. 5/24/21   Jolanta Bobby MD   insulin aspart U-100 (NovoLOG Flexpen U-100 Insulin) 100 unit/mL (3 mL) inpn **NOT COVERED/PA NEEDED**INJECT 30 TO 35 UNITS THREE TIMES A DAY, BEFORE MEALS--same dose/frequency as with prior pcp Dr. Ronaldo White 2/9/21   Cyndee Hartley MD   metroNIDAZOLE (METROGEL) 0.75 % gel INSERT 5GM INTO VAGINA DAILY FOR 5 DAYS 11/2/20   Maya Valdez MD   acetaminophen (TYLENOL) 500 mg tablet Take 2 Tabs by mouth every six (6) hours as needed for Pain. Patient not taking: Reported on 5/24/2021 2/12/20   Deborrah Primus D, NP   lidocaine (LIDODERM) 5 % Apply up to 3 patches to the affected area for 12 hours a day and remove for 12 hours a day.  2/12/20   Deborrah Primus D, NP   omeprazole (PRILOSEC) 40 mg capsule TAKE 1 CAPSULE BY MOUTH EVERY DAY 1/9/19   Provider, Historical glucose blood VI test strips (ONETOUCH ULTRA TEST) strip Test blood glucose 4 daily Dx Code: E11.65 11/17/15   Diomedes Cannon MD   Blood-Glucose Meter (ONETOUCH ULTRAMINI) monitoring kit Test blood glucose 4 times daily Dx Code: E11.65 11/17/15   Diomedes Cannon MD   Lancets misc Test blood glucose 4 times daily Dx Code: E11.65 11/17/15   Diomedes Cannon MD        Review of Systems: History obtained from the patient  Constitutional: negative for weight loss, fever, night sweats  Breast: negative for breast lumps, nipple discharge, galactorrhea  GI: negative for change in bowel habits, abdominal pain, black or bloody stools  : negative for frequency, dysuria, hematuria, vaginal discharge  MSK: negative for back pain, joint pain, muscle pain  Skin: negative for itching, rash, hives  Psych: negative for anxiety, depression, change in mood    Objective:    Visit Vitals  LMP 08/05/2021 (Exact Date)       Physical Exam:     Constitutional  · Appearance: well-nourished, well developed, alert, in no acute distress    HENT  · Head and Face: appears normal    Neck  · Inspection/Palpation: normal appearance, no masses or tenderness  · Lymph Nodes: no lymphadenopathy present  · Thyroid: gland size normal, nontender, no nodules or masses present on palpation    Skin  · General Inspection: no rash, no lesions identified    Neurologic/Psychiatric  · Mental Status:  · Orientation: grossly oriented to person, place and time  · Mood and Affect: mood normal, affect appropriate    Assessment:  Oligoamenorrhea. Clomid appears to be working. Needs to wait 8 or 9 days and then repeat UPT. If no menses or positive UPT, may be stress with sister dying. Plan:   Rx Provera 10 mg for 10 doses to bring on menses if neither menses or positive UPT in 8-9 days. Refill Clomid prn. Instructions given.

## 2021-09-29 ENCOUNTER — OFFICE VISIT (OUTPATIENT)
Dept: OBGYN CLINIC | Age: 31
End: 2021-09-29
Payer: MEDICAID

## 2021-09-29 DIAGNOSIS — N92.6 MISSED MENSES: Primary | ICD-10-CM

## 2021-09-29 LAB
HCG URINE, QL. (POC): NEGATIVE
VALID INTERNAL CONTROL?: YES

## 2021-09-29 PROCEDURE — 81025 URINE PREGNANCY TEST: CPT | Performed by: OBSTETRICS & GYNECOLOGY

## 2021-09-29 PROCEDURE — 99214 OFFICE O/P EST MOD 30 MIN: CPT | Performed by: OBSTETRICS & GYNECOLOGY

## 2021-09-29 NOTE — PATIENT INSTRUCTIONS
Delayed Menstrual Periods: Care Instructions  Your Care Instructions     Some young women start their menstrual periods later than others. They may have pubic hair and breasts but still not have periods. When a woman does not get her period every month as expected or does not get her first period by the time she is 13, it is called amenorrhea. Amenorrhea can happen for many reasons. Sometimes it's caused by a problem with the reproductive organs or another medical problem. Other times it's caused by doing hard exercise for long periods of time. It can also happen if you don't eat well or if you diet too much or have an eating disorder. Pregnancy and certain types of birth control can also delay your periods. Your doctor will want to find out why your period hasn't started. You may need to make some diet and exercise changes. These may help start your period. Or you may need treatment for another problem. Follow-up care is a key part of your treatment and safety. Be sure to make and go to all appointments, and call your doctor if you are having problems. It's also a good idea to know your test results and keep a list of the medicines you take. How can you care for yourself at home? · Eat a healthy, balanced diet. Include fruits, vegetables, whole grains, proteins, and low-fat dairy products. · Stay at a healthy weight. Ask your doctor what a healthy weight is for you. · Get regular exercise. But don't do hard, long exercise. Ask your doctor how much is too much. · If you are embarrassed about not having your period yet, talk to family members about how you are feeling. You can also talk to your doctor or a counselor. When should you call for help? Watch closely for changes in your health, and be sure to contact your doctor if:    · You do not get your period as expected.     · You think you might be pregnant. Where can you learn more?   Go to http://www.gray.com/  Enter A370 in the search box to learn more about \"Delayed Menstrual Periods: Care Instructions. \"  Current as of: February 11, 2021               Content Version: 13.0  © 2006-2021 [employer]wise, [employer]. Care instructions adapted under license by [employer] (which disclaims liability or warranty for this information). If you have questions about a medical condition or this instruction, always ask your healthcare professional. [place] [address] any warranty or liability for your use of this information. no

## 2021-10-04 ENCOUNTER — HOSPITAL ENCOUNTER (EMERGENCY)
Age: 31
Discharge: HOME | End: 2021-10-04
Attending: EMERGENCY MEDICINE
Payer: MEDICAID

## 2021-10-04 VITALS
OXYGEN SATURATION: 97 % | HEART RATE: 87 BPM | DIASTOLIC BLOOD PRESSURE: 74 MMHG | HEIGHT: 62 IN | TEMPERATURE: 97.7 F | SYSTOLIC BLOOD PRESSURE: 120 MMHG | WEIGHT: 150 LBS | RESPIRATION RATE: 16 BRPM | BODY MASS INDEX: 27.6 KG/M2

## 2021-10-04 DIAGNOSIS — R10.84 ABDOMINAL PAIN, GENERALIZED: ICD-10-CM

## 2021-10-04 DIAGNOSIS — R05.8 COUGH WITH EXPOSURE TO COVID-19 VIRUS: Primary | ICD-10-CM

## 2021-10-04 DIAGNOSIS — Z20.822 COUGH WITH EXPOSURE TO COVID-19 VIRUS: Primary | ICD-10-CM

## 2021-10-04 DIAGNOSIS — J02.9 SORE THROAT: ICD-10-CM

## 2021-10-04 LAB — HCG UR QL: NEGATIVE

## 2021-10-04 PROCEDURE — 81025 URINE PREGNANCY TEST: CPT

## 2021-10-04 PROCEDURE — 99282 EMERGENCY DEPT VISIT SF MDM: CPT

## 2021-10-04 NOTE — ED TRIAGE NOTES
Patient reports she started with a sore throat on Saturday as well as generalized malaise. Pt also reports abdominal pain; denies vomiting or diarrhea    Patient denies fevers at home and denies any sick contacts- reports one of her kids may have had a cold    Patient also notes a history of blood bowel movements; reports she has been having bloody stools for the last week.  Pt reports her last BM was this morning and there was blood present-    Pt also reports she has been trying to conceive; reports her LMP as 8/4/21 and reports a negative home pregnancy test 2-3 days ago; doesn't believe she is pregnant

## 2021-10-04 NOTE — DISCHARGE INSTRUCTIONS
Alternate tylenol and ibuprofen as needed for symptom relief. Recommend using an over the counter nasal spray to help with nasal congestion and sore throat. If you continue to have abdominal cramping and diarrhea that does not seem to improve, please return to ER.

## 2021-10-04 NOTE — ED PROVIDER NOTES
Patient is a 35-year-old female with a past medical history of type 1 diabetes, hyperlipidemia, depression, vitamin D deficiency who presents to ED with multiple concerns. Patient reports she started with nasal congestion, sore throat, generalized body aches and fatigue earlier today. Patient reports she also has slight abdominal pain and the sensation that she needs to have a bowel movement. Patient states similar symptoms of abdominal pain when she had diarrhea in the past.  Patient reports her children initially started with the symptoms and she thought it was just a cold but she is unsure if it could be COVID-19. Patient reports she had COVID-19 in January of this year, but she is not vaccinated. She denies any fever, chills, cough, shortness of breath, difficulty breathing, nausea, vomiting, diarrhea, constipation, urinary frequency and syncope. Patient reports her last menstrual cycle was 8. She is trying to conceive, and has been taking home pregnancy test which were negative but states she is still concerned she may be pregnant.            Past Medical History:   Diagnosis Date    Chlamydia 2019    per pt    Diabetes mellitus type 1 ([facility] 2012    IDDM    Hypercholesterolemia     Migraine     Mild depression ([facility] 2012    Pap smear abnormality of cervix/human papillomavirus (HPV) positive 2020, 2011    Postpartum depression 2012    Preeclampsia     Thrombocytosis ([facility] 3/1/2019    Trichomonas infection 2020    Vitamin D deficiency        Past Surgical History:   Procedure Laterality Date    HX  SECTION  ;2016    x2    [place]  DELIVERY ONLY      c/s for LGA          Family History:   Problem Relation Age of Onset    Diabetes Mother         Type II    Hypertension Mother     Diabetes Sister 25        Type II    Hypertension Sister        Social History     Socioeconomic History    Marital status: SINGLE     Spouse name: Not on file    Number of children: Not on file    Years of education: Not on file    Highest education level: Not on file   Occupational History    Not on file   Tobacco Use    Smoking status: Never Smoker    Smokeless tobacco: Never Used   Substance and Sexual Activity    Alcohol use: No    Drug use: No    Sexual activity: Yes     Partners: Male     Birth control/protection: None   Other Topics Concern    Not on file   Social History Narrative    Not on file     Social Determinants of Health     Financial Resource Strain:     Difficulty of Paying Living Expenses:    Food Insecurity:     Worried About Running Out of Food in the Last Year:     [address] in the Last Year:    Transportation Needs:     Lack of Transportation (Medical):  Lack of Transportation (Non-Medical):    Physical Activity:     Days of Exercise per Week:     Minutes of Exercise per Session:    Stress:     Feeling of Stress :    Social Connections:     Frequency of Communication with Friends and Family:     Frequency of Social Gatherings with Friends and Family:     Attends [employer] Services:     Active Member of Clubs or Organizations:     Attends Club or Organization Meetings:     Marital Status:    Intimate Partner Violence:     Fear of Current or Ex-Partner:     Emotionally Abused:     Physically Abused:     Sexually Abused: ALLERGIES: Patient has no known allergies. Review of Systems   Constitutional: Positive for fatigue. Negative for activity change, appetite change, chills and fever. HENT: Positive for congestion and sore throat. Negative for ear pain, rhinorrhea and trouble swallowing. Eyes: Negative for pain and visual disturbance. Respiratory: Negative for cough and shortness of breath. Cardiovascular: Negative for chest pain, palpitations and leg swelling. Gastrointestinal: Positive for abdominal pain. Negative for abdominal distention, constipation, diarrhea, nausea and vomiting. Genitourinary: Negative for decreased urine volume, dysuria, flank pain, frequency, urgency, vaginal bleeding and vaginal discharge. Musculoskeletal: Positive for myalgias. Negative for back pain and neck pain. Skin: Negative for rash and wound. Allergic/Immunologic: Negative for immunocompromised state. Neurological: Negative for dizziness, syncope, weakness, light-headedness, numbness and headaches. Psychiatric/Behavioral: Negative for confusion. All other systems reviewed and are negative. Vitals:    10/04/21 1654   BP: 120/74   Pulse: 87   Resp: 16   Temp: 97.7 °F (36.5 °C)   SpO2: 97%   Weight: 68 kg (150 lb)   Height: 5' 2\" (1.575 m)            Physical Exam  Vitals and nursing note reviewed. Constitutional:       General: She is not in acute distress. Appearance: Normal appearance. She is well-developed. She is not toxic-appearing. HENT:      Head: Normocephalic and atraumatic. Right Ear: Tympanic membrane and ear canal normal.      Left Ear: Tympanic membrane and ear canal normal.      Nose: Nose normal.      Mouth/Throat:      Mouth: Mucous membranes are moist.      Pharynx: Oropharynx is clear. Uvula midline. No pharyngeal swelling, oropharyngeal exudate, posterior oropharyngeal erythema or uvula swelling. Tonsils: No tonsillar exudate or tonsillar abscesses. Eyes:      General: Lids are normal.      Extraocular Movements: Extraocular movements intact. Conjunctiva/sclera: Conjunctivae normal.   Cardiovascular:      Rate and Rhythm: Normal rate and regular rhythm. Pulses: Normal pulses. Heart sounds: Normal heart sounds, S1 normal and S2 normal.   Pulmonary:      Effort: Pulmonary effort is normal. No accessory muscle usage. Breath sounds: Normal breath sounds. Abdominal:      Palpations: Abdomen is soft. Tenderness: There is no abdominal tenderness. Musculoskeletal:         General: Normal range of motion.       Cervical back: Normal range of motion and neck supple. Skin:     General: Skin is warm and dry. Capillary Refill: Capillary refill takes less than 2 seconds. Neurological:      General: No focal deficit present. Mental Status: She is alert and oriented to person, place, and time. Mental status is at baseline. Psychiatric:         Attention and Perception: Attention normal.         Mood and Affect: Mood and affect normal.         Speech: Speech normal.         Behavior: Behavior is cooperative. Thought Content: Thought content normal.         Cognition and Memory: Cognition normal.         Judgment: Judgment normal.          MDM  Number of Diagnoses or Management Options  Abdominal pain, generalized  Cough with exposure to COVID-19 virus  Sore throat  Diagnosis management comments: Patient presented with multiple complaints. Urine pregnancy test performed and negative. Patient also with nasal congestion, sore throat, myalgias, fatigue, and abdominal pain. Abdomen is soft and nontender on exam. VSS. Will test for COVID-19 given her children are also sick. Advised symptomatic care and return to ER precautions provided to patient. Patient understands and agrees with plan. All questions addressed and answered.         Amount and/or Complexity of Data Reviewed  Review and summarize past medical records: yes  Discuss the patient with other providers: yes (Dr. Willis Doss, ED attending )           Procedures

## 2021-10-05 ENCOUNTER — PATIENT OUTREACH (OUTPATIENT)
Dept: CASE MANAGEMENT | Age: 31
End: 2021-10-05

## 2021-10-05 NOTE — PROGRESS NOTES
10/05/21   Attempted to complete pt outreach, left vm. Spoke with pts mother, asked her to have pt contact this ACM. 1420- Attempted to make contact with pt, no answer.

## 2021-10-06 ENCOUNTER — PATIENT OUTREACH (OUTPATIENT)
Dept: CASE MANAGEMENT | Age: 31
End: 2021-10-06

## 2021-10-06 NOTE — PROGRESS NOTES
Patient contacted regarding COVID-19 risk. Discussed COVID-19 related testing which was she said she was tested & it was negative at this time. Test results were negative. Patient informed of results, if available? Tested negative per pt. Ambulatory Care Manager contacted the patient by telephone to perform post discharge assessment. Call within 2 business days of discharge: Yes Verified name and  with patient as identifiers. Provided introduction to self, and explanation of the CTN/ACM role, and reason for call due to risk factors for infection and/or exposure to COVID-19. Symptoms reviewed with patient who verbalized the following symptoms: sore throat      Due to no new or worsening symptoms encounter was not routed to provider for escalation. Discussed follow-up appointments. If no appointment was previously scheduled, appointment scheduling offered:  yes. [facility] follow up appointment(s): No future appointments. Non-[facility] follow up appointment(s): no    Interventions to address risk factors: Obtained and reviewed discharge summary and/or continuity of care documents     Advance Care Planning:   Does patient have an Advance Directive: not on file. Educated patient about risk for severe COVID-19 due to risk factors according to CDC guidelines. ACM reviewed discharge instructions, medical action plan and red flag symptoms with the patient who verbalized understanding. Discussed COVID vaccination status: no. Education provided on COVID-19 vaccination as appropriate. Discussed exposure protocols and quarantine with CDC Guidelines. Patient was given an opportunity to verbalize any questions and concerns and agrees to contact ACM or health care provider for questions related to their healthcare. Reviewed and educated patient on any new and changed medications related to discharge diagnosis     Was patient discharged with a pulse oximeter? no     ACM provided contact information.  Plan for follow-up call in 5-7 days based on severity of symptoms and risk factors. Pt states she is feeling better, just a bit of a sore throat.

## 2021-10-06 NOTE — PROGRESS NOTES
10/06/21  Attempted to outreach with pt today, left vm. Left contact info for pt to reach out to this ACM if needed.

## 2021-10-13 ENCOUNTER — PATIENT OUTREACH (OUTPATIENT)
Dept: CASE MANAGEMENT | Age: 31
End: 2021-10-13

## 2021-10-13 NOTE — PROGRESS NOTES
Patient resolved from [address] Transitions episode on 10/13/21. Discussed COVID-19 related testing which was not done at this time. Test results were not done. Patient informed of results, if available? Not done     Patient/family has been provided the following resources and education related to COVID-19:                         Signs, symptoms and red flags related to COVID-19            CDC exposure and quarantine guidelines            [facility] exposure contact - [phone]            Contact for their local Department of Health                 Patient currently reports that the following symptoms have improved:  fatigue and abd pain, sore throat. .    No further outreach scheduled with this CTN/ACM/LPN/HC/ MA. Episode of Care resolved. Patient has this CTN/ACM/LPN/HC/MA contact information if future needs arise.

## 2021-10-25 ENCOUNTER — TELEPHONE (OUTPATIENT)
Dept: OBGYN CLINIC | Age: 31
End: 2021-10-25

## 2021-10-25 NOTE — TELEPHONE ENCOUNTER
Pt calling with c/o heavy VB and extreme cramping. Pt states she is on Day 4 of her menses after taking the provera and she is having to change a pad every hour, has a few dime sized clots when she goes to the bathroom, and soaked through her pad and pants today at work. Pt wanted to know if this was normal after taking the provera? Pt denies any lightheadedness, weakness, or dizziness.

## 2021-10-25 NOTE — TELEPHONE ENCOUNTER
This RN advised pt of MD recommendations. This RN scheduled pt for 11/3 US @1000 and OV @1040. Pt verbalized understanding.

## 2021-11-01 NOTE — PROGRESS NOTES
Abnormal bleeding note      Alena Anaya is a 32 y.o. female who complains of vaginal bleeding problems. Her current method of family planning is none. Took 50 mg Clomid starting day 7 (10/28/21)     She has had vaginal bleeding which she describes as heavy w/ clots lasting from 10/21/21-10/29/21  Her last period prior to this one was in August.    Pad or tampon count: changes every 1 hour      Associated symptoms include pelvic pain. Alleviating factors: none    Aggravating factors: none      The patient is sexually active. Last Pap smear:was abnormal: HPV positive. Ultrasound today showed:  UTERUS IS ANTEVERTED, NORMAL IN SIZE AND HETEROGENEOUS IN ECHOGENICITY. ENDOMETRIUM MEASURES 4-5MM IN THICKNESS. NO EVIDENCE OF MASSES OR ABNORMALITIES ARE SEEN. RIGHT AND LEFT OVARIES APPEAR TO HAVE MULTIPLE PERIPHERY ARRANGED FOLLICLES < 1CM. FREE FLUID SEEN IN THE CDS.     Her relevant past medical history:   Past Medical History:   Diagnosis Date    Chlamydia 2019    per pt    Diabetes mellitus type 1 ([facility] [address] 2012    IDDM    Hypercholesterolemia     Migraine     Mild depression ([facility] [address] 2012    Pap smear abnormality of cervix/human papillomavirus (HPV) positive 2020, 2011    Postpartum depression 2012    Preeclampsia     Thrombocytosis 3/1/2019    Trichomonas infection 2020    Vitamin D deficiency         Past Surgical History:   Procedure Laterality Date    HX  SECTION  ;2016    x2    [place]  DELIVERY ONLY      c/s for LGA      Social History     Occupational History    Not on file   Tobacco Use    Smoking status: Never Smoker    Smokeless tobacco: Never Used   Substance and Sexual Activity    Alcohol use: No    Drug use: No    Sexual activity: Yes     Partners: Male     Birth control/protection: None     Family History   Problem Relation Age of Onset    Diabetes Mother         Type II    Hypertension Mother     Diabetes Sister 25 Type II    Hypertension Sister        No Known Allergies  Prior to Admission medications    Medication Sig Start Date End Date Taking? Authorizing Provider   insulin glargine (Lantus Solostar U-100 Insulin) 100 unit/mL (3 mL) inpn INJECT 25 UNITS UNDER THE SKIN IN THE EVENING 8/9/21   Immanuel Espinal PA   clomiPHENE (CLOMID) 50 mg tablet Take two tablets days 5-9 after menses start 7/1/21   Avni Hoskins MD   Insulin Syringe-Needle U-100 (BD Insulin Syringe Ultra-Fine) 0.3 mL 31 gauge x 5/16\" syrg USE 3 TIMES A DAY 6/28/21   Immanuel Espinal PA   nitrofurantoin, macrocrystal-monohydrate, (MACROBID) 100 mg capsule  5/20/21   Provider, Historical   metroNIDAZOLE (FLAGYL) 500 mg tablet  5/20/21   Provider, Historical   medroxyPROGESTERone (PROVERA) 10 mg tablet Take 1 Tablet by mouth daily. 5/24/21   Deven Del Rosario MD   insulin aspart U-100 (NovoLOG Flexpen U-100 Insulin) 100 unit/mL (3 mL) inpn **NOT COVERED/PA NEEDED**INJECT 30 TO 35 UNITS THREE TIMES A DAY, BEFORE MEALS--same dose/frequency as with prior pcp Dr. Mike Navarro 2/9/21   Minna White MD   metroNIDAZOLE (METROGEL) 0.75 % gel INSERT 5GM INTO VAGINA DAILY FOR 5 DAYS 11/2/20   Avni Hoskins MD   acetaminophen (TYLENOL) 500 mg tablet Take 2 Tabs by mouth every six (6) hours as needed for Pain. Patient not taking: Reported on 5/24/2021 2/12/20   Maile BARR NP   lidocaine (LIDODERM) 5 % Apply up to 3 patches to the affected area for 12 hours a day and remove for 12 hours a day.  2/12/20   Maile BARR NP   omeprazole (PRILOSEC) 40 mg capsule TAKE 1 CAPSULE BY MOUTH EVERY DAY 1/9/19   Provider, Historical   glucose blood VI test strips (ONETOUCH ULTRA TEST) strip Test blood glucose 4 daily Dx Code: E11.65 11/17/15   Shahbaz Mullins MD   Blood-Glucose Meter [facility] ULTRAMINI) monitoring kit Test blood glucose 4 times daily Dx Code: E11.65 11/17/15   Shahbaz Mullins MD   Lancets misc Test blood glucose 4 times daily Dx Code: E11.65 11/17/15   Collin Mckeon MD        Review of Systems - History obtained from the patient  Constitutional: negative for weight loss, fever, night sweats  HEENT: negative for hearing loss, earache, congestion, snoring, sorethroat  CV: negative for chest pain, palpitations, edema  Resp: negative for cough, shortness of breath, wheezing  Breast: negative for breast lumps, nipple discharge, galactorrhea  GI: negative for change in bowel habits, abdominal pain, black or bloody stools  : negative for frequency, dysuria, hematuria  MSK: negative for back pain, joint pain, muscle pain  Skin: negative for itching, rash, hives  Neuro: negative for dizziness, headache, confusion, weakness  Psych: negative for anxiety, depression, change in mood  Heme/lymph: negative for bleeding, bruising, pallor      Objective:    Visit Vitals  LMP 10/21/2021 (Exact Date)          PHYSICAL EXAMINATION    Constitutional  · Appearance: well-nourished, well developed, alert, in no acute distress    HENT  · Head and Face: appears normal    Skin  · General Inspection: no rash, no lesions identified    Neurologic/Psychiatric  · Mental Status:  · Orientation: grossly oriented to person, place and time  · Mood and Affect: mood normal, affect appropriate    Assessment:   DU bleeding heavy secondary to skipped menses in September. US consistent with proliferative phase. Back on Clomid 50. If no onset of menses by day 35 will rx Provera and go to Clomid 100. Asks for Metrogel. Plan:   As above. Rx Metrogel. Instructions given to pt. Handouts given to pt.

## 2021-11-03 ENCOUNTER — OFFICE VISIT (OUTPATIENT)
Dept: OBGYN CLINIC | Age: 31
End: 2021-11-03

## 2021-11-03 DIAGNOSIS — N97.0 ABSENCE OF OVULATION: Primary | ICD-10-CM

## 2021-11-03 DIAGNOSIS — N93.8 DUB (DYSFUNCTIONAL UTERINE BLEEDING): ICD-10-CM

## 2021-11-03 PROCEDURE — 99214 OFFICE O/P EST MOD 30 MIN: CPT | Performed by: OBSTETRICS & GYNECOLOGY

## 2021-11-03 RX ORDER — METRONIDAZOLE 7.5 MG/G
1 GEL VAGINAL
Qty: 70 G | Refills: 1 | Status: SHIPPED | OUTPATIENT
Start: 2021-11-03 | End: 2021-11-08

## 2021-11-03 NOTE — PATIENT INSTRUCTIONS
Heavy Menstrual Periods: Care Instructions Overview If you get heavy menstrual periods and painful cramps, you may be passing large blood clots and changing sanitary pads or tampons often. You may also have periods that last longer than 7 days. A change in hormones or an irritation in the uterus can cause heavy bleeding. If you are overweight, you're more likely to have heavy menstrual periods. But in some cases, there may not be a specific cause for your heavy periods. Your doctor may recommend hormone treatments to slow or stop your periods. Sometimes a fibroid (a growth that is not cancer) can cause heavy bleeding. In that case, your doctor may recommend surgery or other treatments to remove the growth. Because blood loss from heavy periods can make you very tired and weak (anemic), your doctor may recommend that you take extra iron. Follow-up care is a key part of your treatment and safety. Be sure to make and go to all appointments, and call your doctor if you are having problems. It's also a good idea to know your test results and keep a list of the medicines you take. How can you care for yourself at home? · Get plenty of rest. 
· Keep a record of your periods. Write down when your period begins and ends and how much flow you have. That means counting the number of pads and tampons you use. Note whether they are soaked. Note any other symptoms. Take this record to your doctor appointments. · Take your medicines exactly as prescribed. Call your doctor if you think you are having a problem with your medicine. · Take pain medicines exactly as directed. ? If the doctor gave you a prescription medicine for pain, take it as prescribed. ? If you are not taking a prescription pain medicine, ask your doctor if you can take an over-the-counter medicine. · Try to reach a healthy weight. If you are trying to lose weight, do it slowly with your doctor's advice. · If you are taking iron pills: ? Try to take the pills about 1 hour before or 2 hours after meals. But you may need to take iron with some food to avoid an upset stomach. ? Vitamin C (from food or pills) helps your body absorb iron. Try taking iron pills with a glass of orange juice or other citrus fruit juice. ? Do not take antacids or drink milk or caffeine drinks (such as coffee, tea, or cola) at the same time or within 2 hours of the time that you take your iron. They can make it hard for your body to absorb the iron. ? Iron pills may cause stomach problems, such as heartburn, nausea, diarrhea, constipation, and cramps. Be sure to drink plenty of fluids, and include fruits, vegetables, and fiber in your diet each day. ? If you forget to take an iron pill, do not take a double dose of iron the next time you take a pill. ? Keep iron pills out of the reach of small children. An overdose of iron can be very dangerous. When should you call for help? Call 911 anytime you think you may need emergency care. For example, call if: 
  · You passed out (lost consciousness). Call your doctor now or seek immediate medical care if: 
  · You have new or worse belly or pelvic pain.  
  · You have severe vaginal bleeding.  
  · You feel dizzy or lightheaded, or you feel like you may faint. Watch closely for changes in your health, and be sure to contact your doctor if: 
  · You think you may be pregnant.  
  · Your bleeding gets worse.  
  · You do not get better as expected. Where can you learn more? Go to http://www.gray.com/ Enter F477 in the search box to learn more about \"Heavy Menstrual Periods: Care Instructions. \" Current as of: February 11, 2021               Content Version: 13.0 © 3225-8523 [employer]. Care instructions adapted under license by [employer] (which disclaims liability or warranty for this information).  If you have questions about a medical condition or this instruction, always ask your healthcare professional. [place] [address] any warranty or liability for your use of this information.

## 2021-11-24 RX ORDER — CLOMIPHENE CITRATE 50 MG/1
TABLET ORAL
Qty: 10 TABLET | Refills: 2 | Status: SHIPPED | OUTPATIENT
Start: 2021-11-24

## 2021-12-04 ENCOUNTER — HOSPITAL ENCOUNTER (EMERGENCY)
Age: 31
Discharge: HOME | End: 2021-12-04
Attending: EMERGENCY MEDICINE
Payer: MEDICAID

## 2021-12-04 VITALS
DIASTOLIC BLOOD PRESSURE: 72 MMHG | SYSTOLIC BLOOD PRESSURE: 115 MMHG | TEMPERATURE: 97.5 F | HEIGHT: 62 IN | RESPIRATION RATE: 15 BRPM | OXYGEN SATURATION: 100 % | WEIGHT: 150 LBS | BODY MASS INDEX: 27.6 KG/M2 | HEART RATE: 70 BPM

## 2021-12-04 DIAGNOSIS — K62.5 RECTAL BLEEDING: Primary | ICD-10-CM

## 2021-12-04 LAB
ALBUMIN SERPL-MCNC: 3.5 G/DL (ref 3.5–5)
ALBUMIN/GLOB SERPL: 0.8 {RATIO} (ref 1.1–2.2)
ALP SERPL-CCNC: 85 U/L (ref 45–117)
ALT SERPL-CCNC: 36 U/L (ref 12–78)
ANION GAP SERPL CALC-SCNC: 3 MMOL/L (ref 5–15)
AST SERPL-CCNC: 29 U/L (ref 15–37)
BASOPHILS # BLD: 0 K/UL (ref 0–0.1)
BASOPHILS NFR BLD: 0 % (ref 0–1)
BILIRUB SERPL-MCNC: 0.2 MG/DL (ref 0.2–1)
BUN SERPL-MCNC: 17 MG/DL (ref 6–20)
BUN/CREAT SERPL: 29 (ref 12–20)
CALCIUM SERPL-MCNC: 8.8 MG/DL (ref 8.5–10.1)
CHLORIDE SERPL-SCNC: 107 MMOL/L (ref 97–108)
CO2 SERPL-SCNC: 31 MMOL/L (ref 21–32)
COMMENT, HOLDF: NORMAL
CREAT SERPL-MCNC: 0.58 MG/DL (ref 0.55–1.02)
DIFFERENTIAL METHOD BLD: ABNORMAL
EOSINOPHIL # BLD: 0.3 K/UL (ref 0–0.4)
EOSINOPHIL NFR BLD: 3 % (ref 0–7)
ERYTHROCYTE [DISTWIDTH] IN BLOOD BY AUTOMATED COUNT: 12.5 % (ref 11.5–14.5)
GLOBULIN SER CALC-MCNC: 4.4 G/DL (ref 2–4)
GLUCOSE SERPL-MCNC: 73 MG/DL (ref 65–100)
HCT VFR BLD AUTO: 37.4 % (ref 35–47)
HEMOCCULT STL QL: POSITIVE
HGB BLD-MCNC: 11.8 G/DL (ref 11.5–16)
IMM GRANULOCYTES # BLD AUTO: 0 K/UL
IMM GRANULOCYTES NFR BLD AUTO: 0 %
LYMPHOCYTES # BLD: 4.7 K/UL (ref 0.8–3.5)
LYMPHOCYTES NFR BLD: 57 % (ref 12–49)
MCH RBC QN AUTO: 28 PG (ref 26–34)
MCHC RBC AUTO-ENTMCNC: 31.6 G/DL (ref 30–36.5)
MCV RBC AUTO: 88.6 FL (ref 80–99)
MONOCYTES # BLD: 0.3 K/UL (ref 0–1)
MONOCYTES NFR BLD: 4 % (ref 5–13)
NEUTS SEG # BLD: 3 K/UL (ref 1.8–8)
NEUTS SEG NFR BLD: 36 % (ref 32–75)
NRBC # BLD: 0 K/UL (ref 0–0.01)
NRBC BLD-RTO: 0 PER 100 WBC
PLATELET # BLD AUTO: 425 K/UL (ref 150–400)
PMV BLD AUTO: 9.1 FL (ref 8.9–12.9)
POTASSIUM SERPL-SCNC: 3.2 MMOL/L (ref 3.5–5.1)
PROT SERPL-MCNC: 7.9 G/DL (ref 6.4–8.2)
RBC # BLD AUTO: 4.22 M/UL (ref 3.8–5.2)
RBC MORPH BLD: ABNORMAL
SAMPLES BEING HELD,HOLD: NORMAL
SODIUM SERPL-SCNC: 141 MMOL/L (ref 136–145)
WBC # BLD AUTO: 8.3 K/UL (ref 3.6–11)

## 2021-12-04 PROCEDURE — 82272 OCCULT BLD FECES 1-3 TESTS: CPT

## 2021-12-04 PROCEDURE — 99282 EMERGENCY DEPT VISIT SF MDM: CPT

## 2021-12-04 PROCEDURE — 85025 COMPLETE CBC W/AUTO DIFF WBC: CPT

## 2021-12-04 PROCEDURE — 80053 COMPREHEN METABOLIC PANEL: CPT

## 2021-12-04 PROCEDURE — 36415 COLL VENOUS BLD VENIPUNCTURE: CPT

## 2021-12-05 NOTE — ED TRIAGE NOTES
Patient arrives ambulatory to ED with complaints of bright red blood in stool x 3 months    Lower abdominal and back pain     Denies n/v/d    Colonoscopy last year

## 2021-12-05 NOTE — ED PROVIDER NOTES
Patient is a 63-year-old woman with a history of lower GI bleed. She had a colonoscopy last year in the setting of the lower GI bleeding and had an improvement in her bleeding after her procedure; she is unsure what was done in the procedure but she believes the gastroenterologist found the source of the blood. 3 months ago the bleeding returned and she has had bright red blood per rectum with each bowel movement. She describes the blood as sometimes mixed in with her stool, sometimes coating her stool, and sometimes on its own. She has not had any chest pain, lightheadedness, shortness of breath associated with this. The history is provided by the patient.         Past Medical History:   Diagnosis Date    Chlamydia 2019    per pt    COVID-19 2021    recovered at home    Diabetes mellitus type 1 ([facility] [address] 2012    IDDM    Hypercholesterolemia     Migraine     Mild depression ([facility] [address] 2012    Pap smear abnormality of cervix/human papillomavirus (HPV) positive 2020, 2011    Postpartum depression 2012    Preeclampsia     Thrombocytosis 3/1/2019    Trichomonas infection 2020    Vitamin D deficiency        Past Surgical History:   Procedure Laterality Date    HX  SECTION  ;2016    x2    [place]  DELIVERY ONLY      c/s for LGA          Family History:   Problem Relation Age of Onset    Diabetes Mother         Type II    Hypertension Mother     Coronary Art Dis Mother         stent placed    Diabetes Sister 25        Type II    Hypertension Sister     Coronary Art Dis Sister         stent placed    Kidney Disease Sister     Other Sister 34        Covid    Diabetes Brother     Dementia Maternal Grandfather        Social History     Socioeconomic History    Marital status: SINGLE     Spouse name: Not on file    Number of children: Not on file    Years of education: Not on file    Highest education level: Not on file   Occupational History  Not on file   Tobacco Use    Smoking status: Never Smoker    Smokeless tobacco: Never Used   Vaping Use    Vaping Use: Never used   Substance and Sexual Activity    Alcohol use: No    Drug use: No    Sexual activity: Yes     Partners: Male     Birth control/protection: None   Other Topics Concern    Not on file   Social History Narrative    Not on file     Social Determinants of Health     Financial Resource Strain:     Difficulty of Paying Living Expenses: Not on file   Food Insecurity:     Worried About Running Out of Food in the Last Year: Not on file    Cici of Food in the Last Year: Not on file   Transportation Needs:     Lack of Transportation (Medical): Not on file    Lack of Transportation (Non-Medical): Not on file   Physical Activity:     Days of Exercise per Week: Not on file    Minutes of Exercise per Session: Not on file   Stress:     Feeling of Stress : Not on file   Social Connections:     Frequency of Communication with Friends and Family: Not on file    Frequency of Social Gatherings with Friends and Family: Not on file    Attends Latter-day Services: Not on file    Active Member of [address] [place] [address] or Organizations: Not on file    Attends Club or Organization Meetings: Not on file    Marital Status: Not on file   Intimate Partner Violence:     Fear of Current or Ex-Partner: Not on file    Emotionally Abused: Not on file    Physically Abused: Not on file    Sexually Abused: Not on file   Housing Stability:     Unable to Pay for Housing in the Last Year: Not on file    Number of [place] in the Last Year: Not on file    Unstable Housing in the Last Year: Not on file         ALLERGIES: Patient has no known allergies. Review of Systems   Constitutional: Negative for chills and fever. Respiratory: Negative for shortness of breath. Cardiovascular: Negative for chest pain. Gastrointestinal: Positive for blood in stool. Negative for abdominal pain, nausea and vomiting. Neurological: Negative for light-headedness. All other systems reviewed and are negative. Vitals:    12/04/21 2051   BP: 115/72   Pulse: 70   Resp: 15   Temp: 97.5 °F (36.4 °C)   SpO2: 100%   Weight: 68 kg (150 lb)   Height: 5' 2\" (1.575 m)            Physical Exam  Vitals and nursing note reviewed. Constitutional:       Appearance: She is well-developed. HENT:      Head: Normocephalic and atraumatic. Eyes:      Pupils: Pupils are equal, round, and reactive to light. Cardiovascular:      Rate and Rhythm: Normal rate. Pulmonary:      Effort: Pulmonary effort is normal.   Abdominal:      General: There is no distension. Palpations: Abdomen is soft. Tenderness: There is no abdominal tenderness. Genitourinary:     Rectum: Normal. Guaiac result positive (brown stool). Musculoskeletal:      Cervical back: Normal range of motion and neck supple. Skin:     General: Skin is warm and dry. Capillary Refill: Capillary refill takes less than 2 seconds. Neurological:      General: No focal deficit present. Mental Status: She is alert and oriented to person, place, and time. Psychiatric:         Mood and Affect: Mood normal.         Behavior: Behavior normal.          MDM       Procedures    Patient presents to the emergency department with chronic bright red blood per rectum. No melena on exam, patient is hemodynamically stable, patient has no evidence of anemia or sepsis. Patient will be discharged home to follow-up with gastroenterology for likely repeat colonoscopy. Patient was given return precautions and expressed understanding, agreed with this plan. The patient's results have been reviewed with them and/or available family.  Patient and/or family verbally conveyed their understanding and agreement of the patient's signs, symptoms, diagnosis, treatment and prognosis and additionally agree to follow up as recommended in the discharge instructions or to return to the Emergency Room should their condition change prior to their follow-up appointment. The patient/family verbally agrees with the care-plan and verbally conveys that all of their questions have been answered. The discharge instructions have also been provided to the patient and/or family with some educational information regarding the patient's diagnosis as well a list of reasons why the patient would want to return to the ER prior to their follow-up appointment, should their condition change.

## 2021-12-17 ENCOUNTER — HOSPITAL ENCOUNTER (EMERGENCY)
Age: 31
Discharge: HOME | End: 2021-12-17
Attending: EMERGENCY MEDICINE
Payer: MEDICAID

## 2021-12-17 VITALS
OXYGEN SATURATION: 98 % | HEART RATE: 88 BPM | TEMPERATURE: 97.6 F | WEIGHT: 150 LBS | DIASTOLIC BLOOD PRESSURE: 75 MMHG | RESPIRATION RATE: 18 BRPM | SYSTOLIC BLOOD PRESSURE: 128 MMHG | BODY MASS INDEX: 27.6 KG/M2 | HEIGHT: 62 IN

## 2021-12-17 DIAGNOSIS — R11.2 INTRACTABLE VOMITING WITH NAUSEA, UNSPECIFIED VOMITING TYPE: Primary | ICD-10-CM

## 2021-12-17 LAB
ALBUMIN SERPL-MCNC: 3.6 G/DL (ref 3.5–5)
ALBUMIN/GLOB SERPL: 0.8 {RATIO} (ref 1.1–2.2)
ALP SERPL-CCNC: 81 U/L (ref 45–117)
ALT SERPL-CCNC: 32 U/L (ref 12–78)
ANION GAP SERPL CALC-SCNC: 5 MMOL/L (ref 5–15)
APTT PPP: 24.8 SEC (ref 22.1–31)
AST SERPL-CCNC: 19 U/L (ref 15–37)
BASOPHILS # BLD: 0 K/UL (ref 0–0.1)
BASOPHILS NFR BLD: 0 % (ref 0–1)
BILIRUB SERPL-MCNC: 0.3 MG/DL (ref 0.2–1)
BUN SERPL-MCNC: 14 MG/DL (ref 6–20)
BUN/CREAT SERPL: 22 (ref 12–20)
CALCIUM SERPL-MCNC: 8.9 MG/DL (ref 8.5–10.1)
CHLORIDE SERPL-SCNC: 107 MMOL/L (ref 97–108)
CO2 SERPL-SCNC: 26 MMOL/L (ref 21–32)
COMMENT, HOLDF: NORMAL
CREAT SERPL-MCNC: 0.64 MG/DL (ref 0.55–1.02)
DIFFERENTIAL METHOD BLD: ABNORMAL
EOSINOPHIL # BLD: 0.1 K/UL (ref 0–0.4)
EOSINOPHIL NFR BLD: 1 % (ref 0–7)
ERYTHROCYTE [DISTWIDTH] IN BLOOD BY AUTOMATED COUNT: 12.4 % (ref 11.5–14.5)
GLOBULIN SER CALC-MCNC: 4.7 G/DL (ref 2–4)
GLUCOSE SERPL-MCNC: 149 MG/DL (ref 65–100)
HCT VFR BLD AUTO: 38.6 % (ref 35–47)
HGB BLD-MCNC: 12.3 G/DL (ref 11.5–16)
IMM GRANULOCYTES # BLD AUTO: 0 K/UL (ref 0–0.04)
IMM GRANULOCYTES NFR BLD AUTO: 0 % (ref 0–0.5)
INR PPP: 1 (ref 0.9–1.1)
LIPASE SERPL-CCNC: 59 U/L (ref 73–393)
LYMPHOCYTES # BLD: 2.8 K/UL (ref 0.8–3.5)
LYMPHOCYTES NFR BLD: 30 % (ref 12–49)
MCH RBC QN AUTO: 27.9 PG (ref 26–34)
MCHC RBC AUTO-ENTMCNC: 31.9 G/DL (ref 30–36.5)
MCV RBC AUTO: 87.5 FL (ref 80–99)
MONOCYTES # BLD: 0.7 K/UL (ref 0–1)
MONOCYTES NFR BLD: 8 % (ref 5–13)
NEUTS SEG # BLD: 5.7 K/UL (ref 1.8–8)
NEUTS SEG NFR BLD: 61 % (ref 32–75)
NRBC # BLD: 0 K/UL (ref 0–0.01)
NRBC BLD-RTO: 0 PER 100 WBC
PLATELET # BLD AUTO: 408 K/UL (ref 150–400)
PMV BLD AUTO: 9.6 FL (ref 8.9–12.9)
POTASSIUM SERPL-SCNC: 3.6 MMOL/L (ref 3.5–5.1)
PROT SERPL-MCNC: 8.3 G/DL (ref 6.4–8.2)
PROTHROMBIN TIME: 10.1 SEC (ref 9–11.1)
RBC # BLD AUTO: 4.41 M/UL (ref 3.8–5.2)
SAMPLES BEING HELD,HOLD: NORMAL
SODIUM SERPL-SCNC: 138 MMOL/L (ref 136–145)
THERAPEUTIC RANGE,PTTT: NORMAL SECS (ref 58–77)
WBC # BLD AUTO: 9.3 K/UL (ref 3.6–11)

## 2021-12-17 PROCEDURE — 74011250636 HC RX REV CODE- 250/636: Performed by: EMERGENCY MEDICINE

## 2021-12-17 PROCEDURE — 96361 HYDRATE IV INFUSION ADD-ON: CPT

## 2021-12-17 PROCEDURE — 83690 ASSAY OF LIPASE: CPT

## 2021-12-17 PROCEDURE — 96375 TX/PRO/DX INJ NEW DRUG ADDON: CPT

## 2021-12-17 PROCEDURE — 36415 COLL VENOUS BLD VENIPUNCTURE: CPT

## 2021-12-17 PROCEDURE — 80053 COMPREHEN METABOLIC PANEL: CPT

## 2021-12-17 PROCEDURE — 85025 COMPLETE CBC W/AUTO DIFF WBC: CPT

## 2021-12-17 PROCEDURE — 99282 EMERGENCY DEPT VISIT SF MDM: CPT

## 2021-12-17 PROCEDURE — 85610 PROTHROMBIN TIME: CPT

## 2021-12-17 PROCEDURE — 96374 THER/PROPH/DIAG INJ IV PUSH: CPT

## 2021-12-17 PROCEDURE — 85730 THROMBOPLASTIN TIME PARTIAL: CPT

## 2021-12-17 RX ORDER — ONDANSETRON 2 MG/ML
8 INJECTION INTRAMUSCULAR; INTRAVENOUS
Status: COMPLETED | OUTPATIENT
Start: 2021-12-17 | End: 2021-12-17

## 2021-12-17 RX ORDER — ONDANSETRON 8 MG/1
8 TABLET, ORALLY DISINTEGRATING ORAL
Qty: 10 TABLET | Refills: 0 | Status: SHIPPED | OUTPATIENT
Start: 2021-12-17

## 2021-12-17 RX ORDER — OMEPRAZOLE 40 MG/1
40 CAPSULE, DELAYED RELEASE ORAL 2 TIMES DAILY
Qty: 60 CAPSULE | Refills: 0 | Status: SHIPPED | OUTPATIENT
Start: 2021-12-17

## 2021-12-17 RX ADMIN — ONDANSETRON 8 MG: 2 INJECTION INTRAMUSCULAR; INTRAVENOUS at 04:52

## 2021-12-17 RX ADMIN — FAMOTIDINE 20 MG: 10 INJECTION, SOLUTION INTRAVENOUS at 04:52

## 2021-12-17 RX ADMIN — SODIUM CHLORIDE 500 ML: 9 INJECTION, SOLUTION INTRAVENOUS at 04:50

## 2021-12-17 NOTE — Clinical Note
[address]  [facility] EMERGENCY DEPT  [address] [id number]  [phone]    Work/School Note    Date: 12/17/2021    To Whom It May concern:    Mark Guevara was seen and treated today in the emergency room by the following provider(s):  Attending Provider: Victor M Arroyo MD.      Mark Guevara is excused from work/school on 12/17/21 and 12/18/21. She is medically clear to return to work/school on 12/19/2021.        Sincerely,          Chriss Shaikh MD

## 2021-12-17 NOTE — ED PROVIDER NOTES
49-year-old female who presents to the ER for evaluation for 1-2 episodes of panic vomiting this evening. The patient is a complaint of mild abdominal discomfort that have resolved at this time. She denies any headache, sore throat, cough or congestion, chest pain, shortness of breath, diarrhea, constipation, dysuria, dizziness, extremity weakness or numbness. The patient does have a history of GI bleed however she has been seen and had a normal colonoscopy.            Past Medical History:   Diagnosis Date    Chlamydia 2019    per pt    COVID-19 2021    recovered at home    Diabetes mellitus type 1 ([facility] [address] 2012    IDDM    Hypercholesterolemia     Migraine     Mild depression ([facility] 2012    Pap smear abnormality of cervix/human papillomavirus (HPV) positive 2020, 2011    Postpartum depression 2012    Preeclampsia     Thrombocytosis 3/1/2019    Trichomonas infection 2020    Vitamin D deficiency        Past Surgical History:   Procedure Laterality Date    HX  SECTION  ;2016    x2    [place]      c/s for L[place]          Family History:   Problem Relation Age of Onset    Diabetes Mother         Type II    Hypertension Mother     Coronary Art Dis Mother         stent placed    Diabetes Sister 25        Type II    Hypertension Sister     Coronary Art Dis Sister         stent placed    Kidney Disease Sister     Other Sister 34        Covid    Diabetes Brother     Dementia Maternal Grandfather        Social History     Socioeconomic History    Marital status: SINGLE     Spouse name: Not on file    Number of children: Not on file    Years of education: Not on file    Highest education level: Not on file   Occupational History    Not on file   Tobacco Use    Smoking status: Never Smoker    Smokeless tobacco: Never Used   Vaping Use    Vaping Use: Never used   Substance and Sexual Activity    Alcohol use: No    Drug use: No    Sexual activity: Yes     Partners: Male     Birth control/protection: None   Other Topics Concern    Not on file   Social History Narrative    Not on file     Social Determinants of Health     Financial Resource Strain:     Difficulty of Paying Living Expenses: Not on file   Food Insecurity:     Worried About Running Out of Food in the Last Year: Not on file    Cici of Food in the Last Year: Not on file   Transportation Needs:     Lack of Transportation (Medical): Not on file    Lack of Transportation (Non-Medical): Not on file   Physical Activity:     Days of Exercise per Week: Not on file    Minutes of Exercise per Session: Not on file   Stress:     Feeling of Stress : Not on file   Social Connections:     Frequency of Communication with Friends and Family: Not on file    Frequency of Social Gatherings with Friends and Family: Not on file    Attends Gnosticist Services: Not on file    Active Member of [address] [place] [address] [employer] or Organizations: Not on file    Attends Club or Organization Meetings: Not on file    Marital Status: Not on file   Intimate Partner Violence:     Fear of Current or Ex-Partner: Not on file    Emotionally Abused: Not on file    Physically Abused: Not on file    Sexually Abused: Not on file   Housing Stability:     Unable to Pay for Housing in the Last Year: Not on file    Number of [place] in the Last Year: Not on file    Unstable Housing in the Last Year: Not on file         ALLERGIES: Patient has no known allergies. Review of Systems   All other systems reviewed and are negative. Vitals:    12/17/21 0439   BP: 128/75   Pulse: 88   Resp: 18   Temp: 97.6 °F (36.4 °C)   SpO2: 98%   Weight: 68 kg (150 lb)   Height: 5' 2\" (1.575 m)            Physical Exam  Vitals and nursing note reviewed.           CONSTITUTIONAL: Well-appearing; well-nourished; in no apparent distress  HEAD: Normocephalic; atraumatic  EYES: PERRL; EOM intact; conjunctiva and sclera are clear bilaterally. ENT: No rhinorrhea; normal pharynx with no tonsillar hypertrophy; mucous membranes pink/moist, no erythema, no exudate. NECK: Supple; non-tender; no cervical lymphadenopathy  CARD: Normal S1, S2; no murmurs, rubs, or gallops. Regular rate and rhythm. RESP: Normal respiratory effort; breath sounds clear and equal bilaterally; no wheezes, rhonchi, or rales. ABD: Normal bowel sounds; non-distended; non-tender; no palpable organomegaly, no masses, no bruits. Back Exam: Normal inspection; no vertebral point tenderness, no CVA tenderness. Normal range of motion. EXT: Normal ROM in all four extremities; non-tender to palpation; no swelling or deformity; distal pulses are normal, no edema. SKIN: Warm; dry; no rash. NEURO:Alert and oriented x 3, coherent, CELIA-XII grossly intact, sensory and motor are non-focal.        MDM  Number of Diagnoses or Management Options  Intractable vomiting with nausea, unspecified vomiting type  Diagnosis management comments: Assessment: 77-year-old female who presents to the ER for evaluation for 1-2 episodes of vomiting today. She has a fairly benign exam with stable vital signs. She looks well. She will need evaluation for electrolyte abnormality and dehydration. Plan: Lab/IV fluid/antiemetic/Pepcid/p.o. challenge/ Monitor and Reevaluate.          Amount and/or Complexity of Data Reviewed  Clinical lab tests: ordered and reviewed  Tests in the radiology section of CPT®: ordered and reviewed  Tests in the medicine section of CPT®: reviewed and ordered  Discussion of test results with the performing providers: yes  Decide to obtain previous medical records or to obtain history from someone other than the patient: yes  Obtain history from someone other than the patient: yes  Review and summarize past medical records: yes  Discuss the patient with other providers: yes  Independent visualization of images, tracings, or specimens: yes           Procedures    Progress Note:   Pt has been reexamined by Amilcar Toscano MD. Pt is feeling much better. Symptoms have improved. All available results have been reviewed with pt and any available family. Pt understands sx, dx, and tx in ED. the patient was given p.o. challenge that she tolerated well. She denies any further discomfort. Care plan has been outlined and questions have been answered. Pt is ready to go home. Will send home on nausea and vomiting instruction prescription of Prilosec and Zofran. Oral rehydration education. . Outpatient referral with PCP as needed. Written by Amilcar Toscano MD,8:45 AM    .   .

## 2021-12-17 NOTE — ED TRIAGE NOTES
Pt arrives with complaint of vomiting once one hr ago. Pt states that vomit was brown. Hx of GI bleed.

## 2021-12-29 ENCOUNTER — TRANSCRIBE ORDER (OUTPATIENT)
Dept: SCHEDULING | Age: 31
End: 2021-12-29

## 2021-12-29 DIAGNOSIS — R10.13 EPIGASTRIC ABDOMINAL PAIN: Primary | ICD-10-CM

## 2021-12-29 DIAGNOSIS — K62.5 RECTAL BLEEDING: ICD-10-CM

## 2021-12-29 DIAGNOSIS — K59.00 CONSTIPATION: ICD-10-CM

## 2021-12-29 DIAGNOSIS — R11.2 NAUSEA AND VOMITING: ICD-10-CM

## 2022-01-24 NOTE — PROGRESS NOTES
Abnormal bleeding note      Yolanda Morales is a 32 y.o. female who complains of vaginal bleeding problems. Her current method of family planning is none. She has been taking Clomid to get pregnant. Today is day 28 after taking Clomid. She last took the Clomid--100mg on 22. She states she has been bleeding every day since November but her period started on 21     She developed this problem in November. She has had vaginal bleeding which she describes as light-heavy lasting everyday since November. She states her bleeding is heavier when she is on her period and after intercourse. Pad or tampon count: changes every 1-2 hours when its heavy. Associated symptoms include pelvic pain. Alleviating factors: none    Aggravating factors: none      The patient is sexually active. Last Pap smear:was abnormal in -HPV pos.     Her relevant past medical history:   Past Medical History:   Diagnosis Date    Chlamydia 2019    per pt    COVID-19 2021    recovered at home    Diabetes mellitus type 1 ([facility] [address] 2012    IDDM    Hypercholesterolemia     Migraine     Mild depression ([facility] [address] 2012    Pap smear abnormality of cervix/human papillomavirus (HPV) positive 2020, 2011    Postpartum depression 2012    Preeclampsia     Thrombocytosis 3/1/2019    Trichomonas infection 2020    Vitamin D deficiency         Past Surgical History:   Procedure Laterality Date    HX  SECTION  ;2016    x2    [place]  DELIVERY [place]      c/s for LGA      Social History     Occupational History    Not on file   Tobacco Use    Smoking status: Never Smoker    Smokeless tobacco: Never Used   Vaping Use    Vaping Use: Never used   Substance and Sexual Activity    Alcohol use: No    Drug use: No    Sexual activity: Yes     Partners: Male     Birth control/protection: None     Family History   Problem Relation Age of Onset    Diabetes Mother         Type II  Hypertension Mother     Coronary Art Dis Mother         stent placed    Diabetes Sister 25        Type II    Hypertension Sister     Coronary Art Dis Sister         stent placed    Kidney Disease Sister     Other Sister 34        Covid    Diabetes Brother     Dementia Maternal Grandfather        No Known Allergies  Prior to Admission medications    Medication Sig Start Date End Date Taking? Authorizing Provider   clomiPHENE (CLOMID) 50 mg tablet Take two tablets days 5-9 after menses start 11/24/21   Alma Jones MD   omeprazole (PRILOSEC) 40 mg capsule Take 1 Capsule by mouth two (2) times a day. 12/17/21   Denise Lowery MD   ondansetron (ZOFRAN ODT) 8 mg disintegrating tablet Take 1 Tablet by mouth every eight (8) hours as needed for Nausea or Vomiting. 12/17/21   Denise Lowery MD   insulin glargine (Lantus Solostar U-100 Insulin) 100 unit/mL (3 mL) inpn INJECT 25 UNITS UNDER THE SKIN IN THE EVENING 11/11/21   Kayla Espinal PA   Insulin Syringe-Needle U-100 (BD Insulin Syringe Ultra-Fine) 0.3 mL 31 gauge x 5/16\" syrg USE 3 TIMES A DAY 11/11/21   Kayla Espinal PA   insulin aspart U-100 (NovoLOG Flexpen U-100 Insulin) 100 unit/mL (3 mL) inpn INJECT 30 TO 35 UNITS THREE TIMES A DAY, BEFORE MEALS 11/11/21   Kayla Espinal PA   Blood-Glucose Meter (OneTouch UltraMini) monitoring kit Test blood glucose 4 times daily Dx Code: E11.65 11/10/21   Kayla Espinal PA   medroxyPROGESTERone (PROVERA) 10 mg tablet Take 1 Tablet by mouth daily.  5/24/21   Amairani Redd MD   glucose blood VI test strips [facility] ULTRA TEST) strip Test blood glucose 4 daily Dx Code: E11.65 11/17/15   Austin Albrecht MD   Lancets misc Test blood glucose 4 times daily Dx Code: E11.65 11/17/15   Austin Albrecht MD        Review of Systems - History obtained from the patient  Constitutional: negative for weight loss, fever, night sweats  HEENT: negative for hearing loss, earache, congestion, snoring, sorethroat  CV: negative for chest pain, palpitations, edema  Resp: negative for cough, shortness of breath, wheezing  Breast: negative for breast lumps, nipple discharge, galactorrhea  GI: negative for change in bowel habits, abdominal pain, black or bloody stools  : negative for frequency, dysuria, hematuria  MSK: negative for back pain, joint pain, muscle pain  Skin: negative for itching, rash, hives  Neuro: negative for dizziness, headache, confusion, weakness  Psych: negative for anxiety, depression, change in mood  Heme/lymph: negative for bleeding, bruising, pallor      Objective: There were no vitals taken for this visit.        PHYSICAL EXAMINATION    Constitutional  · Appearance: well-nourished, well developed, alert, in no acute distress    HENT  · Head and Face: appears normal    Gastrointestinal  · Abdominal Examination: abdomen non-tender to palpation, normal bowel sounds, no masses present  · Liver and spleen: no hepatomegaly present, spleen not palpable  · Hernias: no hernias identified    Genitourinary  · External Genitalia: normal appearance for age, no discharge present, no tenderness present, no inflammatory lesions present, no masses present, no atrophy present  · Vagina: normal vaginal vault without central or paravaginal defects, no discharge present, no inflammatory lesions present, no masses present  · Bladder: non-tender to palpation  · Urethra: appears normal  · Cervix: normal--mucus is thin and stretchy   · Uterus: normal size, shape and consistency  · Adnexa: no adnexal tenderness present, no adnexal masses present  · Perineum: perineum within normal limits, no evidence of trauma, no rashes or skin lesions present  · Anus: anus within normal limits, no hemorrhoids present  · Inguinal Lymph Nodes: no lymphadenopathy present    Skin  · General Inspection: no rash, no lesions identified    Neurologic/Psychiatric  · Mental Status:  · Orientation: grossly oriented to person, place and time  · Mood and Affect: mood normal, affect appropriate    Assessment:   DU bleeding consistent with anovulation. Cx mucus is consistent with that also    Plan:   Check HCG and progesterone. Next step would be 150 mg clomid or refer to [facility] at [facility] [place]. Instructions given to pt. Handouts given to pt.

## 2022-01-26 ENCOUNTER — OFFICE VISIT (OUTPATIENT)
Dept: OBGYN CLINIC | Age: 32
End: 2022-01-26
Payer: MEDICAID

## 2022-01-26 DIAGNOSIS — N97.0 ABSENCE OF OVULATION: ICD-10-CM

## 2022-01-26 DIAGNOSIS — N93.8 DUB (DYSFUNCTIONAL UTERINE BLEEDING): ICD-10-CM

## 2022-01-26 DIAGNOSIS — Z11.3 VENEREAL DISEASE SCREENING: Primary | ICD-10-CM

## 2022-01-26 PROCEDURE — 99214 OFFICE O/P EST MOD 30 MIN: CPT | Performed by: OBSTETRICS & GYNECOLOGY

## 2022-01-27 LAB — HCG SERPL-ACNC: <1 MIU/ML (ref 0–6)

## 2022-01-28 DIAGNOSIS — N97.0 ABSENCE OF OVULATION: Primary | ICD-10-CM

## 2022-01-28 LAB
C TRACH RRNA SPEC QL NAA+PROBE: NEGATIVE
N GONORRHOEA RRNA SPEC QL NAA+PROBE: NEGATIVE
PROGEST SERPL-MCNC: 4.5 NG/ML
T VAGINALIS DNA SPEC QL NAA+PROBE: NEGATIVE

## 2022-02-02 ENCOUNTER — HOSPITAL ENCOUNTER (OUTPATIENT)
Dept: NUCLEAR MEDICINE | Age: 32
Discharge: HOME | End: 2022-02-02
Attending: PHYSICIAN ASSISTANT
Payer: MEDICAID

## 2022-02-02 DIAGNOSIS — K59.00 CONSTIPATION: ICD-10-CM

## 2022-02-02 DIAGNOSIS — R11.2 NAUSEA AND VOMITING: ICD-10-CM

## 2022-02-02 DIAGNOSIS — K62.5 RECTAL BLEEDING: ICD-10-CM

## 2022-02-02 DIAGNOSIS — R10.13 EPIGASTRIC ABDOMINAL PAIN: ICD-10-CM

## 2022-02-02 PROCEDURE — 78264 GASTRIC EMPTYING IMG STUDY: CPT

## 2022-02-02 RX ORDER — TECHNETIUM TC 99M SULFUR COLLOID 2 MG
0.3 KIT MISCELLANEOUS
Status: COMPLETED | OUTPATIENT
Start: 2022-02-02 | End: 2022-02-02

## 2022-02-02 RX ADMIN — TECHNETIUM TC 99M SULFUR COLLOID 0.3 MILLICURIE: KIT at 10:46

## 2022-03-18 PROBLEM — D75.839 THROMBOCYTOSIS: Status: ACTIVE | Noted: 2019-03-01

## 2022-03-19 PROBLEM — E66.01 SEVERE OBESITY (BMI 35.0-39.9) WITH COMORBIDITY (HCC): Status: ACTIVE | Noted: 2020-06-15

## 2022-03-19 PROBLEM — Z91.199 NONADHERENCE TO MEDICAL TREATMENT: Status: ACTIVE | Noted: 2020-06-15

## 2022-03-19 PROBLEM — N76.0 VAGINITIS: Status: ACTIVE | Noted: 2017-08-23

## 2022-08-08 ENCOUNTER — LAB ONLY (OUTPATIENT)
Dept: OBGYN CLINIC | Age: 32
End: 2022-08-08

## 2022-08-08 DIAGNOSIS — N92.6 IRREGULAR MENSTRUAL CYCLE: Primary | ICD-10-CM

## 2022-08-08 DIAGNOSIS — Z11.4 SCREENING FOR HUMAN IMMUNODEFICIENCY VIRUS: ICD-10-CM

## 2022-08-08 DIAGNOSIS — Z11.59 SCREENING EXAMINATION FOR POLIOMYELITIS: ICD-10-CM

## 2022-08-08 DIAGNOSIS — Z01.83 ENCOUNTER FOR BLOOD TYPING: ICD-10-CM

## 2022-08-08 DIAGNOSIS — Z11.3 SCREENING EXAMINATION FOR VENEREAL DISEASE: ICD-10-CM

## 2022-08-14 LAB
ABO GROUP BLD: NORMAL
BLD GP AB SCN SERPL QL: NEGATIVE
CMV IGG SERPL IA-ACNC: 2.5 U/ML (ref 0–0.59)
CMV IGM SERPL IA-ACNC: <30 AU/ML (ref 0–29.9)
ERYTHROCYTE [DISTWIDTH] IN BLOOD BY AUTOMATED COUNT: 14.7 % (ref 11.7–15.4)
EST. AVERAGE GLUCOSE BLD GHB EST-MCNC: 180 MG/DL
ESTRADIOL SERPL-MCNC: 37.9 PG/ML
FSH SERPL-ACNC: 2.1 MIU/ML
HBA1C MFR BLD: 7.9 % (ref 4.8–5.6)
HBV SURFACE AG SERPL QL IA: NEGATIVE
HCT VFR BLD AUTO: 34.6 % (ref 34–46.6)
HCV AB S/CO SERPL IA: <0.1 S/CO RATIO (ref 0–0.9)
HGB BLD-MCNC: 11 G/DL (ref 11.1–15.9)
HIV 1+2 AB+HIV1 P24 AG SERPL QL IA: NON REACTIVE
LH SERPL-ACNC: 3.9 MIU/ML
MCH RBC QN AUTO: 26.1 PG (ref 26.6–33)
MCHC RBC AUTO-ENTMCNC: 31.8 G/DL (ref 31.5–35.7)
MCV RBC AUTO: 82 FL (ref 79–97)
MIS SERPL-MCNC: 7.02 NG/ML
PLATELET # BLD AUTO: 428 X10E3/UL (ref 150–450)
PROLACTIN SERPL-MCNC: 14.1 NG/ML (ref 4.8–23.3)
RBC # BLD AUTO: 4.21 X10E6/UL (ref 3.77–5.28)
RH BLD: POSITIVE
RPR SER QL: NON REACTIVE
RUBV IGG SERPL IA-ACNC: 1.59 INDEX
TESTOST FREE SERPL-MCNC: 1 PG/ML (ref 0–4.2)
TESTOST SERPL-MCNC: 18 NG/DL (ref 8–60)
TSH SERPL DL<=0.005 MIU/L-ACNC: 1.27 UIU/ML (ref 0.45–4.5)
VZV IGG SER IA-ACNC: 584 INDEX
WBC # BLD AUTO: 6.6 X10E3/UL (ref 3.4–10.8)

## 2022-09-28 ENCOUNTER — OFFICE VISIT (OUTPATIENT)
Dept: OBGYN CLINIC | Age: 32
End: 2022-09-28
Payer: MEDICAID

## 2022-09-28 VITALS — SYSTOLIC BLOOD PRESSURE: 117 MMHG | DIASTOLIC BLOOD PRESSURE: 73 MMHG | WEIGHT: 162.3 LBS | BODY MASS INDEX: 29.69 KG/M2

## 2022-09-28 DIAGNOSIS — N76.0 ACUTE VAGINITIS: Primary | ICD-10-CM

## 2022-09-28 PROCEDURE — 99213 OFFICE O/P EST LOW 20 MIN: CPT | Performed by: OBSTETRICS & GYNECOLOGY

## 2022-09-28 RX ORDER — METRONIDAZOLE 7.5 MG/G
1 GEL VAGINAL
Qty: 70 G | Refills: 1 | Status: SHIPPED | OUTPATIENT
Start: 2022-09-28 | End: 2022-10-03

## 2022-09-28 RX ORDER — FLUCONAZOLE 200 MG/1
200 TABLET ORAL
Qty: 3 TABLET | Refills: 1 | Status: SHIPPED | OUTPATIENT
Start: 2022-09-28 | End: 2022-10-03

## 2022-09-28 NOTE — PROGRESS NOTES
Vaginitis evaluation    Chief Complaint   Vaginitis      HPI  28 y.o. female complains of itchy, odor, white and yellow  vaginal discharge for 2 weeks  days. Patient's last menstrual period was 2022. She denies additional symptoms at this time. The patient denies aggravating factors. She is not concerned about possible STI exposure at this time. She denies exposure to new chemicals ot hygenic agents  Previous treatment included: none    Past Medical History:   Diagnosis Date    Chlamydia 2019    per pt    COVID-19 2021    recovered at home    Diabetes mellitus ([facility] [address] 2012    Type 2 diabetes mellitus without complication, unspecified whether long term insulin use (HCC)    Hypercholesterolemia     Migraine     Mild depression 2012    Pap smear abnormality of cervix/human papillomavirus (HPV) positive 2020, 2011    Postpartum depression 2012    Preeclampsia     Thrombocytosis 3/1/2019    Trichomonas infection 2020    Vitamin D deficiency      Past Surgical History:   Procedure Laterality Date    HX  SECTION  ;2016    x2    [place]  DELIVERY ONLY      c/s for LGA      Social History     Occupational History    Not on file   Tobacco Use    Smoking status: Never    Smokeless tobacco: Never   Vaping Use    Vaping Use: Never used   Substance and Sexual Activity    Alcohol use: No    Drug use: No    Sexual activity: Yes     Partners: Male     Birth control/protection: None     Family History   Problem Relation Age of Onset    Diabetes Mother         Type II    Hypertension Mother     Coronary Art Dis Mother         stent placed    Diabetes Sister 25        Type II    Hypertension Sister     Coronary Art Dis Sister         stent placed    Kidney Disease Sister     Other Sister 34        Covid    Diabetes Brother     Dementia Maternal Grandfather         No Known Allergies  Prior to Admission medications    Medication Sig Start Date End Date Taking? Authorizing Provider   insulin glargine (Lantus Solostar U-100 Insulin) 100 unit/mL (3 mL) inpn INJECT 25 UNITS UNDER THE SKIN IN THE EVENING 8/12/22  Yes Cecilia Álvarez MD   ferrous sulfate 325 mg (65 mg iron) tablet Take 1 tablet on Monday, Wednesday, and Friday 5/26/22  Yes Cecilia Álvarez MD   insulin aspart U-100 (NovoLOG Flexpen U-100 Insulin) 100 unit/mL (3 mL) inpn INJECT 30 TO 35 UNITS THREE TIMES A DAY, BEFORE MEALS 5/12/22  Yes Cecilia Álvarez MD   omeprazole (PRILOSEC) 40 mg capsule Take 1 Capsule by mouth two (2) times a day. 12/17/21  Yes Herminio Myles MD   ondansetron (ZOFRAN ODT) 8 mg disintegrating tablet Take 1 Tablet by mouth every eight (8) hours as needed for Nausea or Vomiting. 12/17/21  Yes Herminio Myles MD   clomiPHENE (CLOMID) 50 mg tablet Take two tablets days 5-9 after menses start  Patient not taking: Reported on 9/28/2022 11/24/21   Zurdo Aleman MD   Insulin Syringe-Needle U-100 (BD Insulin Syringe Ultra-Fine) 0.3 mL 31 gauge x 5/16\" syrg USE 3 TIMES A DAY 11/11/21  Yes Alexander Espinal PA   Blood-Glucose Meter (OneTouch UltraMini) monitoring kit Test blood glucose 4 times daily Dx Code: E11.65 11/10/21  Yes Alexander Espinal PA   glucose blood VI test strips (ONETOUCH ULTRA TEST) strip Test blood glucose 4 daily Dx Code: E11.65 11/17/15  Yes Nikki Sifuentes MD   Lancets misc Test blood glucose 4 times daily Dx Code: E11.65 11/17/15  Yes Nikki Sifuentes MD   atorvastatin (LIPITOR) 40 mg tablet Take 1 Tablet by mouth daily. Patient not taking: Reported on 9/28/2022 5/26/22   Cecilia Álvarez MD   norgestimate-ethinyl estradioL ([address] [place] [address],) 0.25-35 mg-mcg tab Take 1 Tablet by mouth daily. Patient not taking: Reported on 9/28/2022 2/7/22   Zurdo Aleman MD   medroxyPROGESTERone (PROVERA) 10 mg tablet Take 1 Tablet by mouth daily.   Patient not taking: Reported on 9/28/2022 5/24/21   Bonnie Gonzalez MD                      Review of Systems - History obtained from the patient  Constitutional: negative for weight loss, fever, night sweats  Breast: negative for breast lumps, nipple discharge, galactorrhea  GI: negative for change in bowel habits, abdominal pain, black or bloody stools  : negative for frequency, dysuria, hematuria  MSK: negative for back pain, joint pain, muscle pain  Skin: negative for itching, rash, hives  Neuro: negative for dizziness, headache, confusion, weakness  Psych: negative for anxiety, depression, change in mood  Heme/lymph: negative for bleeding, bruising, pallor       Objective:    Visit Vitals  /73   Wt 162 lb 4.8 oz (73.6 kg)   LMP 09/05/2022   BMI 29.69 kg/m²       Physical Exam:   PHYSICAL EXAMINATION    Constitutional  Appearance: well-nourished, well developed, alert, in no acute distress    HENT  Head and Face: appears normal    Genitourinary  External Genitalia: normal appearance for age, tan discharge present, no tenderness present, no inflammatory lesions present, no masses present, no atrophy present  Vagina:  tan, slightly frothy with some clumps discharge present, otherwise normal vaginal vault without central or paravaginal defects, no inflammatory lesions present, no masses present  Bladder: non-tender to palpation  Urethra: appears normal  Cervix: normal   Uterus: normal size, shape and consistency  Adnexa: no adnexal tenderness present, no adnexal masses present  Perineum: perineum within normal limits, no evidence of trauma, no rashes or skin lesions present  Anus: anus within normal limits, no hemorrhoids present  Inguinal Lymph Nodes: no lymphadenopathy present    Skin  General Inspection: no rash, no lesions identified    Neurologic/Psychiatric  Mental Status:  Orientation: grossly oriented to person, place and time  Mood and Affect: mood normal, affect appropriate      No results found for any visits on 09/28/22.     Assessment:   monilia vaginitis and bacterial vaginosis    Plan:   Treatment: Metrogel and Diflucan    ROV prn if symptoms persist or worsen.

## 2022-09-28 NOTE — PROGRESS NOTES
Vaginitis evaluation    Chief Complaint   No chief complaint on file. HPI  28 y.o. female complains of {pe vag discharge desc:823887} vaginal discharge for *** days. No LMP recorded. She denies additional symptoms at this time. The patient denies aggravating factors. She is not concerned about possible STI exposure at this time. She denies exposure to new chemicals ot hygenic agents  Previous treatment included: none    Past Medical History:   Diagnosis Date    Chlamydia 2019    per pt    COVID-19 2021    recovered at home    Diabetes mellitus ([facility] [address] 2012    Type 2 diabetes mellitus without complication, unspecified whether long term insulin use (HCC)    Hypercholesterolemia     Migraine     Mild depression 2012    Pap smear abnormality of cervix/human papillomavirus (HPV) positive 2020, 2011    Postpartum depression 2012    Preeclampsia     Thrombocytosis 3/1/2019    Trichomonas infection 2020    Vitamin D deficiency      Past Surgical History:   Procedure Laterality Date    HX  SECTION  ;2016    x2    [place]  DELIVERY ONLY      c/s for LGA      Social History     Occupational History    Not on file   Tobacco Use    Smoking status: Never    Smokeless tobacco: Never   Vaping Use    Vaping Use: Never used   Substance and Sexual Activity    Alcohol use: No    Drug use: No    Sexual activity: Yes     Partners: Male     Birth control/protection: None     Family History   Problem Relation Age of Onset    Diabetes Mother         Type II    Hypertension Mother     Coronary Art Dis Mother         stent placed    Diabetes Sister 25        Type II    Hypertension Sister     Coronary Art Dis Sister         stent placed    Kidney Disease Sister     Other Sister 34        Covid    Diabetes Brother     Dementia Maternal Grandfather         No Known Allergies  Prior to Admission medications    Medication Sig Start Date End Date Taking?  Authorizing Provider clomiPHENE (CLOMID) 50 mg tablet Take two tablets days 5-9 after menses start 11/24/21   Ricardo Silva MD   insulin glargine (Lantus Solostar U-100 Insulin) 100 unit/mL (3 mL) inpn INJECT 25 UNITS UNDER THE SKIN IN THE EVENING 8/12/22   Jolanta Álvarez MD   ferrous sulfate 325 mg (65 mg iron) tablet Take 1 tablet on Monday, Wednesday, and Friday 5/26/22   Jolanta Álvarez MD   atorvastatin (LIPITOR) 40 mg tablet Take 1 Tablet by mouth daily. 5/26/22   Jolanta Álvarez MD   insulin aspart U-100 (NovoLOG Flexpen U-100 Insulin) 100 unit/mL (3 mL) inpn INJECT 30 TO 35 UNITS THREE TIMES A DAY, BEFORE MEALS 5/12/22   Jolanta Álvarez MD   norgestimate-ethinyl estradioL ([facility],) 0.25-35 mg-mcg tab Take 1 Tablet by mouth daily. 2/7/22   Ricardo Silva MD   omeprazole (PRILOSEC) 40 mg capsule Take 1 Capsule by mouth two (2) times a day. 12/17/21   Ric Pope MD   ondansetron (ZOFRAN ODT) 8 mg disintegrating tablet Take 1 Tablet by mouth every eight (8) hours as needed for Nausea or Vomiting. 12/17/21   Ric Pope MD   Insulin Syringe-Needle U-100 (BD Insulin Syringe Ultra-Fine) 0.3 mL 31 gauge x 5/16\" syrg USE 3 TIMES A DAY 11/11/21   Roque Espinal PA   Blood-Glucose Meter (OneTouch UltraMini) monitoring kit Test blood glucose 4 times daily Dx Code: E11.65 11/10/21   Roque Espinal PA   medroxyPROGESTERone (PROVERA) 10 mg tablet Take 1 Tablet by mouth daily.  5/24/21   Jakie Kawasaki, MD   glucose blood VI test strips [facility] ULTRA TEST) strip Test blood glucose 4 daily Dx Code: E11.65 11/17/15   Jc Hagen MD   Lancets misc Test blood glucose 4 times daily Dx Code: E11.65 11/17/15   Jc Hagen MD                      Review of Systems - History obtained from the patient  Constitutional: negative for weight loss, fever, night sweats  Breast: negative for breast lumps, nipple discharge, galactorrhea  GI: negative for change in bowel habits, abdominal pain, black or bloody stools  : negative for frequency, dysuria, hematuria  MSK: negative for back pain, joint pain, muscle pain  Skin: negative for itching, rash, hives  Neuro: negative for dizziness, headache, confusion, weakness  Psych: negative for anxiety, depression, change in mood  Heme/lymph: negative for bleeding, bruising, pallor       Objective: There were no vitals taken for this visit. Physical Exam:   PHYSICAL EXAMINATION    Constitutional  Appearance: well-nourished, well developed, alert, in no acute distress    HENT  Head and Face: appears normal    Genitourinary  External Genitalia: normal appearance for age, *** discharge present, no tenderness present, no inflammatory lesions present, no masses present, no atrophy present  Vagina:  *** discharge present, otherwise normal vaginal vault without central or paravaginal defects, no inflammatory lesions present, no masses present  Bladder: non-tender to palpation  Urethra: appears normal  Cervix: normal   Uterus: normal size, shape and consistency  Adnexa: no adnexal tenderness present, no adnexal masses present  Perineum: perineum within normal limits, no evidence of trauma, no rashes or skin lesions present  Anus: anus within normal limits, no hemorrhoids present  Inguinal Lymph Nodes: no lymphadenopathy present    Skin  General Inspection: no rash, no lesions identified    Neurologic/Psychiatric  Mental Status:  Orientation: grossly oriented to person, place and time  Mood and Affect: mood normal, affect appropriate      {STD testing during exam, press DELETE if none performed:44355}. No results found for any visits on 09/28/22. Assessment:   {vaginitis type:603943}    Plan:   Treatment: {vaginitis tx:528626::\"abstain from coitus during course of treatment\"}    ROV prn if symptoms persist or worsen.

## 2022-10-02 LAB
A VAGINAE DNA VAG QL NAA+PROBE: ABNORMAL SCORE
BVAB2 DNA VAG QL NAA+PROBE: ABNORMAL SCORE
C ALBICANS DNA VAG QL NAA+PROBE: NEGATIVE
C GLABRATA DNA VAG QL NAA+PROBE: NEGATIVE
C TRACH DNA VAG QL NAA+PROBE: NEGATIVE
MEGA1 DNA VAG QL NAA+PROBE: ABNORMAL SCORE
N GONORRHOEA DNA VAG QL NAA+PROBE: NEGATIVE
T VAGINALIS DNA VAG QL NAA+PROBE: NEGATIVE

## 2022-10-04 DIAGNOSIS — N76.0 ACUTE VAGINITIS: ICD-10-CM

## 2022-10-04 NOTE — PROGRESS NOTES
Patient was called back and states that the metrogel is working for her symptoms. Patient aware po medication not being sent at this time .

## 2022-10-04 NOTE — PROGRESS NOTES
Patient advised of MD reviewed labs and recommendations. Patient was sent metrogel on 9/28/2022 Do you still want the flagyl sent . Rx pended for amend. sign    Please advise    Thank you

## 2022-10-05 RX ORDER — METRONIDAZOLE 500 MG/1
500 TABLET ORAL 2 TIMES DAILY
Qty: 14 TABLET | Refills: 1 | Status: SHIPPED | OUTPATIENT
Start: 2022-10-05 | End: 2022-10-05

## 2022-10-05 RX ORDER — METRONIDAZOLE 500 MG/1
500 TABLET ORAL 2 TIMES DAILY
Qty: 14 TABLET | Refills: 0 | Status: SHIPPED | OUTPATIENT
Start: 2022-10-05 | End: 2022-10-12

## 2022-10-05 NOTE — PROGRESS NOTES
Prescription discontinued as per MD verbal order for one of the po flagyl orders and this nurse called the pharmacy to cancel the second order    Patient was sent a my chart message

## 2023-01-13 ENCOUNTER — PATIENT MESSAGE (OUTPATIENT)
Dept: OBGYN CLINIC | Age: 33
End: 2023-01-13

## 2023-01-18 RX ORDER — INSULIN GLARGINE 100 [IU]/ML
INJECTION, SOLUTION SUBCUTANEOUS
Qty: 15 ML | Refills: 2 | Status: SHIPPED | OUTPATIENT
Start: 2023-01-18

## 2023-01-26 NOTE — PROGRESS NOTES
Janith Severe is a 28 y.o. female presents for a problem visit. Chief Complaint   Patient presents with    Vaginitis     Patient's last menstrual period was 11/02/2022. Birth Control: none. Last Pap: abnormal obtained 3 year(s) ago. The patient is reporting having: Vaginal Discharge for 1  weeks. She reports the symptoms are is unchanged. Aggravating factors include none. And alleviating factors include none. 1. Have you been to the ER, urgent care clinic, or hospitalized since your last visit? [facility]     2. Have you seen or consulted any other health care providers outside of the [address] [place] [address] since your last visit? No    Examination chaperoned by Michael Zamudio LPN.

## 2023-01-27 ENCOUNTER — OFFICE VISIT (OUTPATIENT)
Dept: OBGYN CLINIC | Age: 33
End: 2023-01-27
Payer: MEDICAID

## 2023-01-27 VITALS — WEIGHT: 151.8 LBS | SYSTOLIC BLOOD PRESSURE: 100 MMHG | BODY MASS INDEX: 27.76 KG/M2 | DIASTOLIC BLOOD PRESSURE: 63 MMHG

## 2023-01-27 DIAGNOSIS — N39.0 URINARY TRACT INFECTION WITHOUT HEMATURIA, SITE UNSPECIFIED: ICD-10-CM

## 2023-01-27 DIAGNOSIS — N76.0 ACUTE VAGINITIS: Primary | ICD-10-CM

## 2023-01-27 DIAGNOSIS — A64 STD (FEMALE): ICD-10-CM

## 2023-01-27 LAB
BILIRUB UR QL STRIP: NEGATIVE
GLUCOSE UR-MCNC: NEGATIVE MG/DL
KETONES P FAST UR STRIP-MCNC: NEGATIVE MG/DL
PH UR STRIP: 6 [PH] (ref 4.6–8)
PROT UR QL STRIP: NORMAL
SP GR UR STRIP: 1 (ref 1–1.03)
UA UROBILINOGEN AMB POC: NORMAL (ref 0.2–1)
URINALYSIS CLARITY POC: CLEAR
URINALYSIS COLOR POC: NORMAL
URINE BLOOD POC: NEGATIVE
URINE LEUKOCYTES POC: NORMAL
URINE NITRITES POC: NEGATIVE

## 2023-01-27 PROCEDURE — 81002 URINALYSIS NONAUTO W/O SCOPE: CPT | Performed by: OBSTETRICS & GYNECOLOGY

## 2023-01-27 PROCEDURE — 99213 OFFICE O/P EST LOW 20 MIN: CPT | Performed by: OBSTETRICS & GYNECOLOGY

## 2023-01-27 NOTE — PROGRESS NOTES
Vaginitis evaluation    Chief Complaint   Vaginitis      HPI  28 y.o. female complains of white, green, and malodorous vaginal discharge for 7 days. Patient's last menstrual period was 2022. Birth Control: none. She denies additional symptoms at this time. The patient denies aggravating factors. She is not concerned about possible STI exposure at this time. She denies exposure to new chemicals ot hygenic agents  Previous treatment included: none  Last Pap: abnormal obtained 3 year(s) ago.   Past Medical History:   Diagnosis Date    Chlamydia 2019    per pt    COVID-19 2021    recovered at home    Diabetes mellitus ([facility] [address] 2012    Type 2 diabetes mellitus without complication, unspecified whether long term insulin use (HCC)    Hypercholesterolemia     Migraine     Mild depression 2012    Pap smear abnormality of cervix/human papillomavirus (HPV) positive 2020, 2011    Postpartum depression 2012    Preeclampsia     Thrombocytosis 3/1/2019    Trichomonas infection 2020    Vitamin D deficiency      Past Surgical History:   Procedure Laterality Date    HX  SECTION  ;2016    x2    [place]  DELIVERY ONLY      c/s for LGA      Social History     Occupational History    Not on file   Tobacco Use    Smoking status: Never    Smokeless tobacco: Never   Vaping Use    Vaping Use: Never used   Substance and Sexual Activity    Alcohol use: No    Drug use: No    Sexual activity: Yes     Partners: Male     Birth control/protection: None     Family History   Problem Relation Age of Onset    Diabetes Mother         Type II    Hypertension Mother     Coronary Art Dis Mother         stent placed    Diabetes Sister 25        Type II    Hypertension Sister     Coronary Art Dis Sister         stent placed    Kidney Disease Sister     Other Sister 34        Covid    Diabetes Brother     Dementia Maternal Grandfather         No Known Allergies  Prior to Admission medications    Medication Sig Start Date End Date Taking? Authorizing Provider   insulin glargine (Lantus Solostar U-100 Insulin) 100 unit/mL (3 mL) inpn INJECT 25 UNITS UNDER THE SKIN IN THE EVENING 1/18/23  Yes Oneyda Álvarez MD   ferrous sulfate 325 mg (65 mg iron) tablet Take 1 tablet on Monday, Wednesday, and Friday 5/26/22  Yes Oneyda Álvarez MD   insulin aspart U-100 (NovoLOG Flexpen U-100 Insulin) 100 unit/mL (3 mL) inpn INJECT 30 TO 35 UNITS THREE TIMES A DAY, BEFORE MEALS 5/12/22  Yes Oneyda Álvarez MD   omeprazole (PRILOSEC) 40 mg capsule Take 1 Capsule by mouth two (2) times a day. 12/17/21  Yes Donn Crowder MD   clomiPHENE (CLOMID) 50 mg tablet Take two tablets days 5-9 after menses start  Patient not taking: No sig reported 11/24/21   Ar Lyn MD   Insulin Syringe-Needle U-100 (BD Insulin Syringe Ultra-Fine) 0.3 mL 31 gauge x 5/16\" syrg USE 3 TIMES A DAY 11/11/21  Yes Kiel Espinal Daily A, PA   Blood-Glucose Meter (OneTouch UltraMini) monitoring kit Test blood glucose 4 times daily Dx Code: E11.65 11/10/21  Yes Kiel Espinal Daily A, PA   glucose blood VI test strips (ONETOUCH ULTRA TEST) strip Test blood glucose 4 daily Dx Code: E11.65 11/17/15  Yes Maldonado Johnson MD   Lancets misc Test blood glucose 4 times daily Dx Code: E11.65 11/17/15  Yes Maldonado Johnson MD   atorvastatin (LIPITOR) 40 mg tablet Take 1 Tablet by mouth daily. Patient not taking: No sig reported 5/26/22   Oneyda Álvarez MD   norgestimate-ethinyl estradioL ([address] [place] [address]) 0.25-35 mg-mcg tab Take 1 Tablet by mouth daily. Patient not taking: No sig reported 2/7/22   Ar Lyn MD   ondansetron [facility] ODT) 8 mg disintegrating tablet Take 1 Tablet by mouth every eight (8) hours as needed for Nausea or Vomiting. 12/17/21   Donn Crowder MD   medroxyPROGESTERone (PROVERA) 10 mg tablet Take 1 Tablet by mouth daily.   Patient not taking: No sig reported 5/24/21   Yazmin Simpson MD Review of Systems - History obtained from the patient  Constitutional: negative for weight loss, fever, night sweats  Breast: negative for breast lumps, nipple discharge, galactorrhea  GI: negative for change in bowel habits, abdominal pain, black or bloody stools  : negative for frequency, dysuria, hematuria  MSK: negative for back pain, joint pain, muscle pain  Skin: negative for itching, rash, hives  Neuro: negative for dizziness, headache, confusion, weakness  Psych: negative for anxiety, depression, change in mood  Heme/lymph: negative for bleeding, bruising, pallor       Objective:    Visit Vitals  /63   Wt 151 lb 12.8 oz (68.9 kg)   LMP 11/02/2022   BMI 27.76 kg/m²       Physical Exam:   PHYSICAL EXAMINATION    Constitutional  Appearance: well-nourished, well developed, alert, in no acute distress    HENT  Head and Face: appears normal    Genitourinary  External Genitalia: normal appearance for age, some discharge present, no tenderness present, no inflammatory lesions present, no masses present, no atrophy present  Vagina:  moderate tan discharge present, otherwise normal vaginal vault without central or paravaginal defects, no inflammatory lesions present, no masses present  Bladder: non-tender to palpation  Urethra: appears normal  Cervix: normal   Uterus: normal size, shape and consistency  Adnexa: no adnexal tenderness present, no adnexal masses present  Perineum: perineum within normal limits, no evidence of trauma, no rashes or skin lesions present  Anus: anus within normal limits, no hemorrhoids present  Inguinal Lymph Nodes: no lymphadenopathy present    Skin  General Inspection: no rash, no lesions identified    Neurologic/Psychiatric  Mental Status:  Orientation: grossly oriented to person, place and time  Mood and Affect: mood normal, affect appropriate        No results found for any visits on 01/27/23.     Assessment:   Likely vaginitis    Plan:   Treatment: per NS+    ROV prn if symptoms persist or worsen.

## 2023-01-29 LAB
A VAGINAE DNA VAG QL NAA+PROBE: NORMAL SCORE
BACTERIA UR CULT: ABNORMAL
BVAB2 DNA VAG QL NAA+PROBE: NORMAL SCORE
C ALBICANS DNA VAG QL NAA+PROBE: NEGATIVE
C GLABRATA DNA VAG QL NAA+PROBE: NEGATIVE
C TRACH DNA VAG QL NAA+PROBE: NEGATIVE
MEGA1 DNA VAG QL NAA+PROBE: NORMAL SCORE
N GONORRHOEA DNA VAG QL NAA+PROBE: NEGATIVE
SPECIMEN STATUS REPORT, ROLRST: NORMAL
T VAGINALIS DNA VAG QL NAA+PROBE: NEGATIVE

## 2023-01-30 DIAGNOSIS — N39.0 URINARY TRACT INFECTION WITHOUT HEMATURIA, SITE UNSPECIFIED: Primary | ICD-10-CM

## 2023-01-30 RX ORDER — NITROFURANTOIN 25; 75 MG/1; MG/1
100 CAPSULE ORAL 2 TIMES DAILY
Qty: 14 CAPSULE | Refills: 0 | Status: SHIPPED | OUTPATIENT
Start: 2023-01-30 | End: 2023-02-06

## 2023-02-13 ENCOUNTER — OFFICE VISIT (OUTPATIENT)
Dept: OBGYN CLINIC | Age: 33
End: 2023-02-13
Payer: MEDICAID

## 2023-02-13 VITALS — BODY MASS INDEX: 28.2 KG/M2 | SYSTOLIC BLOOD PRESSURE: 110 MMHG | DIASTOLIC BLOOD PRESSURE: 72 MMHG | WEIGHT: 154.2 LBS

## 2023-02-13 DIAGNOSIS — N93.8 DUB (DYSFUNCTIONAL UTERINE BLEEDING): Primary | ICD-10-CM

## 2023-02-13 PROCEDURE — 99213 OFFICE O/P EST LOW 20 MIN: CPT | Performed by: OBSTETRICS & GYNECOLOGY

## 2023-02-13 RX ORDER — MEDROXYPROGESTERONE ACETATE 10 MG/1
TABLET ORAL
Qty: 30 TABLET | Refills: 2 | Status: SHIPPED | OUTPATIENT
Start: 2023-02-13

## 2023-02-13 NOTE — PROGRESS NOTES
Abnormal bleeding note      Sue Merlos is a 28 y.o. female who complains of vaginal bleeding problems. Her current method of family planning is none. Seeing SGF but having trouble affording the planned evaluation. She developed this problem approximately 1 week. She has had vaginal bleeding which she describes as irregular lasting up to 5 days. Pad or tampon count: changes every 3 hours. Associated symptoms include cramping. Alleviating factors: none    Aggravating factors: none      The patient is sexually active.     Her relevant past medical history:   Past Medical History:   Diagnosis Date    Chlamydia 2019    per pt    COVID-19 2021    recovered at home    Diabetes mellitus ([facility] [address] 2012    Type 2 diabetes mellitus without complication, unspecified whether long term insulin use (HCC)    Hypercholesterolemia     Migraine     Mild depression 2012    Pap smear abnormality of cervix/human papillomavirus (HPV) positive 2020, 2011    Postpartum depression 2012    Preeclampsia     Thrombocytosis 3/1/2019    Trichomonas infection 2020    Vitamin D deficiency         Past Surgical History:   Procedure Laterality Date    HX  SECTION  ;2016    x2    [place]IVERY ONLY      c/s for LGA      Social History     Occupational History    Not on file   Tobacco Use    Smoking status: Never    Smokeless tobacco: Never   Vaping Use    Vaping Use: Never used   Substance and Sexual Activity    Alcohol use: No    Drug use: No    Sexual activity: Yes     Partners: Male     Birth control/protection: None     Family History   Problem Relation Age of Onset    Diabetes Mother         Type II    Hypertension Mother     Coronary Art Dis Mother         stent placed    Diabetes Sister 25        Type II    Hypertension Sister     Coronary Art Dis Sister         stent placed    Kidney Disease Sister     Other Sister 34        Covid    Diabetes Brother Dementia Maternal Grandfather        No Known Allergies  Prior to Admission medications    Medication Sig Start Date End Date Taking? Authorizing Provider   insulin glargine (Lantus Solostar U-100 Insulin) 100 unit/mL (3 mL) inpn INJECT 25 UNITS UNDER THE SKIN IN THE EVENING 1/18/23  Yes Masters, Tomie Goldberg, MD   ferrous sulfate 325 mg (65 mg iron) tablet Take 1 tablet on Monday, Wednesday, and Friday 5/26/22  Yes Masters, Tomie Goldberg, MD   insulin aspart U-100 (NovoLOG Flexpen U-100 Insulin) 100 unit/mL (3 mL) inpn INJECT 30 TO 35 UNITS THREE TIMES A DAY, BEFORE MEALS 5/12/22  Yes Masters, Tomie Goldberg, MD   omeprazole (PRILOSEC) 40 mg capsule Take 1 Capsule by mouth two (2) times a day. 12/17/21  Yes Vingesh Rubi MD   clomiPHENE (CLOMID) 50 mg tablet Take two tablets days 5-9 after menses start  Patient not taking: No sig reported 11/24/21   Fritz Moody MD   Insulin Syringe-Needle U-100 (BD Insulin Syringe Ultra-Fine) 0.3 mL 31 gauge x 5/16\" syrg USE 3 TIMES A DAY 11/11/21  Yes Rukhsana Espinal PA   Blood-Glucose Meter (OneTouch UltraMini) monitoring kit Test blood glucose 4 times daily Dx Code: E11.65 11/10/21  Yes Rukhsana Espinal PA   glucose blood VI test strips (ONETOUCH ULTRA TEST) strip Test blood glucose 4 daily Dx Code: E11.65 11/17/15  Yes Chintan Pillai MD   Lancets misc Test blood glucose 4 times daily Dx Code: E11.65 11/17/15  Yes Chintan Pillai MD   atorvastatin (LIPITOR) 40 mg tablet Take 1 Tablet by mouth daily. Patient not taking: No sig reported 5/26/22   Masters, Tomie Goldberg, MD   norgestimate-ethinyl estradioL ([address] [place] [address]) 0.25-35 mg-mcg tab Take 1 Tablet by mouth daily.   Patient not taking: No sig reported 2/7/22   Fritz Moody MD   ondansetron [facility] ODT) 8 mg disintegrating tablet Take 1 Tablet by mouth every eight (8) hours as needed for Nausea or Vomiting. 12/17/21   Vignesh Rubi MD   medroxyPROGESTERone (PROVERA) 10 mg tablet Take 1 Tablet by mouth daily.   Patient not taking: No sig reported 5/24/21   Sandrine Navarro MD        Review of Systems - History obtained from the patient  Constitutional: negative for weight loss, fever, night sweats  HEENT: negative for hearing loss, earache, congestion, snoring, sorethroat  CV: negative for chest pain, palpitations, edema  Resp: negative for cough, shortness of breath, wheezing  Breast: negative for breast lumps, nipple discharge, galactorrhea  GI: negative for change in bowel habits, abdominal pain, black or bloody stools  : negative for frequency, dysuria, hematuria  MSK: negative for back pain, joint pain, muscle pain  Skin: negative for itching, rash, hives  Neuro: negative for dizziness, headache, confusion, weakness  Psych: negative for anxiety, depression, change in mood  Heme/lymph: negative for bleeding, bruising, pallor      Objective:    Visit Vitals  /72   Wt 154 lb 3.2 oz (69.9 kg)   LMP 02/03/2023   BMI 28.20 kg/m²          PHYSICAL EXAMINATION    Constitutional  Appearance: well-nourished, well developed, alert, in no acute distress    HENT  Head and Face: appears normal    Gastrointestinal  Abdominal Examination: abdomen non-tender to palpation, normal bowel sounds, no masses present  Liver and spleen: no hepatomegaly present, spleen not palpable  Hernias: no hernias identified    Genitourinary  External Genitalia: normal appearance for age, no discharge present, no tenderness present, no inflammatory lesions present, no masses present, no atrophy present  Vagina: normal vaginal vault without central or paravaginal defects, no discharge present, no inflammatory lesions present, no masses present  Bladder: non-tender to palpation  Urethra: appears normal  Cervix: normal   Uterus: normal size, shape and consistency  Adnexa: no adnexal tenderness present, no adnexal masses present  Perineum: perineum within normal limits, no evidence of trauma, no rashes or skin lesions present  Anus: anus within normal limits, no hemorrhoids present  Inguinal Lymph Nodes: no lymphadenopathy present    Skin  General Inspection: no rash, no lesions identified    Neurologic/Psychiatric  Mental Status:  Orientation: grossly oriented to person, place and time  Mood and Affect: mood normal, affect appropriate    Assessment:   DUB bleeding. Plan:   Rx Provera 20 mg for 5 days each month. Instructions given to pt. Handouts given to pt.

## 2023-02-13 NOTE — PROGRESS NOTES
Alexis Ken is a 28 y.o. female presents for a problem visit. Chief Complaint   Patient presents with    Vaginal Bleeding     Patient's last menstrual period was 02/03/2023. Birth Control: none. Last Pap: abnormal obtained 3 year(s) ago. The patient is reporting having: Abnormal Bleeding for 1  week. She reports the symptoms are is unchanged. Aggravating factors include none. And alleviating factors include none. 1. Have you been to the ER, urgent care clinic, or hospitalized since your last visit? No    2. Have you seen or consulted any other health care providers outside of the [address] [place] [address] since your last visit? No    Examination chaperoned by Roselyn Singh LPN.

## 2023-02-19 VITALS
HEART RATE: 71 BPM | DIASTOLIC BLOOD PRESSURE: 74 MMHG | RESPIRATION RATE: 17 BRPM | BODY MASS INDEX: 28.17 KG/M2 | TEMPERATURE: 98 F | WEIGHT: 154 LBS | SYSTOLIC BLOOD PRESSURE: 114 MMHG | OXYGEN SATURATION: 98 %

## 2023-02-19 PROCEDURE — 96360 HYDRATION IV INFUSION INIT: CPT

## 2023-02-19 PROCEDURE — 99284 EMERGENCY DEPT VISIT MOD MDM: CPT

## 2023-02-20 ENCOUNTER — HOSPITAL ENCOUNTER (EMERGENCY)
Age: 33
Discharge: HOME | End: 2023-02-20
Attending: EMERGENCY MEDICINE
Payer: MEDICAID

## 2023-02-20 DIAGNOSIS — N93.9 ABNORMAL UTERINE BLEEDING: Primary | ICD-10-CM

## 2023-02-20 LAB
ALBUMIN SERPL-MCNC: 3.6 G/DL (ref 3.5–5)
ALBUMIN/GLOB SERPL: 0.9 (ref 1.1–2.2)
ALP SERPL-CCNC: 76 U/L (ref 45–117)
ALT SERPL-CCNC: 34 U/L (ref 12–78)
ANION GAP SERPL CALC-SCNC: 3 MMOL/L (ref 5–15)
APTT PPP: 25.4 SEC (ref 22.1–31)
AST SERPL-CCNC: 17 U/L (ref 15–37)
BASOPHILS # BLD: 0 K/UL (ref 0–0.1)
BASOPHILS NFR BLD: 1 % (ref 0–1)
BILIRUB SERPL-MCNC: 0.3 MG/DL (ref 0.2–1)
BUN SERPL-MCNC: 17 MG/DL (ref 6–20)
BUN/CREAT SERPL: 27 (ref 12–20)
CALCIUM SERPL-MCNC: 8.3 MG/DL (ref 8.5–10.1)
CHLORIDE SERPL-SCNC: 105 MMOL/L (ref 97–108)
CO2 SERPL-SCNC: 28 MMOL/L (ref 21–32)
COMMENT, HOLDF: NORMAL
CREAT SERPL-MCNC: 0.62 MG/DL (ref 0.55–1.02)
DIFFERENTIAL METHOD BLD: ABNORMAL
EOSINOPHIL # BLD: 0.2 K/UL (ref 0–0.4)
EOSINOPHIL NFR BLD: 2 % (ref 0–7)
ERYTHROCYTE [DISTWIDTH] IN BLOOD BY AUTOMATED COUNT: 13.2 % (ref 11.5–14.5)
GLOBULIN SER CALC-MCNC: 4 G/DL (ref 2–4)
GLUCOSE SERPL-MCNC: 227 MG/DL (ref 65–100)
HCG SERPL QL: NEGATIVE
HCT VFR BLD AUTO: 35.1 % (ref 35–47)
HGB BLD-MCNC: 11.4 G/DL (ref 11.5–16)
IMM GRANULOCYTES # BLD AUTO: 0 K/UL (ref 0–0.04)
IMM GRANULOCYTES NFR BLD AUTO: 0 % (ref 0–0.5)
INR PPP: 1 (ref 0.9–1.1)
LYMPHOCYTES # BLD: 3.7 K/UL (ref 0.8–3.5)
LYMPHOCYTES NFR BLD: 53 % (ref 12–49)
MCH RBC QN AUTO: 27.7 PG (ref 26–34)
MCHC RBC AUTO-ENTMCNC: 32.5 G/DL (ref 30–36.5)
MCV RBC AUTO: 85.4 FL (ref 80–99)
MONOCYTES # BLD: 0.4 K/UL (ref 0–1)
MONOCYTES NFR BLD: 6 % (ref 5–13)
NEUTS SEG # BLD: 2.6 K/UL (ref 1.8–8)
NEUTS SEG NFR BLD: 38 % (ref 32–75)
NRBC # BLD: 0 K/UL (ref 0–0.01)
NRBC BLD-RTO: 0 PER 100 WBC
PLATELET # BLD AUTO: 368 K/UL (ref 150–400)
PMV BLD AUTO: 9.3 FL (ref 8.9–12.9)
POTASSIUM SERPL-SCNC: 3.6 MMOL/L (ref 3.5–5.1)
PROT SERPL-MCNC: 7.6 G/DL (ref 6.4–8.2)
PROTHROMBIN TIME: 10.3 SEC (ref 9–11.1)
RBC # BLD AUTO: 4.11 M/UL (ref 3.8–5.2)
SAMPLES BEING HELD,HOLD: NORMAL
SODIUM SERPL-SCNC: 136 MMOL/L (ref 136–145)
THERAPEUTIC RANGE,PTTT: NORMAL SECS (ref 58–77)
WBC # BLD AUTO: 6.9 K/UL (ref 3.6–11)

## 2023-02-20 PROCEDURE — 85730 THROMBOPLASTIN TIME PARTIAL: CPT

## 2023-02-20 PROCEDURE — 36415 COLL VENOUS BLD VENIPUNCTURE: CPT

## 2023-02-20 PROCEDURE — 96360 HYDRATION IV INFUSION INIT: CPT

## 2023-02-20 PROCEDURE — 84703 CHORIONIC GONADOTROPIN ASSAY: CPT

## 2023-02-20 PROCEDURE — 74011250636 HC RX REV CODE- 250/636: Performed by: EMERGENCY MEDICINE

## 2023-02-20 PROCEDURE — 85025 COMPLETE CBC W/AUTO DIFF WBC: CPT

## 2023-02-20 PROCEDURE — 85610 PROTHROMBIN TIME: CPT

## 2023-02-20 PROCEDURE — 80053 COMPREHEN METABOLIC PANEL: CPT

## 2023-02-20 RX ADMIN — SODIUM CHLORIDE 1000 ML: 9 INJECTION, SOLUTION INTRAVENOUS at 00:32

## 2023-02-20 NOTE — ED PROVIDER NOTES
43-year-old female who presents to the ER with persistent vaginal bleeding for the past 2 weeks. The patient was accompanied pelvic cramps. Her last menstrual period was approximately 3 weeks ago. She denies any fever and chills, headache, nausea or vomiting, abdominal pain, diarrhea constipation, dysuria, dizziness, extremity weakness or numbness, sick contact, skin rash or recent travel of note, the patient was seen by her gynecologist and placed on Provera that she is refusing to take them because she is not sure whether this is her menstrual cycle or not.        Past Medical History:   Diagnosis Date    Chlamydia 2019    per pt    COVID-19 2021    recovered at home    Diabetes mellitus ([facility] [address] 2012    Type 2 diabetes mellitus without complication, unspecified whether long term insulin use (HCC)    Hypercholesterolemia     Migraine     Mild depression 2012    Pap smear abnormality of cervix/human papillomavirus (HPV) positive 2020, 2011    Postpartum depression 2012    Preeclampsia     Thrombocytosis 3/1/2019    Trichomonas infection 2020    Vitamin D deficiency        Past Surgical History:   Procedure Laterality Date    HX  SECTION  ;2016    x2    [place]  DELIVERY ONLY      c/s for LGA          Family History:   Problem Relation Age of Onset    Diabetes Mother         Type II    Hypertension Mother     Coronary Art Dis Mother         stent placed    Diabetes Sister 25        Type II    Hypertension Sister     Coronary Art Dis Sister         stent placed    Kidney Disease Sister     Other Sister 34        Covid    Diabetes Brother     Dementia Maternal Grandfather        Social History     Socioeconomic History    Marital status: SINGLE     Spouse name: Not on file    Number of children: Not on file    Years of education: Not on file    Highest education level: Not on file   Occupational History    Not on file   Tobacco Use    Smoking status: Never Smokeless tobacco: Never   Vaping Use    Vaping Use: Never used   Substance and Sexual Activity    Alcohol use: No    Drug use: No    Sexual activity: Yes     Partners: Male     Birth control/protection: None   Other Topics Concern    Not on file   Social History Narrative    Not on file     Social Determinants of Health     Financial Resource Strain: Not on file   Food Insecurity: Not on file   Transportation Needs: Not on file   Physical Activity: Not on file   Stress: Not on file   Social Connections: Not on file   Intimate Partner Violence: Not on file   Housing Stability: Not on file         ALLERGIES: Patient has no known allergies. Review of Systems   All other systems reviewed and are negative. Vitals:    02/19/23 2353   BP: 114/74   Pulse: 71   Resp: 17   Temp: 98 °F (36.7 °C)   SpO2: 98%   Weight: 69.9 kg (154 lb)            Physical Exam  Vitals and nursing note reviewed. Exam conducted with a chaperone present. CONSTITUTIONAL: Well-appearing; well-nourished; in no apparent distress  HEAD: Normocephalic; atraumatic  EYES: PERRL; EOM intact; conjunctiva and sclera are clear bilaterally. ENT: No rhinorrhea; normal pharynx with no tonsillar hypertrophy; mucous membranes pink/moist, no erythema, no exudate. NECK: Supple; non-tender; no cervical lymphadenopathy  CARD: Normal S1, S2; no murmurs, rubs, or gallops. Regular rate and rhythm. RESP: Normal respiratory effort; breath sounds clear and equal bilaterally; no wheezes, rhonchi, or rales. ABD: Normal bowel sounds; non-distended; non-tender; no palpable organomegaly, no masses, no bruits. Back Exam: Normal inspection; no vertebral point tenderness, no CVA tenderness. Normal range of motion. EXT: Normal ROM in all four extremities; non-tender to palpation; no swelling or deformity; distal pulses are normal, no edema. SKIN: Warm; dry; no rash.   NEURO:Alert and oriented x 3, coherent, CELIA-XII grossly intact, sensory and motor are non-focal.        Medical Decision Making  Assessment: 20-year-old female who presents to the ER for evaluation for dysfunctional uterine bleeding with anemia. She has a normal exam with stable vital signs and otherwise well. Plan: Lab/IV fluid/education, reassurance, symptomatic treatment/serial exams/ Monitor and Reevaluate. Amount and/or Complexity of Data Reviewed  Labs: ordered. Procedures      Progress Note:   Pt has been reexamined by Danni Kapadia MD. Pt is feeling much better. Symptoms have improved. The patient denies any other discomfort. She remained hemodynamically stable. All available results have been reviewed with pt and any available family. Pt understands sx, dx, and tx in ED. Care plan has been outlined and questions have been answered. Pt is ready to go home. Will send home on vaginal bleeding instruction. Resume previous medication as prescribed by your physician. . Outpatient referral with PCP and gynecologist for evaluation and further treatment as needed. Written by Danni Kapadia MD,1:33 AM    .   .

## 2023-02-20 NOTE — ED TRIAGE NOTES
Pt. States has been bleeding for two weeks was seen by [facility] doctor was given progesterone to take but did not take it. Pt. States had a short period on 2/3-2/5 then started bleeding again on 2/7 until now.

## 2023-02-20 NOTE — Clinical Note
[address]  [facility] EMERGENCY DEPT  [address] [id number] [phone]    Work/School Note    Date: 2/19/2023    To Whom It May concern:    Bryan Bermeo was seen and treated today in the emergency room by the following provider(s):  Attending Provider: Baljinder Shaikh MD.      Bryan Bermeo is excused from work/school on 02/20/23 and 02/21/23. She is medically clear to return to work/school on 2/22/2023.        Sincerely,          Judie Doss MD

## 2023-03-13 RX ORDER — CLOMIPHENE CITRATE 50 MG/1
TABLET ORAL
Qty: 10 TABLET | Refills: 2 | Status: SHIPPED | OUTPATIENT
Start: 2023-03-13

## 2023-03-14 ENCOUNTER — TELEPHONE (OUTPATIENT)
Dept: OBGYN CLINIC | Age: 33
End: 2023-03-14

## 2023-03-14 NOTE — TELEPHONE ENCOUNTER
Two patient identifiers used      28year old patient last seen in the office on 2/13/2023 for problem visit      Dr Arina Maier office calling to lab results that were to have been done at this office     This nurse checked and the labs were not done that had been requested

## 2023-03-16 RX ORDER — METRONIDAZOLE 7.5 MG/G
1 GEL VAGINAL
Qty: 70 G | Refills: 1 | Status: SHIPPED | OUTPATIENT
Start: 2023-03-16 | End: 2023-03-21

## 2023-03-31 ENCOUNTER — APPOINTMENT (OUTPATIENT)
Dept: CT IMAGING | Age: 33
End: 2023-03-31
Attending: EMERGENCY MEDICINE
Payer: MEDICAID

## 2023-03-31 ENCOUNTER — HOSPITAL ENCOUNTER (EMERGENCY)
Age: 33
Discharge: HOME | End: 2023-03-31
Attending: EMERGENCY MEDICINE
Payer: MEDICAID

## 2023-03-31 ENCOUNTER — APPOINTMENT (OUTPATIENT)
Dept: GENERAL RADIOLOGY | Age: 33
End: 2023-03-31
Attending: EMERGENCY MEDICINE
Payer: MEDICAID

## 2023-03-31 VITALS
HEART RATE: 89 BPM | TEMPERATURE: 98.5 F | SYSTOLIC BLOOD PRESSURE: 113 MMHG | WEIGHT: 154 LBS | RESPIRATION RATE: 15 BRPM | HEIGHT: 62 IN | DIASTOLIC BLOOD PRESSURE: 65 MMHG | OXYGEN SATURATION: 100 % | BODY MASS INDEX: 28.34 KG/M2

## 2023-03-31 DIAGNOSIS — N30.00 ACUTE CYSTITIS WITHOUT HEMATURIA: Primary | ICD-10-CM

## 2023-03-31 LAB
ALBUMIN SERPL-MCNC: 3.6 G/DL (ref 3.5–5)
ALBUMIN/GLOB SERPL: 0.8 (ref 1.1–2.2)
ALP SERPL-CCNC: 84 U/L (ref 45–117)
ALT SERPL-CCNC: 23 U/L (ref 12–78)
ANION GAP SERPL CALC-SCNC: 6 MMOL/L (ref 5–15)
APPEARANCE UR: CLEAR
AST SERPL-CCNC: 19 U/L (ref 15–37)
ATRIAL RATE: 84 BPM
BACTERIA URNS QL MICRO: ABNORMAL /HPF
BASOPHILS # BLD: 0.1 K/UL (ref 0–0.1)
BASOPHILS NFR BLD: 1 % (ref 0–1)
BILIRUB SERPL-MCNC: 0.4 MG/DL (ref 0.2–1)
BILIRUB UR QL: NEGATIVE
BUN SERPL-MCNC: 12 MG/DL (ref 6–20)
BUN/CREAT SERPL: 15 (ref 12–20)
CALCIUM SERPL-MCNC: 8.8 MG/DL (ref 8.5–10.1)
CALCULATED P AXIS, ECG09: 72 DEGREES
CALCULATED R AXIS, ECG10: -174 DEGREES
CALCULATED T AXIS, ECG11: 55 DEGREES
CHLORIDE SERPL-SCNC: 102 MMOL/L (ref 97–108)
CO2 SERPL-SCNC: 27 MMOL/L (ref 21–32)
COLOR UR: YELLOW
COMMENT, HOLDF: NORMAL
CREAT SERPL-MCNC: 0.78 MG/DL (ref 0.55–1.02)
D DIMER PPP FEU-MCNC: 0.74 MG/L FEU (ref 0–0.65)
DIAGNOSIS, 93000: NORMAL
DIFFERENTIAL METHOD BLD: NORMAL
EOSINOPHIL # BLD: 0.2 K/UL (ref 0–0.4)
EOSINOPHIL NFR BLD: 2 % (ref 0–7)
EPITH CASTS URNS QL MICRO: ABNORMAL /LPF
ERYTHROCYTE [DISTWIDTH] IN BLOOD BY AUTOMATED COUNT: 13.1 % (ref 11.5–14.5)
GLOBULIN SER CALC-MCNC: 4.4 G/DL (ref 2–4)
GLUCOSE SERPL-MCNC: 245 MG/DL (ref 65–100)
GLUCOSE UR STRIP.AUTO-MCNC: 500 MG/DL
HCG UR QL: NEGATIVE
HCT VFR BLD AUTO: 39.8 % (ref 35–47)
HGB BLD-MCNC: 12.7 G/DL (ref 11.5–16)
HGB UR QL STRIP: NEGATIVE
HYALINE CASTS URNS QL MICRO: ABNORMAL /LPF (ref 0–2)
IMM GRANULOCYTES # BLD AUTO: 0 K/UL (ref 0–0.04)
IMM GRANULOCYTES NFR BLD AUTO: 0 % (ref 0–0.5)
KETONES UR QL STRIP.AUTO: 15 MG/DL
LEUKOCYTE ESTERASE UR QL STRIP.AUTO: ABNORMAL
LYMPHOCYTES # BLD: 2.7 K/UL (ref 0.8–3.5)
LYMPHOCYTES NFR BLD: 30 % (ref 12–49)
MCH RBC QN AUTO: 28.2 PG (ref 26–34)
MCHC RBC AUTO-ENTMCNC: 31.9 G/DL (ref 30–36.5)
MCV RBC AUTO: 88.4 FL (ref 80–99)
MONOCYTES # BLD: 0.8 K/UL (ref 0–1)
MONOCYTES NFR BLD: 9 % (ref 5–13)
NEUTS SEG # BLD: 5.4 K/UL (ref 1.8–8)
NEUTS SEG NFR BLD: 58 % (ref 32–75)
NITRITE UR QL STRIP.AUTO: NEGATIVE
NRBC # BLD: 0 K/UL (ref 0–0.01)
NRBC BLD-RTO: 0 PER 100 WBC
P-R INTERVAL, ECG05: 150 MS
PH UR STRIP: 8 (ref 5–8)
PLATELET # BLD AUTO: 375 K/UL (ref 150–400)
PMV BLD AUTO: 9.7 FL (ref 8.9–12.9)
POTASSIUM SERPL-SCNC: 4.2 MMOL/L (ref 3.5–5.1)
PROT SERPL-MCNC: 8 G/DL (ref 6.4–8.2)
PROT UR STRIP-MCNC: NEGATIVE MG/DL
Q-T INTERVAL, ECG07: 372 MS
QRS DURATION, ECG06: 86 MS
QTC CALCULATION (BEZET), ECG08: 439 MS
RBC # BLD AUTO: 4.5 M/UL (ref 3.8–5.2)
RBC #/AREA URNS HPF: ABNORMAL /HPF (ref 0–5)
SAMPLES BEING HELD,HOLD: NORMAL
SARS-COV-2 RDRP RESP QL NAA+PROBE: NOT DETECTED
SODIUM SERPL-SCNC: 135 MMOL/L (ref 136–145)
SOURCE, COVRS: NORMAL
SP GR UR REFRACTOMETRY: 1.02 (ref 1–1.03)
TROPONIN I SERPL HS-MCNC: <4 NG/L (ref 0–51)
UROBILINOGEN UR QL STRIP.AUTO: 1 EU/DL (ref 0.2–1)
VENTRICULAR RATE, ECG03: 84 BPM
WBC # BLD AUTO: 9.1 K/UL (ref 3.6–11)
WBC URNS QL MICRO: ABNORMAL /HPF (ref 0–4)

## 2023-03-31 PROCEDURE — 36415 COLL VENOUS BLD VENIPUNCTURE: CPT

## 2023-03-31 PROCEDURE — 84484 ASSAY OF TROPONIN QUANT: CPT

## 2023-03-31 PROCEDURE — 93005 ELECTROCARDIOGRAM TRACING: CPT

## 2023-03-31 PROCEDURE — 74011000636 HC RX REV CODE- 636: Performed by: EMERGENCY MEDICINE

## 2023-03-31 PROCEDURE — 80053 COMPREHEN METABOLIC PANEL: CPT

## 2023-03-31 PROCEDURE — 71046 X-RAY EXAM CHEST 2 VIEWS: CPT

## 2023-03-31 PROCEDURE — 74011000250 HC RX REV CODE- 250: Performed by: EMERGENCY MEDICINE

## 2023-03-31 PROCEDURE — 87635 SARS-COV-2 COVID-19 AMP PRB: CPT

## 2023-03-31 PROCEDURE — 85025 COMPLETE CBC W/AUTO DIFF WBC: CPT

## 2023-03-31 PROCEDURE — 99285 EMERGENCY DEPT VISIT HI MDM: CPT

## 2023-03-31 PROCEDURE — 81001 URINALYSIS AUTO W/SCOPE: CPT

## 2023-03-31 PROCEDURE — 71275 CT ANGIOGRAPHY CHEST: CPT

## 2023-03-31 PROCEDURE — 85379 FIBRIN DEGRADATION QUANT: CPT

## 2023-03-31 PROCEDURE — 87086 URINE CULTURE/COLONY COUNT: CPT

## 2023-03-31 PROCEDURE — 96375 TX/PRO/DX INJ NEW DRUG ADDON: CPT

## 2023-03-31 PROCEDURE — 96374 THER/PROPH/DIAG INJ IV PUSH: CPT

## 2023-03-31 PROCEDURE — 81025 URINE PREGNANCY TEST: CPT

## 2023-03-31 PROCEDURE — 74011250636 HC RX REV CODE- 250/636: Performed by: EMERGENCY MEDICINE

## 2023-03-31 RX ORDER — CEPHALEXIN 500 MG/1
500 CAPSULE ORAL 2 TIMES DAILY
Qty: 14 CAPSULE | Refills: 0 | Status: SHIPPED | OUTPATIENT
Start: 2023-03-31 | End: 2023-03-31 | Stop reason: SDUPTHER

## 2023-03-31 RX ORDER — KETOROLAC TROMETHAMINE 30 MG/ML
15 INJECTION, SOLUTION INTRAMUSCULAR; INTRAVENOUS ONCE
Status: COMPLETED | OUTPATIENT
Start: 2023-03-31 | End: 2023-03-31

## 2023-03-31 RX ORDER — CEPHALEXIN 500 MG/1
500 CAPSULE ORAL 2 TIMES DAILY
Qty: 14 CAPSULE | Refills: 0 | Status: SHIPPED | OUTPATIENT
Start: 2023-03-31 | End: 2023-04-07

## 2023-03-31 RX ADMIN — SODIUM CHLORIDE, PRESERVATIVE FREE 1 G: 5 INJECTION INTRAVENOUS at 17:50

## 2023-03-31 RX ADMIN — KETOROLAC TROMETHAMINE 15 MG: 30 INJECTION, SOLUTION INTRAMUSCULAR at 15:17

## 2023-03-31 RX ADMIN — IOPAMIDOL 100 ML: 755 INJECTION, SOLUTION INTRAVENOUS at 17:11

## 2023-03-31 RX ADMIN — SODIUM CHLORIDE 1000 ML: 9 INJECTION, SOLUTION INTRAVENOUS at 17:51

## 2023-03-31 NOTE — Clinical Note
[address]  [facility] EMERGENCY DEPT  [address] [phone]  [phone]    Work/School Note    Date: 3/31/2023    To Whom It May concern:    Sue Merlos was seen and treated today in the emergency room by the following provider(s):  Attending Provider: Eliecer Hooker MD.      Sue Merlos is excused from work/school on 03/31/23 and 04/01/23. She is medically clear to return to work/school on 4/2/2023.        Sincerely,          Patricia Solano MD

## 2023-03-31 NOTE — DISCHARGE INSTRUCTIONS
You were seen in the emergency department for sore throat, chest and back pain. The results of your tests were consistent with a UTI. Please take any medications prescribed at this visit as instructed. Please follow-up with your PCP or return to the emergency department if you experience a worsening of symptoms or any new symptoms that are concerning to you. Also your EKG had nonspecific changes so please follow-up with a cardiologist to review these.

## 2023-03-31 NOTE — Clinical Note
[address]  [facility] EMERGENCY DEPT  [address] [phone]  [phone]    Work/School Note    Date: 3/31/2023    To Whom It May concern:    Geoff Leak was seen and treated today in the emergency room by the following provider(s):  Attending Provider: Nora Naylor MD.      Geoff Molina is excused from work/school on 3/31/2023 through 4/2/2023. She is medically clear to return to work/school on 4/3/2023.          Sincerely,          Autumn Nugent MD

## 2023-03-31 NOTE — ED TRIAGE NOTES
[facility] a [facility] -). Last night she began to have sob and chest pain that radiates into her back. Reports some constipation, last good BM a few days ago.

## 2023-03-31 NOTE — ED PROVIDER NOTES
80-year-old female with PMHx of DM, migraine, depression presents to the emergency department with flulike symptoms, including sore throat, headache, generalized myalgias since yesterday. Patient [facility]ab which was negative. She reports that last night she was lying in bed and began to have dyspnea with associated chest pain that radiates into the back, which is ongoing. She has no additional complaints at this time. The history is provided by the patient. Sore Throat   Associated symptoms include shortness of breath and cough. Cough  Associated symptoms include chest pain, sore throat and shortness of breath. Chest Pain (Angina)   Associated symptoms include cough and shortness of breath.       Past Medical History:   Diagnosis Date    Chlamydia 2019    per pt    COVID-19 2021    recovered at home    Diabetes mellitus ([facility] [address] 2012    Type 2 diabetes mellitus without complication, unspecified whether long term insulin use (HCC)    Hypercholesterolemia     Migraine     Mild depression 2012    Pap smear abnormality of cervix/human papillomavirus (HPV) positive 2020, 2011    Postpartum depression 2012    Preeclampsia     Thrombocytosis 3/1/2019    Trichomonas infection 2020    Vitamin D deficiency        Past Surgical History:   Procedure Laterality Date    HX  SECTION  ;2016    x2    MD  DELIVERY ONLY      c/s for LGA          Family History:   Problem Relation Age of Onset    Diabetes Mother         Type II    Hypertension Mother     Coronary Art Dis Mother         stent placed    Diabetes Sister 25        Type II    Hypertension Sister     Coronary Art Dis Sister         stent placed    Kidney Disease Sister     Other Sister 34        Covid    Diabetes Brother     Dementia Maternal Grandfather        Social History     Socioeconomic History    Marital status: SINGLE     Spouse name: Not on file    Number of children: Not on file    Years of education: Not on file    Highest education level: Not on file   Occupational History    Not on file   Tobacco Use    Smoking status: Never    Smokeless tobacco: Never   Vaping Use    Vaping Use: Never used   Substance and Sexual Activity    Alcohol use: No    Drug use: No    Sexual activity: Yes     Partners: Male     Birth control/protection: None   Other Topics Concern    Not on file   Social History Narrative    Not on file     Social Determinants of Health     Financial Resource Strain: Not on file   Food Insecurity: Not on file   Transportation Needs: Not on file   Physical Activity: Not on file   Stress: Not on file   Social Connections: Not on file   Intimate Partner Violence: Not on file   Housing Stability: Not on file         ALLERGIES: Patient has no known allergies. Review of Systems   Constitutional: Negative. HENT:  Positive for sore throat. Eyes: Negative. Respiratory:  Positive for cough and shortness of breath. Cardiovascular:  Positive for chest pain. Gastrointestinal: Negative. Endocrine: Negative. Genitourinary: Negative. Musculoskeletal: Negative. Skin: Negative. Neurological: Negative. Hematological: Negative. Psychiatric/Behavioral: Negative. Vitals:    03/31/23 1501   BP: 113/65   Pulse: 89   Resp: 15   Temp: 98.5 °F (36.9 °C)   SpO2: 100%   Weight: 69.9 kg (154 lb)   Height: 5' 2\" (1.575 m)            Physical Exam  Vitals and nursing note reviewed. Constitutional:       General: She is not in acute distress. Appearance: She is ill-appearing and diaphoretic. She is not toxic-appearing. Comments: Uncomfortable appearing   HENT:      Head: Normocephalic and atraumatic. Nose: Nose normal.      Mouth/Throat:      Mouth: Mucous membranes are moist.   Cardiovascular:      Rate and Rhythm: Normal rate.    Pulmonary:      Effort: Pulmonary effort is normal. No respiratory distress. Musculoskeletal:      Cervical back: Normal range of motion. Skin:     General: Skin is warm. Neurological:      General: No focal deficit present. Mental Status: She is alert and oriented to person, place, and time. Psychiatric:         Mood and Affect: Mood normal.        Medical Decision Making  DDx: Viral URI, COVID, influenza, UTI    Plan:  - Labs: COVID, CBC, CMP, UA, troponin, D-dimer  - Imaging: CXR  - Medications: Ketorolac    Reassessment: Patient's work-up is notable for a urinalysis consistent with UTI. She also has an elevated D-dimer so we will order a CTA of the chest to look for PE. While she is undergoing this exam will order her dose of ceftriaxone here. She otherwise states that she feels tired but better overall. Reassessment: Patient CTA of the chest is negative for PE. We will provide her with a prescription for cephalexin and discharge with recommendation to follow-up with her PCP. I reviewed her EKG findings with her. Although the changes are nonspecific, she is concerned about the so will refer to cardiology for further discussion. She also requests a COVID swab before she leaves. Will perform this and discharge. Amount and/or Complexity of Data Reviewed  Labs: ordered. Radiology: ordered. ECG/medicine tests: ordered. Risk  Prescription drug management. ED Course as of 03/31/23 1847   Fri Mar 31, 2023   1700 This EKG was interpreted by me at 3:00. Normal sinus rhythm at 84 bpm.  Right axis deviation. Poor R wave progression.   No other significant ST segment abnormalities [JT]      ED Course User Index  [JT] Armaan Patterson MD       Procedures

## 2023-04-01 LAB
BACTERIA SPEC CULT: NORMAL
SERVICE CMNT-IMP: NORMAL

## 2023-04-17 ENCOUNTER — PATIENT MESSAGE (OUTPATIENT)
Dept: OBGYN CLINIC | Age: 33
End: 2023-04-17

## 2023-04-17 ENCOUNTER — TELEPHONE (OUTPATIENT)
Dept: OBGYN CLINIC | Age: 33
End: 2023-04-17

## 2023-04-17 NOTE — TELEPHONE ENCOUNTER
Two patient identifiers used    28year old patient of [address] [place] [address] Dr Padgett last seen in the office on 2/13/2023 for problem visit    Patient has history of uti and was seen in the er on 3/31/2023 here at [facility] for uti and was given cephALEXin (Keflex) 500 mg capsule completed that and 4/7/2023 got her cycle and now her cycle is finishing and she is beginning to have urgency and feeling off when she voids    Patient is wondering if she could come in to leave a urine for culture before taking a second round of keflex( some how she got two )       Please advise    Thank you

## 2023-04-17 NOTE — TELEPHONE ENCOUNTER
Reno Myers MD  to [facility]     1:49 PM  yes     Patient advised of work in MD , Dr. Douglas Salomon, recommendations and patient was placed on the schedule to be seen tomorrow for lab only to leave a urine for culture    Patient verbalized understanding.

## 2023-04-19 RX ORDER — INSULIN GLARGINE 100 [IU]/ML
INJECTION, SOLUTION SUBCUTANEOUS
Qty: 15 ML | Refills: 4 | Status: SHIPPED | OUTPATIENT
Start: 2023-04-19

## 2023-05-30 ENCOUNTER — HOSPITAL ENCOUNTER (EMERGENCY)
Facility: HOSPITAL | Age: 33
Discharge: HOME | End: 2023-05-30
Attending: EMERGENCY MEDICINE
Payer: MEDICAID

## 2023-05-30 VITALS
HEIGHT: 63 IN | DIASTOLIC BLOOD PRESSURE: 65 MMHG | HEART RATE: 63 BPM | RESPIRATION RATE: 16 BRPM | OXYGEN SATURATION: 100 % | SYSTOLIC BLOOD PRESSURE: 103 MMHG | BODY MASS INDEX: 26.58 KG/M2 | WEIGHT: 150 LBS | TEMPERATURE: 98.4 F

## 2023-05-30 DIAGNOSIS — R51.9 ACUTE NONINTRACTABLE HEADACHE, UNSPECIFIED HEADACHE TYPE: Primary | ICD-10-CM

## 2023-05-30 LAB
ALBUMIN SERPL-MCNC: 3.5 G/DL (ref 3.5–5)
ALBUMIN/GLOB SERPL: 1 (ref 1.1–2.2)
ALP SERPL-CCNC: 74 U/L (ref 45–117)
ALT SERPL-CCNC: 23 U/L (ref 12–78)
ANION GAP SERPL CALC-SCNC: 4 MMOL/L (ref 5–15)
APPEARANCE UR: CLEAR
AST SERPL-CCNC: 17 U/L (ref 15–37)
BACTERIA URNS QL MICRO: NEGATIVE /HPF
BASOPHILS # BLD: 0 K/UL (ref 0–0.1)
BASOPHILS NFR BLD: 0 % (ref 0–1)
BILIRUB SERPL-MCNC: 0.4 MG/DL (ref 0.2–1)
BILIRUB UR QL: NEGATIVE
BUN SERPL-MCNC: 9 MG/DL (ref 6–20)
BUN/CREAT SERPL: 14 (ref 12–20)
CALCIUM SERPL-MCNC: 8.8 MG/DL (ref 8.5–10.1)
CHLORIDE SERPL-SCNC: 108 MMOL/L (ref 97–108)
CO2 SERPL-SCNC: 29 MMOL/L (ref 21–32)
COLOR UR: ABNORMAL
CREAT SERPL-MCNC: 0.65 MG/DL (ref 0.55–1.02)
DIFFERENTIAL METHOD BLD: ABNORMAL
EOSINOPHIL # BLD: 0 K/UL (ref 0–0.4)
EOSINOPHIL NFR BLD: 0 % (ref 0–7)
EPITH CASTS URNS QL MICRO: ABNORMAL /LPF
ERYTHROCYTE [DISTWIDTH] IN BLOOD BY AUTOMATED COUNT: 12.4 % (ref 11.5–14.5)
GLOBULIN SER CALC-MCNC: 3.6 G/DL (ref 2–4)
GLUCOSE SERPL-MCNC: 104 MG/DL (ref 65–100)
GLUCOSE UR STRIP.AUTO-MCNC: NEGATIVE MG/DL
HCG UR QL: NEGATIVE
HCT VFR BLD AUTO: 36.5 % (ref 35–47)
HGB BLD-MCNC: 12 G/DL (ref 11.5–16)
HGB UR QL STRIP: NEGATIVE
HYALINE CASTS URNS QL MICRO: ABNORMAL /LPF (ref 0–2)
IMM GRANULOCYTES # BLD AUTO: 0 K/UL
IMM GRANULOCYTES NFR BLD AUTO: 0 %
KETONES UR QL STRIP.AUTO: NEGATIVE MG/DL
LEUKOCYTE ESTERASE UR QL STRIP.AUTO: ABNORMAL
LYMPHOCYTES # BLD: 5.1 K/UL (ref 0.8–3.5)
LYMPHOCYTES NFR BLD: 64 % (ref 12–49)
MAGNESIUM SERPL-MCNC: 1.8 MG/DL (ref 1.6–2.4)
MCH RBC QN AUTO: 27.5 PG (ref 26–34)
MCHC RBC AUTO-ENTMCNC: 32.9 G/DL (ref 30–36.5)
MCV RBC AUTO: 83.7 FL (ref 80–99)
MONOCYTES # BLD: 0.5 K/UL (ref 0–1)
MONOCYTES NFR BLD: 6 % (ref 5–13)
NEUTS SEG # BLD: 2.4 K/UL (ref 1.8–8)
NEUTS SEG NFR BLD: 30 % (ref 32–75)
NITRITE UR QL STRIP.AUTO: NEGATIVE
NRBC # BLD: 0 K/UL (ref 0–0.01)
NRBC BLD-RTO: 0 PER 100 WBC
PH UR STRIP: 7 (ref 5–8)
PLATELET # BLD AUTO: 392 K/UL (ref 150–400)
PMV BLD AUTO: 9.5 FL (ref 8.9–12.9)
POTASSIUM SERPL-SCNC: 3.9 MMOL/L (ref 3.5–5.1)
PROT SERPL-MCNC: 7.1 G/DL (ref 6.4–8.2)
PROT UR STRIP-MCNC: NEGATIVE MG/DL
RBC # BLD AUTO: 4.36 M/UL (ref 3.8–5.2)
RBC #/AREA URNS HPF: ABNORMAL /HPF (ref 0–5)
RBC MORPH BLD: ABNORMAL
SODIUM SERPL-SCNC: 141 MMOL/L (ref 136–145)
SP GR UR REFRACTOMETRY: 1.01 (ref 1–1.03)
SPECIMEN HOLD: NORMAL
UROBILINOGEN UR QL STRIP.AUTO: 0.2 EU/DL (ref 0.2–1)
WBC # BLD AUTO: 8 K/UL (ref 3.6–11)
WBC MORPH BLD: ABNORMAL
WBC URNS QL MICRO: ABNORMAL /HPF (ref 0–4)

## 2023-05-30 PROCEDURE — 36415 COLL VENOUS BLD VENIPUNCTURE: CPT

## 2023-05-30 PROCEDURE — 85025 COMPLETE CBC W/AUTO DIFF WBC: CPT

## 2023-05-30 PROCEDURE — 83735 ASSAY OF MAGNESIUM: CPT

## 2023-05-30 PROCEDURE — 96374 THER/PROPH/DIAG INJ IV PUSH: CPT

## 2023-05-30 PROCEDURE — 80053 COMPREHEN METABOLIC PANEL: CPT

## 2023-05-30 PROCEDURE — 81025 URINE PREGNANCY TEST: CPT

## 2023-05-30 PROCEDURE — 81001 URINALYSIS AUTO W/SCOPE: CPT

## 2023-05-30 PROCEDURE — 99284 EMERGENCY DEPT VISIT MOD MDM: CPT

## 2023-05-30 PROCEDURE — 2580000003 HC RX 258: Performed by: EMERGENCY MEDICINE

## 2023-05-30 PROCEDURE — 6360000002 HC RX W HCPCS: Performed by: EMERGENCY MEDICINE

## 2023-05-30 PROCEDURE — 96375 TX/PRO/DX INJ NEW DRUG ADDON: CPT

## 2023-05-30 RX ORDER — 0.9 % SODIUM CHLORIDE 0.9 %
1000 INTRAVENOUS SOLUTION INTRAVENOUS ONCE
Status: COMPLETED | OUTPATIENT
Start: 2023-05-30 | End: 2023-05-30

## 2023-05-30 RX ORDER — PROCHLORPERAZINE EDISYLATE 5 MG/ML
10 INJECTION INTRAMUSCULAR; INTRAVENOUS ONCE
Status: COMPLETED | OUTPATIENT
Start: 2023-05-30 | End: 2023-05-30

## 2023-05-30 RX ORDER — DIPHENHYDRAMINE HYDROCHLORIDE 50 MG/ML
12.5 INJECTION INTRAMUSCULAR; INTRAVENOUS
Status: COMPLETED | OUTPATIENT
Start: 2023-05-30 | End: 2023-05-30

## 2023-05-30 RX ADMIN — PROCHLORPERAZINE EDISYLATE 10 MG: 5 INJECTION INTRAMUSCULAR; INTRAVENOUS at 18:31

## 2023-05-30 RX ADMIN — SODIUM CHLORIDE 1000 ML: 9 INJECTION, SOLUTION INTRAVENOUS at 18:32

## 2023-05-30 RX ADMIN — DIPHENHYDRAMINE HYDROCHLORIDE 12.5 MG: 50 INJECTION, SOLUTION INTRAMUSCULAR; INTRAVENOUS at 18:30

## 2023-05-30 ASSESSMENT — ENCOUNTER SYMPTOMS
BACK PAIN: 0
COUGH: 0
ABDOMINAL PAIN: 0
NAUSEA: 1
SORE THROAT: 0

## 2023-05-30 ASSESSMENT — PAIN SCALES - GENERAL: PAINLEVEL_OUTOF10: 8

## 2023-05-30 ASSESSMENT — PAIN - FUNCTIONAL ASSESSMENT: PAIN_FUNCTIONAL_ASSESSMENT: 0-10

## 2023-05-30 NOTE — ED PROVIDER NOTES
[facility] EMERGENCY DEPT  EMERGENCY DEPARTMENT ENCOUNTER      Pt Name: Khang Wong  MRN: [id number]  [place] 1990  Date of evaluation: 2023  Provider: Dionne Damon MD    CHIEF COMPLAINT       Chief Complaint   Patient presents with    Migraine         HISTORY OF PRESENT ILLNESS    Khang Wong is a 36 yo F with h/o migranes, diabetes and postpartum depression who has had headaches, nausea and generalized body aches sine yesterday. She denies fever or chills. Additional history from independent historians:     Review of External Medical Records:     Nursing Notes were reviewed. REVIEW OF SYSTEMS       Review of Systems   Constitutional:  Negative for fever. HENT:  Negative for sore throat. Eyes:  Negative for visual disturbance. Respiratory:  Negative for cough. Cardiovascular:  Negative for chest pain. Gastrointestinal:  Positive for nausea. Negative for abdominal pain. Genitourinary:  Negative for dysuria. Musculoskeletal:  Positive for myalgias. Negative for back pain. Skin:  Negative for rash. Neurological:  Positive for headaches. Except as noted above the remainder of the review of systems was reviewed and negative.        PAST MEDICAL HISTORY     Past Medical History:   Diagnosis Date    Chlamydia 2019    per pt    COVID-19 2021    recovered at home    Diabetes mellitus ([facility] [address] 2012    Type 2 diabetes mellitus without complication, unspecified whether long term insulin use ([address]    Hypercholesterolemia     Migraine     Mild depression 2012    Pap smear abnormality of cervix/human papillomavirus (HPV) positive 2020, 2011    Postpartum depression 2012    Preeclampsia     Thrombocytosis 3/1/2019    Trichomonas infection 2020    Vitamin D deficiency          SURGICAL HISTORY       Past Surgical History:   Procedure Laterality Date     DELIVERY ONLY      c/s for [facility]      SECTION  ;2016    x2

## 2023-05-30 NOTE — ED TRIAGE NOTES
Pt reports headache with generalized body aches starting yesterday. Denies fever at home. Hx of migraines. Reports nausea.

## 2023-05-31 NOTE — ED NOTES
Provider was updated on pt's condition, pt reported dizziness and fatigue.       Negar Romo RN  05/30/23 2001

## 2023-09-06 ENCOUNTER — OFFICE VISIT (OUTPATIENT)
Age: 33
End: 2023-09-06
Payer: MEDICAID

## 2023-09-06 VITALS — SYSTOLIC BLOOD PRESSURE: 105 MMHG | DIASTOLIC BLOOD PRESSURE: 65 MMHG | WEIGHT: 150.8 LBS | BODY MASS INDEX: 26.71 KG/M2

## 2023-09-06 DIAGNOSIS — N93.8 DUB (DYSFUNCTIONAL UTERINE BLEEDING): Primary | ICD-10-CM

## 2023-09-06 PROCEDURE — 99212 OFFICE O/P EST SF 10 MIN: CPT | Performed by: OBSTETRICS & GYNECOLOGY

## 2023-09-06 RX ORDER — LETROZOLE 2.5 MG/1
2.5 TABLET, FILM COATED ORAL DAILY
Qty: 30 TABLET | Refills: 3 | Status: SHIPPED | OUTPATIENT
Start: 2023-09-06

## 2023-09-06 NOTE — PROGRESS NOTES
No LMP recorded. Birth Control: NA  Last Pap: abnormal obtained 3 year(s) ago. The patient is reporting having:  follow up. Pt still bleeding after intercourse and she also have questions about the use of clomid VS letrozole  Is still trying to get pregnant. Clomid is expensive and has heard that letrozole is cheaper and just as good. She reports the symptoms are is unchanged. Aggravating factors include none. And alleviating factors include none. Advised switching to Letrozole is no problem. Rx sent    I spent 10 minutes with the patient, more than half of which was face to face counseling.

## 2023-09-06 NOTE — PROGRESS NOTES
Valentin Brown is a 35 y.o. female presents for a problem visit. No chief complaint on file. No LMP recorded. Birth Control: OCP (estrogen/progesterone). Last Pap: abnormal obtained 3 year(s) ago. The patient is reporting having:  follow up . Pt still bleed after intercourse and she also have questions about the use of  clomid VS letrozole  She reports the symptoms are is unchanged. Aggravating factors include none. And alleviating factors include none. 1. Have you been to the ER, urgent care clinic, or hospitalized since your last visit? No    2. Have you seen or consulted any other health care providers outside of the [address] [place] [address] since your last visit? No    Examination chaperoned by Mary Osei LPN.

## 2023-10-08 ENCOUNTER — APPOINTMENT (OUTPATIENT)
Facility: HOSPITAL | Age: 33
End: 2023-10-08
Payer: MEDICAID

## 2023-10-08 ENCOUNTER — HOSPITAL ENCOUNTER (EMERGENCY)
Facility: HOSPITAL | Age: 33
Discharge: HOME | End: 2023-10-08
Attending: EMERGENCY MEDICINE
Payer: MEDICAID

## 2023-10-08 VITALS
HEIGHT: 63 IN | BODY MASS INDEX: 26.58 KG/M2 | WEIGHT: 150 LBS | HEART RATE: 80 BPM | SYSTOLIC BLOOD PRESSURE: 117 MMHG | DIASTOLIC BLOOD PRESSURE: 64 MMHG | OXYGEN SATURATION: 98 % | RESPIRATION RATE: 16 BRPM | TEMPERATURE: 98.2 F

## 2023-10-08 DIAGNOSIS — R10.2 PELVIC PAIN: ICD-10-CM

## 2023-10-08 DIAGNOSIS — N39.0 UTI (URINARY TRACT INFECTION), UNCOMPLICATED: Primary | ICD-10-CM

## 2023-10-08 LAB
ALBUMIN SERPL-MCNC: 3.9 G/DL (ref 3.5–5)
ALBUMIN/GLOB SERPL: 1 (ref 1.1–2.2)
ALP SERPL-CCNC: 67 U/L (ref 45–117)
ALT SERPL-CCNC: 31 U/L (ref 12–78)
ANION GAP SERPL CALC-SCNC: 3 MMOL/L (ref 5–15)
APPEARANCE UR: CLEAR
AST SERPL-CCNC: 21 U/L (ref 15–37)
BACTERIA URNS QL MICRO: ABNORMAL /HPF
BASOPHILS # BLD: 0.1 K/UL (ref 0–0.1)
BASOPHILS NFR BLD: 1 % (ref 0–1)
BILIRUB SERPL-MCNC: 0.2 MG/DL (ref 0.2–1)
BILIRUB UR QL: NEGATIVE
BUN SERPL-MCNC: 14 MG/DL (ref 6–20)
BUN/CREAT SERPL: 19 (ref 12–20)
CALCIUM SERPL-MCNC: 9.1 MG/DL (ref 8.5–10.1)
CHLORIDE SERPL-SCNC: 106 MMOL/L (ref 97–108)
CO2 SERPL-SCNC: 28 MMOL/L (ref 21–32)
COLOR UR: ABNORMAL
CREAT SERPL-MCNC: 0.73 MG/DL (ref 0.55–1.02)
DIFFERENTIAL METHOD BLD: ABNORMAL
EOSINOPHIL # BLD: 0.2 K/UL (ref 0–0.4)
EOSINOPHIL NFR BLD: 2 % (ref 0–7)
EPITH CASTS URNS QL MICRO: ABNORMAL /LPF
ERYTHROCYTE [DISTWIDTH] IN BLOOD BY AUTOMATED COUNT: 12.9 % (ref 11.5–14.5)
GLOBULIN SER CALC-MCNC: 4.1 G/DL (ref 2–4)
GLUCOSE SERPL-MCNC: 152 MG/DL (ref 65–100)
GLUCOSE UR STRIP.AUTO-MCNC: NEGATIVE MG/DL
HCG SERPL-ACNC: <1 MIU/ML (ref 0–6)
HCG UR QL: NEGATIVE
HCT VFR BLD AUTO: 38.6 % (ref 35–47)
HGB BLD-MCNC: 12.3 G/DL (ref 11.5–16)
HGB UR QL STRIP: NEGATIVE
HYALINE CASTS URNS QL MICRO: ABNORMAL /LPF (ref 0–2)
IMM GRANULOCYTES # BLD AUTO: 0 K/UL (ref 0–0.04)
IMM GRANULOCYTES NFR BLD AUTO: 0 % (ref 0–0.5)
KETONES UR QL STRIP.AUTO: NEGATIVE MG/DL
LEUKOCYTE ESTERASE UR QL STRIP.AUTO: ABNORMAL
LIPASE SERPL-CCNC: 21 U/L (ref 13–75)
LYMPHOCYTES # BLD: 4.4 K/UL (ref 0.8–3.5)
LYMPHOCYTES NFR BLD: 55 % (ref 12–49)
MCH RBC QN AUTO: 27.3 PG (ref 26–34)
MCHC RBC AUTO-ENTMCNC: 31.9 G/DL (ref 30–36.5)
MCV RBC AUTO: 85.8 FL (ref 80–99)
MONOCYTES # BLD: 0.6 K/UL (ref 0–1)
MONOCYTES NFR BLD: 8 % (ref 5–13)
NEUTS SEG # BLD: 2.8 K/UL (ref 1.8–8)
NEUTS SEG NFR BLD: 34 % (ref 32–75)
NITRITE UR QL STRIP.AUTO: NEGATIVE
NRBC # BLD: 0 K/UL (ref 0–0.01)
NRBC BLD-RTO: 0 PER 100 WBC
PH UR STRIP: 7.5 (ref 5–8)
PLATELET # BLD AUTO: 407 K/UL (ref 150–400)
PMV BLD AUTO: 9.4 FL (ref 8.9–12.9)
POTASSIUM SERPL-SCNC: 4.2 MMOL/L (ref 3.5–5.1)
PROT SERPL-MCNC: 8 G/DL (ref 6.4–8.2)
PROT UR STRIP-MCNC: NEGATIVE MG/DL
RBC # BLD AUTO: 4.5 M/UL (ref 3.8–5.2)
RBC #/AREA URNS HPF: ABNORMAL /HPF (ref 0–5)
RBC MORPH BLD: ABNORMAL
SODIUM SERPL-SCNC: 137 MMOL/L (ref 136–145)
SP GR UR REFRACTOMETRY: 1.02 (ref 1–1.03)
SPECIMEN HOLD: NORMAL
UROBILINOGEN UR QL STRIP.AUTO: 1 EU/DL (ref 0.2–1)
WBC # BLD AUTO: 8.1 K/UL (ref 3.6–11)
WBC MORPH BLD: ABNORMAL
WBC URNS QL MICRO: ABNORMAL /HPF (ref 0–4)

## 2023-10-08 PROCEDURE — 2580000003 HC RX 258: Performed by: EMERGENCY MEDICINE

## 2023-10-08 PROCEDURE — 83690 ASSAY OF LIPASE: CPT

## 2023-10-08 PROCEDURE — 96374 THER/PROPH/DIAG INJ IV PUSH: CPT

## 2023-10-08 PROCEDURE — 6360000002 HC RX W HCPCS: Performed by: EMERGENCY MEDICINE

## 2023-10-08 PROCEDURE — 85025 COMPLETE CBC W/AUTO DIFF WBC: CPT

## 2023-10-08 PROCEDURE — 99284 EMERGENCY DEPT VISIT MOD MDM: CPT

## 2023-10-08 PROCEDURE — 81001 URINALYSIS AUTO W/SCOPE: CPT

## 2023-10-08 PROCEDURE — 87086 URINE CULTURE/COLONY COUNT: CPT

## 2023-10-08 PROCEDURE — 80053 COMPREHEN METABOLIC PANEL: CPT

## 2023-10-08 PROCEDURE — 81025 URINE PREGNANCY TEST: CPT

## 2023-10-08 PROCEDURE — 84702 CHORIONIC GONADOTROPIN TEST: CPT

## 2023-10-08 PROCEDURE — 76856 US EXAM PELVIC COMPLETE: CPT

## 2023-10-08 PROCEDURE — 36415 COLL VENOUS BLD VENIPUNCTURE: CPT

## 2023-10-08 RX ORDER — NITROFURANTOIN 25; 75 MG/1; MG/1
100 CAPSULE ORAL 2 TIMES DAILY
Qty: 6 CAPSULE | Refills: 0 | Status: SHIPPED | OUTPATIENT
Start: 2023-10-08 | End: 2023-10-11

## 2023-10-08 RX ADMIN — WATER 1000 MG: 1 INJECTION INTRAMUSCULAR; INTRAVENOUS; SUBCUTANEOUS at 18:00

## 2023-10-08 ASSESSMENT — ENCOUNTER SYMPTOMS
COUGH: 0
SHORTNESS OF BREATH: 0
BACK PAIN: 0
ABDOMINAL PAIN: 0
DIARRHEA: 0
TROUBLE SWALLOWING: 0
NAUSEA: 0

## 2023-10-08 ASSESSMENT — PAIN DESCRIPTION - LOCATION: LOCATION: ABDOMEN;BACK

## 2023-10-08 ASSESSMENT — PAIN DESCRIPTION - DESCRIPTORS: DESCRIPTORS: ACHING;CRAMPING

## 2023-10-08 ASSESSMENT — PAIN - FUNCTIONAL ASSESSMENT: PAIN_FUNCTIONAL_ASSESSMENT: 0-10

## 2023-10-08 ASSESSMENT — PAIN SCALES - GENERAL: PAINLEVEL_OUTOF10: 5

## 2023-10-08 NOTE — ED TRIAGE NOTES
Pt arrives co missed period for approx 40days. States neg preg test x3 days ago  States she wakes up every day with cramping like she is going to start and by nighttime it goes away.  States this has been going on for approx 1 week  Recently took shot to help with ovulation  Denies any vaginal bleeding or discharge

## 2023-10-09 LAB
BACTERIA SPEC CULT: NORMAL
SERVICE CMNT-IMP: NORMAL

## 2023-12-29 ENCOUNTER — HOSPITAL ENCOUNTER (EMERGENCY)
Facility: HOSPITAL | Age: 33
Discharge: HOME | End: 2023-12-29
Attending: EMERGENCY MEDICINE
Payer: MEDICAID

## 2023-12-29 ENCOUNTER — APPOINTMENT (OUTPATIENT)
Dept: VASCULAR SURGERY | Facility: HOSPITAL | Age: 33
End: 2023-12-29
Attending: EMERGENCY MEDICINE
Payer: MEDICAID

## 2023-12-29 VITALS
DIASTOLIC BLOOD PRESSURE: 72 MMHG | HEART RATE: 83 BPM | SYSTOLIC BLOOD PRESSURE: 114 MMHG | OXYGEN SATURATION: 99 % | BODY MASS INDEX: 28.52 KG/M2 | HEIGHT: 62 IN | TEMPERATURE: 98.1 F | RESPIRATION RATE: 18 BRPM | WEIGHT: 155 LBS

## 2023-12-29 DIAGNOSIS — M25.569 ACUTE KNEE PAIN, UNSPECIFIED LATERALITY: Primary | ICD-10-CM

## 2023-12-29 LAB
ALBUMIN SERPL-MCNC: 3.6 G/DL (ref 3.5–5)
ALBUMIN/GLOB SERPL: 1 (ref 1.1–2.2)
ALP SERPL-CCNC: 66 U/L (ref 45–117)
ALT SERPL-CCNC: 24 U/L (ref 12–78)
ANION GAP SERPL CALC-SCNC: 4 MMOL/L (ref 5–15)
APPEARANCE UR: CLEAR
AST SERPL-CCNC: 14 U/L (ref 15–37)
BACTERIA URNS QL MICRO: NEGATIVE /HPF
BASOPHILS # BLD: 0 K/UL (ref 0–0.1)
BASOPHILS NFR BLD: 1 % (ref 0–1)
BILIRUB SERPL-MCNC: 0.3 MG/DL (ref 0.2–1)
BILIRUB UR QL: NEGATIVE
BUN SERPL-MCNC: 13 MG/DL (ref 6–20)
BUN/CREAT SERPL: 20 (ref 12–20)
CALCIUM SERPL-MCNC: 8.9 MG/DL (ref 8.5–10.1)
CHLORIDE SERPL-SCNC: 107 MMOL/L (ref 97–108)
CO2 SERPL-SCNC: 27 MMOL/L (ref 21–32)
COLOR UR: ABNORMAL
CREAT SERPL-MCNC: 0.65 MG/DL (ref 0.55–1.02)
DIFFERENTIAL METHOD BLD: NORMAL
EOSINOPHIL # BLD: 0.1 K/UL (ref 0–0.4)
EOSINOPHIL NFR BLD: 1 % (ref 0–7)
EPITH CASTS URNS QL MICRO: ABNORMAL /LPF
ERYTHROCYTE [DISTWIDTH] IN BLOOD BY AUTOMATED COUNT: 12.3 % (ref 11.5–14.5)
GLOBULIN SER CALC-MCNC: 3.6 G/DL (ref 2–4)
GLUCOSE SERPL-MCNC: 278 MG/DL (ref 65–100)
GLUCOSE UR STRIP.AUTO-MCNC: >1000 MG/DL
HCG UR QL: NEGATIVE
HCT VFR BLD AUTO: 37.3 % (ref 35–47)
HGB BLD-MCNC: 12.4 G/DL (ref 11.5–16)
HGB UR QL STRIP: NEGATIVE
HYALINE CASTS URNS QL MICRO: ABNORMAL /LPF (ref 0–2)
IMM GRANULOCYTES # BLD AUTO: 0 K/UL (ref 0–0.04)
IMM GRANULOCYTES NFR BLD AUTO: 0 % (ref 0–0.5)
KETONES UR QL STRIP.AUTO: NEGATIVE MG/DL
LEUKOCYTE ESTERASE UR QL STRIP.AUTO: ABNORMAL
LYMPHOCYTES # BLD: 2.7 K/UL (ref 0.8–3.5)
LYMPHOCYTES NFR BLD: 46 % (ref 12–49)
MAGNESIUM SERPL-MCNC: 2 MG/DL (ref 1.6–2.4)
MCH RBC QN AUTO: 28.4 PG (ref 26–34)
MCHC RBC AUTO-ENTMCNC: 33.2 G/DL (ref 30–36.5)
MCV RBC AUTO: 85.4 FL (ref 80–99)
MONOCYTES # BLD: 0.4 K/UL (ref 0–1)
MONOCYTES NFR BLD: 6 % (ref 5–13)
NEUTS SEG # BLD: 2.7 K/UL (ref 1.8–8)
NEUTS SEG NFR BLD: 46 % (ref 32–75)
NITRITE UR QL STRIP.AUTO: NEGATIVE
NRBC # BLD: 0 K/UL (ref 0–0.01)
NRBC BLD-RTO: 0 PER 100 WBC
PH UR STRIP: 7 (ref 5–8)
PLATELET # BLD AUTO: 343 K/UL (ref 150–400)
PMV BLD AUTO: 9.3 FL (ref 8.9–12.9)
POTASSIUM SERPL-SCNC: 3.6 MMOL/L (ref 3.5–5.1)
PROT SERPL-MCNC: 7.2 G/DL (ref 6.4–8.2)
PROT UR STRIP-MCNC: NEGATIVE MG/DL
RBC # BLD AUTO: 4.37 M/UL (ref 3.8–5.2)
RBC #/AREA URNS HPF: ABNORMAL /HPF (ref 0–5)
SODIUM SERPL-SCNC: 138 MMOL/L (ref 136–145)
SP GR UR REFRACTOMETRY: >1.03 (ref 1–1.03)
SPECIMEN HOLD: NORMAL
UROBILINOGEN UR QL STRIP.AUTO: 1 EU/DL (ref 0.2–1)
WBC # BLD AUTO: 5.9 K/UL (ref 3.6–11)
WBC URNS QL MICRO: ABNORMAL /HPF (ref 0–4)

## 2023-12-29 PROCEDURE — 93971 EXTREMITY STUDY: CPT

## 2023-12-29 PROCEDURE — 99284 EMERGENCY DEPT VISIT MOD MDM: CPT

## 2023-12-29 PROCEDURE — 80053 COMPREHEN METABOLIC PANEL: CPT

## 2023-12-29 PROCEDURE — 81025 URINE PREGNANCY TEST: CPT

## 2023-12-29 PROCEDURE — 85025 COMPLETE CBC W/AUTO DIFF WBC: CPT

## 2023-12-29 PROCEDURE — 83735 ASSAY OF MAGNESIUM: CPT

## 2023-12-29 PROCEDURE — 87086 URINE CULTURE/COLONY COUNT: CPT

## 2023-12-29 PROCEDURE — 36415 COLL VENOUS BLD VENIPUNCTURE: CPT

## 2023-12-29 PROCEDURE — 81001 URINALYSIS AUTO W/SCOPE: CPT

## 2023-12-29 ASSESSMENT — PAIN DESCRIPTION - DESCRIPTORS: DESCRIPTORS: ACHING

## 2023-12-29 ASSESSMENT — PAIN DESCRIPTION - ORIENTATION: ORIENTATION: RIGHT

## 2023-12-29 ASSESSMENT — PAIN - FUNCTIONAL ASSESSMENT: PAIN_FUNCTIONAL_ASSESSMENT: 0-10

## 2023-12-29 ASSESSMENT — PAIN SCALES - GENERAL: PAINLEVEL_OUTOF10: 8

## 2023-12-29 ASSESSMENT — PAIN DESCRIPTION - LOCATION: LOCATION: KNEE

## 2023-12-29 NOTE — ED TRIAGE NOTES
Patient arrives with c/o posterior right knee pain since waking up. Denies any recent injury. States that she noticed pain when trying to walk after waking up. Reports taking ibuprofen around 1300 today. Patient ambulatory to triage.

## 2023-12-29 NOTE — ED PROVIDER NOTES
[facility] EMERGENCY DEPT  EMERGENCY DEPARTMENT ENCOUNTER      Patient Name: Jacqueline Killian  MRN: [id number]  [address] [facility] 1990  Date of Evaluation: 2023  Physician: Sebastien Kelley MD    CHIEF COMPLAINT       Chief Complaint   Patient presents with    Knee Pain       HISTORY OF PRESENT ILLNESS   (Location/Symptom, Timing/Onset, Context/Setting, Quality, Duration, Modifying Factors, Severity)   Jacqueline Killian, 35 y.o., female     31-year-old female presents with atraumatic right knee pain starting this morning when she woke up around 12:00. She denies history of injury to the knee. She denies shortness of breath or chest pain. Nursing Notes were reviewed. REVIEW OF SYSTEMS    (Not required)   Review of Systems   Musculoskeletal:  Positive for arthralgias. Except as noted above the remainder of the review of systems was reviewed and negative.      PAST MEDICAL HISTORY     Past Medical History:   Diagnosis Date    Chlamydia 2019    per pt    COVID-19 2021    recovered at home    Diabetes mellitus ([address]) 2012    Type 2 diabetes mellitus without complication, unspecified whether long term insulin use ([address])    Hypercholesterolemia     Migraine     Mild depression 2012    Pap smear abnormality of cervix/human papillomavirus (HPV) positive 2020, 2011    Postpartum depression 2012    Preeclampsia     Thrombocytosis 3/1/2019    Trichomonas infection 2020    Vitamin D deficiency        SURGICAL HISTORY       Past Surgical History:   Procedure Laterality Date     DELIVERY ONLY      c/s for LGA      SECTION  ;2016    x2       CURRENT MEDICATIONS       Previous Medications    ATORVASTATIN (LIPITOR) 40 MG TABLET    Take 1 tablet by mouth daily    BD INSULIN SYRINGE U/F 31G X \" 0.3 ML MISC    TEST 3 TIMES DAILY, AS DIRECTED    FERROUS SULFATE (IRON 325) 325 (65 FE) MG TABLET    Take 1 tablet on Monday, Wednesday, and Friday    INSULIN ASPART (Vernia Dolin) MD  12/29/23 2133       Jesse Castro MD  12/29/23 2133

## 2023-12-29 NOTE — DISCHARGE INSTRUCTIONS
Thank you for allowing us to provide you with medical care today. We realize that you have many choices for your emergency care needs. We thank you for choosing [facility]. Please choose us in the future for any continued health care needs. The exam and treatment you received in the Emergency Department were for an emergent problem and are not intended as complete care. It is important that you follow up with a doctor, nurse practitioner, or physician's assistant for ongoing care. If your symptoms worsen or you do not improve as expected and you are unable to reach your usual health care provider, you should return to the Emergency Department. We are available 24 hours a day. Please make an appointment with your health care provider(s) for follow up of your Emergency Department visit. Take this sheet with you when you go to your follow-up visit.

## 2023-12-30 ENCOUNTER — APPOINTMENT (OUTPATIENT)
Facility: HOSPITAL | Age: 33
End: 2023-12-30
Payer: MEDICAID

## 2023-12-30 ENCOUNTER — HOSPITAL ENCOUNTER (EMERGENCY)
Facility: HOSPITAL | Age: 33
Discharge: HOME | End: 2023-12-30
Attending: STUDENT IN AN ORGANIZED HEALTH CARE EDUCATION/TRAINING PROGRAM
Payer: MEDICAID

## 2023-12-30 VITALS
HEIGHT: 62 IN | DIASTOLIC BLOOD PRESSURE: 66 MMHG | HEART RATE: 72 BPM | TEMPERATURE: 98.8 F | OXYGEN SATURATION: 96 % | RESPIRATION RATE: 14 BRPM | SYSTOLIC BLOOD PRESSURE: 108 MMHG | BODY MASS INDEX: 28.52 KG/M2 | WEIGHT: 155 LBS

## 2023-12-30 DIAGNOSIS — M25.561 ACUTE PAIN OF RIGHT KNEE: Primary | ICD-10-CM

## 2023-12-30 LAB — ECHO BSA: 1.75 M2

## 2023-12-30 PROCEDURE — 96372 THER/PROPH/DIAG INJ SC/IM: CPT

## 2023-12-30 PROCEDURE — 93971 EXTREMITY STUDY: CPT

## 2023-12-30 PROCEDURE — 6360000002 HC RX W HCPCS: Performed by: STUDENT IN AN ORGANIZED HEALTH CARE EDUCATION/TRAINING PROGRAM

## 2023-12-30 PROCEDURE — 73562 X-RAY EXAM OF KNEE 3: CPT

## 2023-12-30 PROCEDURE — 6370000000 HC RX 637 (ALT 250 FOR IP): Performed by: STUDENT IN AN ORGANIZED HEALTH CARE EDUCATION/TRAINING PROGRAM

## 2023-12-30 PROCEDURE — 99284 EMERGENCY DEPT VISIT MOD MDM: CPT

## 2023-12-30 RX ORDER — KETOROLAC TROMETHAMINE 30 MG/ML
30 INJECTION, SOLUTION INTRAMUSCULAR; INTRAVENOUS
Status: COMPLETED | OUTPATIENT
Start: 2023-12-30 | End: 2023-12-30

## 2023-12-30 RX ORDER — IBUPROFEN 600 MG/1
600 TABLET ORAL 4 TIMES DAILY PRN
Qty: 360 TABLET | Refills: 1 | Status: SHIPPED | OUTPATIENT
Start: 2023-12-30

## 2023-12-30 RX ORDER — ACETAMINOPHEN 325 MG/1
650 TABLET ORAL
Status: COMPLETED | OUTPATIENT
Start: 2023-12-30 | End: 2023-12-30

## 2023-12-30 RX ORDER — ACETAMINOPHEN 500 MG
500 TABLET ORAL 4 TIMES DAILY PRN
Qty: 360 TABLET | Refills: 1 | Status: SHIPPED | OUTPATIENT
Start: 2023-12-30

## 2023-12-30 RX ADMIN — ACETAMINOPHEN 650 MG: 325 TABLET ORAL at 07:19

## 2023-12-30 RX ADMIN — KETOROLAC TROMETHAMINE 30 MG: 30 INJECTION, SOLUTION INTRAMUSCULAR; INTRAVENOUS at 07:19

## 2023-12-30 ASSESSMENT — PAIN SCALES - GENERAL: PAINLEVEL_OUTOF10: 2

## 2023-12-30 ASSESSMENT — PAIN DESCRIPTION - ORIENTATION: ORIENTATION: RIGHT

## 2023-12-30 ASSESSMENT — PAIN DESCRIPTION - LOCATION: LOCATION: LEG

## 2023-12-30 ASSESSMENT — PAIN DESCRIPTION - DESCRIPTORS: DESCRIPTORS: SHARP;SHOOTING;PRESSURE

## 2023-12-30 ASSESSMENT — PAIN - FUNCTIONAL ASSESSMENT: PAIN_FUNCTIONAL_ASSESSMENT: 0-10

## 2023-12-30 ASSESSMENT — PAIN DESCRIPTION - DIRECTION: RADIATING_TOWARDS: UP AND DOWN RIGHT LEG

## 2023-12-30 NOTE — ED TRIAGE NOTES
Patient arrives via private vehicle with complaints of right leg pain, pain is 2/10 when sitting and when attempting to walk is a 9/10    States she was seen in ED yesterday and eval for possible blood clot     Denies taking any medications for attempted pain relief

## 2023-12-31 LAB
BACTERIA SPEC CULT: NORMAL
SERVICE CMNT-IMP: NORMAL

## 2024-01-25 ENCOUNTER — HOSPITAL ENCOUNTER (EMERGENCY)
Facility: HOSPITAL | Age: 34
Discharge: HOME | End: 2024-01-26
Attending: EMERGENCY MEDICINE
Payer: MEDICAID

## 2024-01-25 VITALS
RESPIRATION RATE: 17 BRPM | BODY MASS INDEX: 28.36 KG/M2 | TEMPERATURE: 98.3 F | SYSTOLIC BLOOD PRESSURE: 102 MMHG | DIASTOLIC BLOOD PRESSURE: 68 MMHG | HEART RATE: 87 BPM | OXYGEN SATURATION: 98 % | HEIGHT: 62 IN | WEIGHT: 154.1 LBS

## 2024-01-25 DIAGNOSIS — R11.2 NAUSEA VOMITING AND DIARRHEA: Primary | ICD-10-CM

## 2024-01-25 DIAGNOSIS — N39.0 URINARY TRACT INFECTION IN FEMALE: ICD-10-CM

## 2024-01-25 DIAGNOSIS — R19.7 NAUSEA VOMITING AND DIARRHEA: Primary | ICD-10-CM

## 2024-01-25 LAB
BASOPHILS # BLD: 0 K/UL (ref 0–0.1)
BASOPHILS NFR BLD: 0 % (ref 0–1)
DIFFERENTIAL METHOD BLD: ABNORMAL
EOSINOPHIL # BLD: 0 K/UL (ref 0–0.4)
EOSINOPHIL NFR BLD: 0 % (ref 0–7)
ERYTHROCYTE [DISTWIDTH] IN BLOOD BY AUTOMATED COUNT: 12.2 % (ref 11.5–14.5)
HCG UR QL: NEGATIVE
HCT VFR BLD AUTO: 39.8 % (ref 35–47)
HGB BLD-MCNC: 13.2 G/DL (ref 11.5–16)
IMM GRANULOCYTES # BLD AUTO: 0.1 K/UL (ref 0–0.04)
IMM GRANULOCYTES NFR BLD AUTO: 1 % (ref 0–0.5)
LYMPHOCYTES # BLD: 2.5 K/UL (ref 0.8–3.5)
LYMPHOCYTES NFR BLD: 30 % (ref 12–49)
MCH RBC QN AUTO: 28 PG (ref 26–34)
MCHC RBC AUTO-ENTMCNC: 33.2 G/DL (ref 30–36.5)
MCV RBC AUTO: 84.3 FL (ref 80–99)
MONOCYTES # BLD: 0.2 K/UL (ref 0–1)
MONOCYTES NFR BLD: 3 % (ref 5–13)
NEUTS SEG # BLD: 5.5 K/UL (ref 1.8–8)
NEUTS SEG NFR BLD: 66 % (ref 32–75)
NRBC # BLD: 0 K/UL (ref 0–0.01)
NRBC BLD-RTO: 0 PER 100 WBC
PLATELET # BLD AUTO: 376 K/UL (ref 150–400)
PMV BLD AUTO: 9.5 FL (ref 8.9–12.9)
RBC # BLD AUTO: 4.72 M/UL (ref 3.8–5.2)
WBC # BLD AUTO: 8.3 K/UL (ref 3.6–11)

## 2024-01-25 PROCEDURE — 2580000003 HC RX 258: Performed by: EMERGENCY MEDICINE

## 2024-01-25 PROCEDURE — 83690 ASSAY OF LIPASE: CPT

## 2024-01-25 PROCEDURE — 96374 THER/PROPH/DIAG INJ IV PUSH: CPT

## 2024-01-25 PROCEDURE — 36415 COLL VENOUS BLD VENIPUNCTURE: CPT

## 2024-01-25 PROCEDURE — 81001 URINALYSIS AUTO W/SCOPE: CPT

## 2024-01-25 PROCEDURE — 83735 ASSAY OF MAGNESIUM: CPT

## 2024-01-25 PROCEDURE — 6360000002 HC RX W HCPCS: Performed by: EMERGENCY MEDICINE

## 2024-01-25 PROCEDURE — 80053 COMPREHEN METABOLIC PANEL: CPT

## 2024-01-25 PROCEDURE — 81002 URINALYSIS NONAUTO W/O SCOPE: CPT

## 2024-01-25 PROCEDURE — 96375 TX/PRO/DX INJ NEW DRUG ADDON: CPT

## 2024-01-25 PROCEDURE — 85025 COMPLETE CBC W/AUTO DIFF WBC: CPT

## 2024-01-25 PROCEDURE — 81025 URINE PREGNANCY TEST: CPT

## 2024-01-25 PROCEDURE — 99284 EMERGENCY DEPT VISIT MOD MDM: CPT

## 2024-01-25 RX ORDER — ONDANSETRON 2 MG/ML
4 INJECTION INTRAMUSCULAR; INTRAVENOUS
Status: COMPLETED | OUTPATIENT
Start: 2024-01-25 | End: 2024-01-25

## 2024-01-25 RX ORDER — 0.9 % SODIUM CHLORIDE 0.9 %
1000 INTRAVENOUS SOLUTION INTRAVENOUS ONCE
Status: COMPLETED | OUTPATIENT
Start: 2024-01-25 | End: 2024-01-26

## 2024-01-25 RX ADMIN — ONDANSETRON 4 MG: 2 INJECTION INTRAMUSCULAR; INTRAVENOUS at 23:47

## 2024-01-25 RX ADMIN — SODIUM CHLORIDE 1000 ML: 9 INJECTION, SOLUTION INTRAVENOUS at 23:45

## 2024-01-25 ASSESSMENT — PAIN DESCRIPTION - LOCATION: LOCATION: ABDOMEN

## 2024-01-25 ASSESSMENT — PAIN - FUNCTIONAL ASSESSMENT: PAIN_FUNCTIONAL_ASSESSMENT: 0-10

## 2024-01-25 ASSESSMENT — PAIN SCALES - GENERAL: PAINLEVEL_OUTOF10: 10

## 2024-01-26 ENCOUNTER — APPOINTMENT (OUTPATIENT)
Facility: HOSPITAL | Age: 34
End: 2024-01-26
Payer: MEDICAID

## 2024-01-26 LAB
ALBUMIN SERPL-MCNC: 3.8 G/DL (ref 3.5–5)
ALBUMIN/GLOB SERPL: 0.9 (ref 1.1–2.2)
ALP SERPL-CCNC: 71 U/L (ref 45–117)
ALT SERPL-CCNC: 23 U/L (ref 12–78)
ANION GAP SERPL CALC-SCNC: 8 MMOL/L (ref 5–15)
APPEARANCE UR: CLEAR
AST SERPL-CCNC: 11 U/L (ref 15–37)
BACTERIA URNS QL MICRO: ABNORMAL /HPF
BILIRUB SERPL-MCNC: 0.6 MG/DL (ref 0.2–1)
BILIRUB UR QL: NEGATIVE
BUN SERPL-MCNC: 13 MG/DL (ref 6–20)
BUN/CREAT SERPL: 21 (ref 12–20)
CALCIUM SERPL-MCNC: 9.5 MG/DL (ref 8.5–10.1)
CHLORIDE SERPL-SCNC: 104 MMOL/L (ref 97–108)
CO2 SERPL-SCNC: 24 MMOL/L (ref 21–32)
COLOR UR: ABNORMAL
CREAT SERPL-MCNC: 0.62 MG/DL (ref 0.55–1.02)
EPITH CASTS URNS QL MICRO: ABNORMAL /LPF
GLOBULIN SER CALC-MCNC: 4.2 G/DL (ref 2–4)
GLUCOSE SERPL-MCNC: 237 MG/DL (ref 65–100)
GLUCOSE UR STRIP.AUTO-MCNC: 250 MG/DL
HGB UR QL STRIP: ABNORMAL
HYALINE CASTS URNS QL MICRO: ABNORMAL /LPF (ref 0–5)
KETONES UR QL STRIP.AUTO: ABNORMAL MG/DL
LEUKOCYTE ESTERASE UR QL STRIP.AUTO: ABNORMAL
LIPASE SERPL-CCNC: 18 U/L (ref 13–75)
MAGNESIUM SERPL-MCNC: 1.6 MG/DL (ref 1.6–2.4)
NITRITE UR QL STRIP.AUTO: NEGATIVE
PH UR STRIP: 5.5 (ref 5–8)
POTASSIUM SERPL-SCNC: 3.9 MMOL/L (ref 3.5–5.1)
PROT SERPL-MCNC: 8 G/DL (ref 6.4–8.2)
PROT UR STRIP-MCNC: 100 MG/DL
RBC #/AREA URNS HPF: ABNORMAL /HPF (ref 0–5)
SODIUM SERPL-SCNC: 136 MMOL/L (ref 136–145)
SP GR UR REFRACTOMETRY: 1.03 (ref 1–1.03)
UROBILINOGEN UR QL STRIP.AUTO: 1 EU/DL (ref 0.2–1)
WBC URNS QL MICRO: ABNORMAL /HPF (ref 0–4)

## 2024-01-26 PROCEDURE — 87086 URINE CULTURE/COLONY COUNT: CPT

## 2024-01-26 PROCEDURE — 6360000002 HC RX W HCPCS: Performed by: EMERGENCY MEDICINE

## 2024-01-26 PROCEDURE — 74176 CT ABD & PELVIS W/O CONTRAST: CPT

## 2024-01-26 PROCEDURE — 2580000003 HC RX 258: Performed by: EMERGENCY MEDICINE

## 2024-01-26 RX ORDER — 0.9 % SODIUM CHLORIDE 0.9 %
1000 INTRAVENOUS SOLUTION INTRAVENOUS ONCE
Status: DISCONTINUED | OUTPATIENT
Start: 2024-01-26 | End: 2024-01-26

## 2024-01-26 RX ORDER — ONDANSETRON 4 MG/1
4 TABLET, ORALLY DISINTEGRATING ORAL 3 TIMES DAILY PRN
Qty: 15 TABLET | Refills: 0 | Status: SHIPPED | OUTPATIENT
Start: 2024-01-26

## 2024-01-26 RX ORDER — LEVOFLOXACIN 750 MG/1
750 TABLET, FILM COATED ORAL DAILY
Qty: 5 TABLET | Refills: 0 | Status: SHIPPED | OUTPATIENT
Start: 2024-01-26 | End: 2024-01-31

## 2024-01-26 RX ORDER — KETOROLAC TROMETHAMINE 15 MG/ML
15 INJECTION, SOLUTION INTRAMUSCULAR; INTRAVENOUS ONCE
Status: COMPLETED | OUTPATIENT
Start: 2024-01-26 | End: 2024-01-26

## 2024-01-26 RX ADMIN — KETOROLAC TROMETHAMINE 15 MG: 15 INJECTION, SOLUTION INTRAMUSCULAR; INTRAVENOUS at 01:50

## 2024-01-26 RX ADMIN — WATER 1000 MG: 1 INJECTION INTRAMUSCULAR; INTRAVENOUS; SUBCUTANEOUS at 00:51

## 2024-01-26 NOTE — ED TRIAGE NOTES
Pt amb to triage with c/o n/v/d starting today. States she started abx for UTI last week. Woke up from nap today with vomiting and diarrhea. Reports 2 episodes emesis and 3 episodes diarrhea.   Pt is aaox4, gcs 15, rr even and unlabored, speaking in full clear sentences.

## 2024-01-26 NOTE — DISCHARGE INSTRUCTIONS
Please use Zofran as needed for nausea and stay hydrated.  Take the antibiotics prescribed as it is a different family than cephalexin that you are currently taking.  Stop taking cephalexin.  We have sent a urine culture, and if your antibiotics need changing we will give you a call.  Thank you.

## 2024-01-26 NOTE — ED PROVIDER NOTES
[facility] EMERGENCY DEPT  EMERGENCY DEPARTMENT ENCOUNTER      Pt Name: Jennifer Baltazar  MRN: [id number]  Birthdate 1990  Date of evaluation: 2024  Provider: Radha Greer MD    CHIEF COMPLAINT       Chief Complaint   Patient presents with    Emesis    Diarrhea         HISTORY OF PRESENT ILLNESS   (Location/Symptom, Timing/Onset, Context/Setting, Quality, Duration, Modifying Factors, Severity)  Note limiting factors.   Patient is a 33-year-old female with history of diabetes, hyperlipidemia, migraine, depression, postpartum depression, preeclampsia, thrombocytosis who presents with nausea, vomiting, diarrhea that started this evening.  Patient reports that she is on Keflex for UTI that was started at outside hospital last week.  Patient denies fevers, chills.  Denies any bad food exposure, recent travel or sick contacts.  Complains of crampy abdominal pain.    The history is provided by the patient.       Nursing Notes were reviewed.    REVIEW OF SYSTEMS    Not Required     Review of Systems    PAST MEDICAL HISTORY     Past Medical History:   Diagnosis Date    Chlamydia 2019    per pt    COVID-19 2021    recovered at home    Diabetes mellitus (HCC) 2012    Type 2 diabetes mellitus without complication, unspecified whether long term insulin use (HCC)    Hypercholesterolemia     Migraine     Mild depression 2012    Pap smear abnormality of cervix/human papillomavirus (HPV) positive 2020, 2011    Postpartum depression 2012    Preeclampsia     Thrombocytosis 3/1/2019    Trichomonas infection 2020    Vitamin D deficiency        SURGICAL HISTORY       Past Surgical History:   Procedure Laterality Date     DELIVERY ONLY      c/s for LGA      SECTION  ;2016    x2       CURRENT MEDICATIONS       Previous Medications    ACETAMINOPHEN (TYLENOL) 500 MG TABLET    Take 1 tablet by mouth 4 times daily as needed for Pain    ATORVASTATIN (LIPITOR) 40 MG TABLET

## 2024-01-27 LAB
BACTERIA SPEC CULT: NORMAL
SERVICE CMNT-IMP: NORMAL

## 2024-04-09 ENCOUNTER — OFFICE VISIT (OUTPATIENT)
Age: 34
End: 2024-04-09
Payer: MEDICAID

## 2024-04-09 VITALS
BODY MASS INDEX: 27.8 KG/M2 | WEIGHT: 152 LBS | HEART RATE: 74 BPM | SYSTOLIC BLOOD PRESSURE: 110 MMHG | DIASTOLIC BLOOD PRESSURE: 68 MMHG

## 2024-04-09 DIAGNOSIS — A64 STD (FEMALE): ICD-10-CM

## 2024-04-09 DIAGNOSIS — Z12.4 ENCOUNTER FOR PAPANICOLAOU SMEAR FOR CERVICAL CANCER SCREENING: ICD-10-CM

## 2024-04-09 DIAGNOSIS — Z01.419 ENCOUNTER FOR GYNECOLOGICAL EXAMINATION: Primary | ICD-10-CM

## 2024-04-09 PROCEDURE — 99395 PREV VISIT EST AGE 18-39: CPT | Performed by: OBSTETRICS & GYNECOLOGY

## 2024-04-09 RX ORDER — TAMSULOSIN HYDROCHLORIDE 0.4 MG/1
CAPSULE ORAL
COMMUNITY
Start: 2024-04-02

## 2024-04-09 RX ORDER — NITROFURANTOIN 25; 75 MG/1; MG/1
CAPSULE ORAL
COMMUNITY
Start: 2024-04-02

## 2024-04-09 NOTE — PROGRESS NOTES
Jennifer Baltazar is a 33 y.o. female returns for an annual exam     Chief Complaint   Patient presents with    Annual Exam         Patient's last menstrual period was 03/27/2024 (approximate).  Her periods are heavy in flow and often irregular with no apparent pattern.  She has dysmenorrhea.  Problems: she was advised she may have underlined fibroids in her last CT scan in Jan. 2024. See report. Discuss PCOS management.   Birth Control: none  Last Pap: normal obtained few  year(s) ago.  She does not have a history of EZE 2, 3 or cervical cancer.   With regard to the Gardisil vaccine, she has not received it yet      1. Have you been to the ER, urgent care clinic, or hospitalized since your last visit? No    2. Have you seen or consulted any other health care providers outside of the [facility] since your last visit? No    Examination chaperoned by Charlene Colorado MA.  
without central or paravaginal defects, no discharge present, no inflammatory lesions present, no masses present  Bladder: non-tender to palpation  Urethra: appears normal  Cervix: normal   Uterus: normal size, shape and consistency  Adnexa: no adnexal tenderness present, no adnexal masses present  Perineum: perineum within normal limits, no evidence of trauma, no rashes or skin lesions present  Anus: anus within normal limits, no hemorrhoids present  Inguinal Lymph Nodes: no lymphadenopathy present    Skin  General Inspection: no rash, no lesions identified    Neurologic/Psychiatric  Mental Status:  Orientation: grossly oriented to person, place and time  Mood and Affect: mood normal, affect appropriate    Assessment:  Routine gynecologic examination  Her current medical status is satisfactory with no evidence of significant gynecologic issues except DUB.  Also c/o discharge and wants STD screen.  Pt could start OCP prn.    Plan:  Counseled re: diet, exercise, healthy lifestyle  Return for yearly wellness visits  Gardisil counseling provided  Pt counseled regarding co-testing for high risk HPV with pap  Rec screening mammo at either 35 or 40

## 2024-04-10 LAB
HBV SURFACE AG SERPL QL IA: NEGATIVE
HCV IGG SERPL QL IA: NON REACTIVE
HIV 1+2 AB+HIV1 P24 AG SERPL QL IA: NON REACTIVE
RPR SER QL: NON REACTIVE

## 2024-04-17 ENCOUNTER — TELEPHONE (OUTPATIENT)
Age: 34
End: 2024-04-17

## 2024-04-17 LAB
C TRACH RRNA CVX QL NAA+PROBE: NEGATIVE
CYTOLOGIST CVX/VAG CYTO: ABNORMAL
CYTOLOGY CVX/VAG DOC CYTO: ABNORMAL
CYTOLOGY CVX/VAG DOC THIN PREP: ABNORMAL
DX ICD CODE: ABNORMAL
HPV GENOTYPE REFLEX: ABNORMAL
HPV I/H RISK 4 DNA CVX QL PROBE+SIG AMP: POSITIVE
HPV16 DNA CVX QL PROBE+SIG AMP: NEGATIVE
HPV18+45 E6+E7 MRNA CVX QL NAA+PROBE: NEGATIVE
Lab: ABNORMAL
N GONORRHOEA RRNA CVX QL NAA+PROBE: NEGATIVE
OTHER STN SPEC: ABNORMAL
STAT OF ADQ CVX/VAG CYTO-IMP: ABNORMAL
T VAGINALIS RRNA SPEC QL NAA+PROBE: NEGATIVE

## 2024-04-17 NOTE — TELEPHONE ENCOUNTER
PT name and  verified    32 yo last ov 24    PT calling stating she saw her pap results come back from 24, and wanted to see for when she is contacted about them, if a [facility] message or vm could be left if it is after 2 pm. Relayed results were just resulted at 0535 this morning and MD would need to review and advise based on results.  PT verbalizes understanding.    Please review and advise pap    Thank you

## 2024-07-15 ENCOUNTER — TRANSCRIBE ORDERS (OUTPATIENT)
Facility: HOSPITAL | Age: 34
End: 2024-07-15

## 2024-07-15 DIAGNOSIS — M54.50 LUMBAR PAIN: Primary | ICD-10-CM

## 2024-07-15 DIAGNOSIS — M54.2 CERVICAL PAIN: ICD-10-CM

## 2024-07-29 ENCOUNTER — APPOINTMENT (OUTPATIENT)
Facility: HOSPITAL | Age: 34
End: 2024-07-29
Attending: ORTHOPAEDIC SURGERY
Payer: MEDICAID

## 2024-07-29 ENCOUNTER — HOSPITAL ENCOUNTER (OUTPATIENT)
Facility: HOSPITAL | Age: 34
Discharge: HOME | End: 2024-08-01
Attending: ORTHOPAEDIC SURGERY
Payer: MEDICAID

## 2024-07-29 DIAGNOSIS — M54.2 CERVICAL PAIN: ICD-10-CM

## 2024-07-29 PROCEDURE — 72141 MRI NECK SPINE W/O DYE: CPT

## 2024-09-20 RX ORDER — LETROZOLE 2.5 MG/1
TABLET, FILM COATED ORAL
Qty: 30 TABLET | Refills: 3 | Status: SHIPPED | OUTPATIENT
Start: 2024-09-20

## 2024-11-07 ENCOUNTER — HOSPITAL ENCOUNTER (EMERGENCY)
Facility: HOSPITAL | Age: 34
Discharge: HOME | End: 2024-11-07
Attending: STUDENT IN AN ORGANIZED HEALTH CARE EDUCATION/TRAINING PROGRAM
Payer: MEDICAID

## 2024-11-07 VITALS
OXYGEN SATURATION: 100 % | SYSTOLIC BLOOD PRESSURE: 122 MMHG | RESPIRATION RATE: 15 BRPM | HEART RATE: 115 BPM | BODY MASS INDEX: 26.68 KG/M2 | DIASTOLIC BLOOD PRESSURE: 71 MMHG | WEIGHT: 150.57 LBS | HEIGHT: 63 IN | TEMPERATURE: 97.9 F

## 2024-11-07 DIAGNOSIS — S39.012A STRAIN OF LUMBAR REGION, INITIAL ENCOUNTER: ICD-10-CM
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
## 2024-11-07 PROCEDURE — 99283 EMERGENCY DEPT VISIT LOW MDM: CPT

## 2024-11-07 RX ORDER — CYCLOBENZAPRINE HCL 10 MG
10 TABLET ORAL 3 TIMES DAILY PRN
Qty: 21 TABLET | Refills: 0 | Status: SHIPPED | OUTPATIENT
Start: 2024-11-07 | End: 2024-11-17

## 2024-11-07 ASSESSMENT — ENCOUNTER SYMPTOMS
SORE THROAT: 0
ABDOMINAL PAIN: 0
DIARRHEA: 0
SHORTNESS OF BREATH: 0
NAUSEA: 0
VOMITING: 0
COUGH: 0
EYE PAIN: 0
BACK PAIN: 1

## 2024-11-07 ASSESSMENT — PAIN - FUNCTIONAL ASSESSMENT: PAIN_FUNCTIONAL_ASSESSMENT: 0-10

## 2024-11-07 ASSESSMENT — PAIN DESCRIPTION - LOCATION: LOCATION: BACK

## 2024-11-07 ASSESSMENT — PAIN SCALES - GENERAL: PAINLEVEL_OUTOF10: 5

## 2024-11-07 ASSESSMENT — PAIN DESCRIPTION - DESCRIPTORS: DESCRIPTORS: ACHING

## 2024-11-07 ASSESSMENT — PAIN DESCRIPTION - ORIENTATION: ORIENTATION: RIGHT;LOWER

## 2024-11-07 NOTE — DISCHARGE INSTRUCTIONS
Take new medications as prescribed.  You can take Advil or Tylenol as needed for pain.  Alternating ice and heat on low back can help as well.  Please avoid lifting anything heavier than 10 pounds and stretch as you are able.  Please follow-up with your PCP as soon as possible.
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
## 2024-12-11 ENCOUNTER — HOSPITAL ENCOUNTER (OUTPATIENT)
Facility: HOSPITAL | Age: 34
Discharge: HOME | End: 2024-12-14
Attending: ORTHOPAEDIC SURGERY
Payer: MEDICAID

## 2024-12-11 DIAGNOSIS — M50.30 DDD (DEGENERATIVE DISC DISEASE), CERVICAL: ICD-10-CM

## 2024-12-11 PROCEDURE — 72148 MRI LUMBAR SPINE W/O DYE: CPT

## 2025-03-08 ENCOUNTER — HOSPITAL ENCOUNTER (EMERGENCY)
Facility: HOSPITAL | Age: 35
Discharge: HOME | End: 2025-03-08
Attending: STUDENT IN AN ORGANIZED HEALTH CARE EDUCATION/TRAINING PROGRAM
Payer: MEDICAID

## 2025-03-08 ENCOUNTER — APPOINTMENT (OUTPATIENT)
Facility: HOSPITAL | Age: 35
End: 2025-03-08
Payer: MEDICAID

## 2025-03-08 VITALS
DIASTOLIC BLOOD PRESSURE: 73 MMHG | RESPIRATION RATE: 16 BRPM | WEIGHT: 156.53 LBS | TEMPERATURE: 97.9 F | OXYGEN SATURATION: 99 % | HEIGHT: 63 IN | BODY MASS INDEX: 27.73 KG/M2 | SYSTOLIC BLOOD PRESSURE: 131 MMHG | HEART RATE: 85 BPM

## 2025-03-08 DIAGNOSIS — M79.672 LEFT FOOT PAIN: Primary | ICD-10-CM

## 2025-03-08 PROCEDURE — 99283 EMERGENCY DEPT VISIT LOW MDM: CPT

## 2025-03-08 PROCEDURE — 73630 X-RAY EXAM OF FOOT: CPT

## 2025-03-08 RX ORDER — OXYCODONE AND ACETAMINOPHEN 5; 325 MG/1; MG/1
1 TABLET ORAL EVERY 4 HOURS PRN
COMMUNITY

## 2025-03-08 ASSESSMENT — PAIN DESCRIPTION - LOCATION: LOCATION: FOOT

## 2025-03-08 ASSESSMENT — PAIN DESCRIPTION - DESCRIPTORS: DESCRIPTORS: SHARP

## 2025-03-08 ASSESSMENT — PAIN SCALES - GENERAL: PAINLEVEL_OUTOF10: 7

## 2025-03-08 ASSESSMENT — PAIN - FUNCTIONAL ASSESSMENT: PAIN_FUNCTIONAL_ASSESSMENT: 0-10

## 2025-03-08 NOTE — ED PROVIDER NOTES
[facility]  EMERGENCY DEPARTMENT ENCOUNTER      Pt Name: Jennifer Baltazar  MRN: [id number]  Birthdate 1990  Date of evaluation: 3/8/2025  Provider: Alberta Raza DO    CHIEF COMPLAINT       Chief Complaint   Patient presents with    Foot Pain       PMH   Past Medical History:   Diagnosis Date    Chlamydia 05/07/2019    per pt    COVID-19 01/2021    recovered at home    Diabetes mellitus (HCC) 02/28/2012    Type 2 diabetes mellitus without complication, unspecified whether long term insulin use (HCC)    Hypercholesterolemia     Migraine     Mild depression 7/19/2012    Pap smear abnormality of cervix/human papillomavirus (HPV) positive 03/04/2020, 4/20/2011    Postpartum depression 7/19/2012    Preeclampsia     Thrombocytosis 3/1/2019    Trichomonas infection 05/22/2020    Vitamin D deficiency          MDM:   Vitals:    Vitals:    03/08/25 0807   BP: 131/73   Pulse: 85   Resp: 16   Temp: 97.9 °F (36.6 °C)   SpO2: 99%           This is a 34 y.o. female with pmhx depression, diabetes, migrianes, HLD who presents today for cc of left foot pain . Patient states she went on a long walk a few weeks ago, and since then has had pain in the bottom of her sole on the left foot. This has happened to her before, but usually goes away and she feels this time it is persisting. Rates her pain 7/10 on the pain scale, throbbing, worse when stepping on the foot and better at rest. Denies numbness, weakness, trauma, injury, rash, paresthesias. Already took percocet and advil prior to coming in and states she is just here for an X-ray.      On arrival VS stable.     Physical Exam  General: Alert, no acute distress  HEENT: Normocephalic, atraumatic.   Neck: in soft c collar  Cardio: Heart regular rate and regular rhythm  Lungs: no respiratory distress   MSK:ROM normal, no LE edema, cap refill <2seconds. L foot with mild tenderness in the plantar fascia without overlaying skin changes, no wounds or injury  Trichomonas infection 2020    Vitamin D deficiency        SURGICAL HISTORY       Past Surgical History:   Procedure Laterality Date     DELIVERY ONLY      c/s for LGA      SECTION  ;2016    x2       CURRENT MEDICATIONS       Previous Medications    ACCU-CHEK FASTCLIX LANCETS MISC    1 each by Does not apply route 3 times daily    ACETAMINOPHEN (TYLENOL) 500 MG TABLET    Take 1 tablet by mouth 4 times daily as needed for Pain    BLOOD GLUCOSE MONITORING SUPPL (ACCU-CHEK GUIDE) W/DEVICE KIT    1 kit by Does not apply route in the morning, at noon, and at bedtime    BLOOD GLUCOSE TEST STRIPS (ACCU-CHEK GUIDE) STRIP    1 each by In Vitro route daily As needed.    IBUPROFEN (ADVIL;MOTRIN) 600 MG TABLET    Take 1 tablet by mouth 4 times daily as needed for Pain    INSULIN ASPART (NOVOLOG) 100 UNIT/ML INJECTION PEN    INJECT 30 TO 35 UNITS THREE TIMES A DAY, BEFORE MEALS    INSULIN GLARGINE (LANTUS SOLOSTAR) 100 UNIT/ML INJECTION PEN    INJECT 25 UNITS UNDER THE SKIN IN THE EVENING    INSULIN SYRINGE-NEEDLE U-100 (BD INSULIN SYRINGE U/F) 31G X 516\" 0.3 ML MISC    Inject 1 each into the skin 3 times daily    LANCETS MISC    Test blood glucose 4 times daily Dx Code: E11.65    LETROZOLE (FEMARA) 2.5 MG TABLET    Take one tablet daily for 5 days starting day five after onset of menses    OMEPRAZOLE (PRILOSEC) 40 MG DELAYED RELEASE CAPSULE    Take 1 capsule by mouth 2 times daily    ONDANSETRON (ZOFRAN-ODT) 4 MG DISINTEGRATING TABLET    Take 1 tablet by mouth 3 times daily as needed for Nausea or Vomiting    OXYCODONE-ACETAMINOPHEN (PERCOCET) 5-325 MG PER TABLET    Take 1 tablet by mouth every 4 hours as needed for Pain. Max Daily Amount: 6 tablets       ALLERGIES     Patient has no known allergies.    FAMILY HISTORY       Family History   Problem Relation Age of Onset    Diabetes Brother     Dementia Maternal Grandfather     Diabetes Mother         Type II    Hypertension Mother     Coronary Art

## 2025-03-13 ENCOUNTER — HOSPITAL ENCOUNTER (EMERGENCY)
Facility: HOSPITAL | Age: 35
Discharge: HOME | End: 2025-03-13
Attending: EMERGENCY MEDICINE
Payer: MEDICAID

## 2025-03-13 VITALS
SYSTOLIC BLOOD PRESSURE: 123 MMHG | HEART RATE: 71 BPM | HEIGHT: 60 IN | RESPIRATION RATE: 16 BRPM | TEMPERATURE: 97.3 F | WEIGHT: 160 LBS | BODY MASS INDEX: 31.41 KG/M2 | DIASTOLIC BLOOD PRESSURE: 72 MMHG | OXYGEN SATURATION: 98 %

## 2025-03-13 DIAGNOSIS — N30.00 ACUTE CYSTITIS WITHOUT HEMATURIA: Primary | ICD-10-CM

## 2025-03-13 DIAGNOSIS — N89.8 VAGINAL DISCHARGE: ICD-10-CM

## 2025-03-13 LAB
APPEARANCE UR: CLEAR
BILIRUB UR QL: NEGATIVE
CLUE CELLS VAG QL WET PREP: NORMAL
COLOR UR: YELLOW
GLUCOSE UR STRIP.AUTO-MCNC: NEGATIVE MG/DL
HCG UR QL: NEGATIVE
HGB UR QL STRIP: NEGATIVE
KETONES UR QL STRIP.AUTO: NEGATIVE MG/DL
KOH PREP SPEC: NORMAL
LEUKOCYTE ESTERASE UR QL STRIP.AUTO: ABNORMAL
NITRITE UR QL STRIP.AUTO: NEGATIVE
PH UR STRIP: 6 (ref 5–8)
PROT UR STRIP-MCNC: NEGATIVE MG/DL
SERVICE CMNT-IMP: NORMAL
SP GR UR REFRACTOMETRY: ABNORMAL (ref 1–1.03)
T VAGINALIS VAG QL WET PREP: NORMAL
UROBILINOGEN UR QL STRIP.AUTO: 0.2 EU/DL (ref 0.2–1)
YEAST WET PREP: NORMAL

## 2025-03-13 PROCEDURE — 99283 EMERGENCY DEPT VISIT LOW MDM: CPT

## 2025-03-13 PROCEDURE — 81025 URINE PREGNANCY TEST: CPT

## 2025-03-13 PROCEDURE — 87491 CHLMYD TRACH DNA AMP PROBE: CPT

## 2025-03-13 PROCEDURE — 87591 N.GONORRHOEAE DNA AMP PROB: CPT

## 2025-03-13 PROCEDURE — 81002 URINALYSIS NONAUTO W/O SCOPE: CPT

## 2025-03-13 PROCEDURE — 87210 SMEAR WET MOUNT SALINE/INK: CPT

## 2025-03-13 RX ORDER — CEPHALEXIN 500 MG/1
500 CAPSULE ORAL 3 TIMES DAILY
Qty: 15 CAPSULE | Refills: 0 | Status: SHIPPED | OUTPATIENT
Start: 2025-03-13 | End: 2025-03-18

## 2025-03-13 ASSESSMENT — PAIN SCALES - GENERAL: PAINLEVEL_OUTOF10: 0

## 2025-03-13 ASSESSMENT — PAIN - FUNCTIONAL ASSESSMENT: PAIN_FUNCTIONAL_ASSESSMENT: NONE - DENIES PAIN

## 2025-03-13 NOTE — ED NOTES
The patient was discharged home by provider in stable condition. The patient is alert and oriented, in no respiratory distress and discharge vital signs obtained. The patient's diagnosis, condition and treatment were explained. The patient expressed understanding. One prescription given. No work/school note given. A discharge plan has been developed. A  was not involved in the process. Aftercare instructions were given.  Pt ambulatory out of the ED.

## 2025-03-13 NOTE — ED PROVIDER NOTES
[facility]  EMERGENCY DEPARTMENT ENCOUNTER      Pt Name: Jennifer Baltazar  MRN: [id number]  Birthdate 1990  Date of evaluation: 3/13/2025  Provider: Sujit Garcia MD    CHIEF COMPLAINT       Chief Complaint   Patient presents with    Dysuria         HISTORY OF PRESENT ILLNESS   (Location/Symptom, Timing/Onset, Context/Setting, Quality, Duration, Modifying Factors, Severity)  Note limiting factors.   34-year-old with a history of diabetes, hyperlipidemia, migraines, depression.  She presents with a 1 week history of dysuria.  She reports some discomfort with urination for the past week.  She also complains of a yellow vaginal discharge.  She has had some mild lower abdominal pain.  No current nausea or vomiting.  She states that she feels \"exhausted.\"  She is concerned about the possibility of a urinary tract infection versus bacterial vaginosis.  She denies any new sexual partners.          Review of External Medical Records:     Nursing Notes were reviewed.    REVIEW OF SYSTEMS    (2-9 systems for level 4, 10 or more for level 5)     Review of Systems    Except as noted above the remainder of the review of systems was reviewed and negative.       PAST MEDICAL HISTORY     Past Medical History:   Diagnosis Date    Chlamydia 2019    per pt    COVID-19 2021    recovered at home    Diabetes mellitus (HCC) 2012    Type 2 diabetes mellitus without complication, unspecified whether long term insulin use (HCC)    Hypercholesterolemia     Migraine     Mild depression 2012    Pap smear abnormality of cervix/human papillomavirus (HPV) positive 2020, 2011    Postpartum depression 2012    Preeclampsia     Thrombocytosis 3/1/2019    Trichomonas infection 2020    Vitamin D deficiency          SURGICAL HISTORY       Past Surgical History:   Procedure Laterality Date     DELIVERY ONLY      c/s for LGA      SECTION  ;2016    x2

## 2025-03-14 LAB
C TRACH DNA SPEC QL NAA+PROBE: NEGATIVE
N GONORRHOEA DNA SPEC QL NAA+PROBE: NEGATIVE
SAMPLE TYPE: NORMAL
SERVICE CMNT-IMP: NORMAL
SPECIMEN SOURCE: NORMAL

## 2025-03-27 ENCOUNTER — HOSPITAL ENCOUNTER (EMERGENCY)
Facility: HOSPITAL | Age: 35
Discharge: HOME | End: 2025-03-27
Attending: EMERGENCY MEDICINE
Payer: MEDICAID

## 2025-03-27 ENCOUNTER — APPOINTMENT (OUTPATIENT)
Facility: HOSPITAL | Age: 35
End: 2025-03-27
Payer: MEDICAID

## 2025-03-27 VITALS
SYSTOLIC BLOOD PRESSURE: 131 MMHG | HEIGHT: 63 IN | DIASTOLIC BLOOD PRESSURE: 74 MMHG | BODY MASS INDEX: 28.88 KG/M2 | RESPIRATION RATE: 17 BRPM | WEIGHT: 163 LBS | HEART RATE: 72 BPM | OXYGEN SATURATION: 99 % | TEMPERATURE: 98.3 F

## 2025-03-27 DIAGNOSIS — R07.9 CHEST PAIN, UNSPECIFIED TYPE: Primary | ICD-10-CM

## 2025-03-27 LAB
ALBUMIN SERPL-MCNC: 3.7 G/DL (ref 3.5–5)
ALBUMIN/GLOB SERPL: 1 (ref 1.1–2.2)
ALP SERPL-CCNC: 71 U/L (ref 45–117)
ALT SERPL-CCNC: 31 U/L (ref 12–78)
ANION GAP SERPL CALC-SCNC: 7 MMOL/L (ref 2–12)
AST SERPL-CCNC: 19 U/L (ref 15–37)
BASOPHILS # BLD: 0.05 K/UL (ref 0–0.1)
BASOPHILS NFR BLD: 0.7 % (ref 0–1)
BILIRUB SERPL-MCNC: 0.2 MG/DL (ref 0.2–1)
BUN SERPL-MCNC: 11 MG/DL (ref 6–20)
BUN/CREAT SERPL: 17 (ref 12–20)
CALCIUM SERPL-MCNC: 8.9 MG/DL (ref 8.5–10.1)
CHLORIDE SERPL-SCNC: 105 MMOL/L (ref 97–108)
CO2 SERPL-SCNC: 30 MMOL/L (ref 21–32)
CREAT SERPL-MCNC: 0.65 MG/DL (ref 0.55–1.02)
DIFFERENTIAL METHOD BLD: ABNORMAL
EOSINOPHIL # BLD: 0.11 K/UL (ref 0–0.4)
EOSINOPHIL NFR BLD: 1.6 % (ref 0–7)
ERYTHROCYTE [DISTWIDTH] IN BLOOD BY AUTOMATED COUNT: 13.9 % (ref 11.5–14.5)
FLUAV RNA SPEC QL NAA+PROBE: NOT DETECTED
FLUBV RNA SPEC QL NAA+PROBE: NOT DETECTED
GLOBULIN SER CALC-MCNC: 3.7 G/DL (ref 2–4)
GLUCOSE SERPL-MCNC: 73 MG/DL (ref 65–100)
HCG SERPL QL: NEGATIVE
HCG UR QL: NEGATIVE
HCT VFR BLD AUTO: 40.3 % (ref 35–47)
HGB BLD-MCNC: 12.9 G/DL (ref 11.5–16)
IMM GRANULOCYTES # BLD AUTO: 0.01 K/UL (ref 0–0.04)
IMM GRANULOCYTES NFR BLD AUTO: 0.1 % (ref 0–0.5)
LYMPHOCYTES # BLD: 3.9 K/UL (ref 0.8–3.5)
LYMPHOCYTES NFR BLD: 56.5 % (ref 12–49)
MCH RBC QN AUTO: 27.7 PG (ref 26–34)
MCHC RBC AUTO-ENTMCNC: 32 G/DL (ref 30–36.5)
MCV RBC AUTO: 86.7 FL (ref 80–99)
MONOCYTES # BLD: 0.54 K/UL (ref 0–1)
MONOCYTES NFR BLD: 7.8 % (ref 5–13)
NEUTS SEG # BLD: 2.29 K/UL (ref 1.8–8)
NEUTS SEG NFR BLD: 33.3 % (ref 32–75)
NRBC # BLD: 0 K/UL (ref 0–0.01)
NRBC BLD-RTO: 0 PER 100 WBC
PLATELET # BLD AUTO: 471 K/UL (ref 150–400)
PMV BLD AUTO: 8.9 FL (ref 8.9–12.9)
POTASSIUM SERPL-SCNC: 3.7 MMOL/L (ref 3.5–5.1)
PROT SERPL-MCNC: 7.4 G/DL (ref 6.4–8.2)
RBC # BLD AUTO: 4.65 M/UL (ref 3.8–5.2)
SARS-COV-2 RNA RESP QL NAA+PROBE: NOT DETECTED
SODIUM SERPL-SCNC: 142 MMOL/L (ref 136–145)
SOURCE: NORMAL
TROPONIN I SERPL HS-MCNC: <4 NG/L (ref 0–51)
WBC # BLD AUTO: 6.9 K/UL (ref 3.6–11)

## 2025-03-27 PROCEDURE — 99285 EMERGENCY DEPT VISIT HI MDM: CPT

## 2025-03-27 PROCEDURE — 85025 COMPLETE CBC W/AUTO DIFF WBC: CPT

## 2025-03-27 PROCEDURE — 6360000002 HC RX W HCPCS: Performed by: EMERGENCY MEDICINE

## 2025-03-27 PROCEDURE — 96374 THER/PROPH/DIAG INJ IV PUSH: CPT

## 2025-03-27 PROCEDURE — 36415 COLL VENOUS BLD VENIPUNCTURE: CPT

## 2025-03-27 PROCEDURE — 80053 COMPREHEN METABOLIC PANEL: CPT

## 2025-03-27 PROCEDURE — 71046 X-RAY EXAM CHEST 2 VIEWS: CPT

## 2025-03-27 PROCEDURE — 84484 ASSAY OF TROPONIN QUANT: CPT

## 2025-03-27 PROCEDURE — 93005 ELECTROCARDIOGRAM TRACING: CPT | Performed by: EMERGENCY MEDICINE

## 2025-03-27 PROCEDURE — 87636 SARSCOV2 & INF A&B AMP PRB: CPT

## 2025-03-27 PROCEDURE — 84703 CHORIONIC GONADOTROPIN ASSAY: CPT

## 2025-03-27 PROCEDURE — 81025 URINE PREGNANCY TEST: CPT

## 2025-03-27 RX ORDER — KETOROLAC TROMETHAMINE 10 MG/1
10 TABLET, FILM COATED ORAL EVERY 6 HOURS PRN
Qty: 12 TABLET | Refills: 0 | Status: SHIPPED | OUTPATIENT
Start: 2025-03-27

## 2025-03-27 RX ORDER — KETOROLAC TROMETHAMINE 30 MG/ML
30 INJECTION, SOLUTION INTRAMUSCULAR; INTRAVENOUS ONCE
Status: COMPLETED | OUTPATIENT
Start: 2025-03-27 | End: 2025-03-27

## 2025-03-27 RX ADMIN — KETOROLAC TROMETHAMINE 30 MG: 30 INJECTION, SOLUTION INTRAMUSCULAR at 19:53

## 2025-03-27 ASSESSMENT — PAIN DESCRIPTION - LOCATION
LOCATION_4: ARM
LOCATION: CHEST
LOCATION: CHEST
LOCATION_3: BACK
LOCATION_2: NECK

## 2025-03-27 ASSESSMENT — PAIN DESCRIPTION - DESCRIPTORS
DESCRIPTORS_2: ACHING
DESCRIPTORS: ACHING
DESCRIPTORS: SHARP
DESCRIPTORS_3: ACHING

## 2025-03-27 ASSESSMENT — ENCOUNTER SYMPTOMS
BACK PAIN: 1
SHORTNESS OF BREATH: 0
WHEEZING: 0
ABDOMINAL PAIN: 0
NAUSEA: 0
VOMITING: 0
COUGH: 0

## 2025-03-27 ASSESSMENT — PAIN DESCRIPTION - FREQUENCY
FREQUENCY: CONTINUOUS
FREQUENCY: CONTINUOUS

## 2025-03-27 ASSESSMENT — PAIN DESCRIPTION - INTENSITY
RATING_3: 6
RATING_2: 6
RATING_4: 6

## 2025-03-27 ASSESSMENT — PAIN DESCRIPTION - ORIENTATION
ORIENTATION: MID
ORIENTATION: MID
ORIENTATION_4: RIGHT;LEFT

## 2025-03-27 ASSESSMENT — PAIN DESCRIPTION - PAIN TYPE
TYPE: ACUTE PAIN
TYPE: ACUTE PAIN

## 2025-03-27 ASSESSMENT — PAIN DESCRIPTION - ONSET: ONSET: SUDDEN

## 2025-03-27 ASSESSMENT — PAIN SCALES - GENERAL: PAINLEVEL_OUTOF10: 5

## 2025-03-27 ASSESSMENT — PAIN - FUNCTIONAL ASSESSMENT: PAIN_FUNCTIONAL_ASSESSMENT: 0-10
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
## 2025-03-28 LAB
EKG ATRIAL RATE: 69 BPM
EKG DIAGNOSIS: NORMAL
EKG P AXIS: 63 DEGREES
EKG P-R INTERVAL: 152 MS
EKG Q-T INTERVAL: 378 MS
EKG QRS DURATION: 88 MS
EKG QTC CALCULATION (BAZETT): 405 MS
EKG R AXIS: 164 DEGREES
EKG T AXIS: 53 DEGREES
EKG VENTRICULAR RATE: 69 BPM

## 2025-03-28 PROCEDURE — 93010 ELECTROCARDIOGRAM REPORT: CPT | Performed by: INTERNAL MEDICINE

## 2025-03-28 NOTE — ED NOTES
The patient was discharged home by Dr. North and isabel Manzo in stable condition. The patient is alert and oriented, is in no respiratory distress and has vital signs within normal limits . The patient's diagnosis, condition and treatment were explained to patient. The patient expressed understanding. No prescriptions given to pt. No work/school note given to pt. A discharge plan has been developed. A  was not involved in the process. Aftercare instructions were given to the patient. Pt's saline lock removed without complications.

## 2025-05-13 ENCOUNTER — APPOINTMENT (OUTPATIENT)
Facility: HOSPITAL | Age: 35
End: 2025-05-13
Payer: MEDICAID

## 2025-05-13 ENCOUNTER — HOSPITAL ENCOUNTER (EMERGENCY)
Facility: HOSPITAL | Age: 35
Discharge: HOME | End: 2025-05-13
Attending: EMERGENCY MEDICINE
Payer: MEDICAID

## 2025-05-13 VITALS
DIASTOLIC BLOOD PRESSURE: 62 MMHG | SYSTOLIC BLOOD PRESSURE: 127 MMHG | HEART RATE: 86 BPM | TEMPERATURE: 98.3 F | WEIGHT: 163 LBS | RESPIRATION RATE: 16 BRPM | BODY MASS INDEX: 28.88 KG/M2 | HEIGHT: 63 IN | OXYGEN SATURATION: 100 %

## 2025-05-13 DIAGNOSIS — E87.1 HYPONATREMIA: Primary | ICD-10-CM

## 2025-05-13 DIAGNOSIS — N39.0 URINARY TRACT INFECTION WITH HEMATURIA, SITE UNSPECIFIED: ICD-10-CM

## 2025-05-13 DIAGNOSIS — R31.9 URINARY TRACT INFECTION WITH HEMATURIA, SITE UNSPECIFIED: ICD-10-CM

## 2025-05-13 DIAGNOSIS — K59.00 CONSTIPATION, UNSPECIFIED CONSTIPATION TYPE: ICD-10-CM

## 2025-05-13 LAB
ALBUMIN SERPL-MCNC: 3.1 G/DL (ref 3.5–5)
ALBUMIN/GLOB SERPL: 0.9 (ref 1.1–2.2)
ALP SERPL-CCNC: 67 U/L (ref 45–117)
ALT SERPL-CCNC: 30 U/L (ref 12–78)
ANION GAP SERPL CALC-SCNC: 7 MMOL/L (ref 2–12)
APPEARANCE UR: ABNORMAL
AST SERPL-CCNC: 17 U/L (ref 15–37)
BACTERIA URNS QL MICRO: ABNORMAL /HPF
BASOPHILS # BLD: 0.03 K/UL (ref 0–0.1)
BASOPHILS NFR BLD: 0.3 % (ref 0–1)
BILIRUB SERPL-MCNC: 0.3 MG/DL (ref 0.2–1)
BILIRUB UR QL: NEGATIVE
BUN SERPL-MCNC: 23 MG/DL (ref 6–20)
BUN/CREAT SERPL: 32 (ref 12–20)
CALCIUM SERPL-MCNC: 7.5 MG/DL (ref 8.5–10.1)
CHLORIDE SERPL-SCNC: 97 MMOL/L (ref 97–108)
CO2 SERPL-SCNC: 25 MMOL/L (ref 21–32)
COLOR UR: ABNORMAL
COMMENT:: NORMAL
CREAT SERPL-MCNC: 0.71 MG/DL (ref 0.55–1.02)
DIFFERENTIAL METHOD BLD: ABNORMAL
EOSINOPHIL # BLD: 0.01 K/UL (ref 0–0.4)
EOSINOPHIL NFR BLD: 0.1 % (ref 0–7)
EPITH CASTS URNS QL MICRO: ABNORMAL /LPF
ERYTHROCYTE [DISTWIDTH] IN BLOOD BY AUTOMATED COUNT: 13.1 % (ref 11.5–14.5)
GLOBULIN SER CALC-MCNC: 3.3 G/DL (ref 2–4)
GLUCOSE SERPL-MCNC: 162 MG/DL (ref 65–100)
GLUCOSE UR STRIP.AUTO-MCNC: NEGATIVE MG/DL
HCG UR QL: NEGATIVE
HCT VFR BLD AUTO: 39.2 % (ref 35–47)
HGB BLD-MCNC: 12.9 G/DL (ref 11.5–16)
HGB UR QL STRIP: ABNORMAL
IMM GRANULOCYTES # BLD AUTO: 0.04 K/UL (ref 0–0.04)
IMM GRANULOCYTES NFR BLD AUTO: 0.4 % (ref 0–0.5)
KETONES UR QL STRIP.AUTO: NEGATIVE MG/DL
LEUKOCYTE ESTERASE UR QL STRIP.AUTO: ABNORMAL
LIPASE SERPL-CCNC: 16 U/L (ref 13–75)
LYMPHOCYTES # BLD: 2.12 K/UL (ref 0.8–3.5)
LYMPHOCYTES NFR BLD: 19.8 % (ref 12–49)
MAGNESIUM SERPL-MCNC: 1.5 MG/DL (ref 1.6–2.4)
MCH RBC QN AUTO: 28.2 PG (ref 26–34)
MCHC RBC AUTO-ENTMCNC: 32.9 G/DL (ref 30–36.5)
MCV RBC AUTO: 85.6 FL (ref 80–99)
MONOCYTES # BLD: 0.51 K/UL (ref 0–1)
MONOCYTES NFR BLD: 4.8 % (ref 5–13)
NEUTS SEG # BLD: 8.01 K/UL (ref 1.8–8)
NEUTS SEG NFR BLD: 74.6 % (ref 32–75)
NITRITE UR QL STRIP.AUTO: NEGATIVE
NRBC # BLD: 0 K/UL (ref 0–0.01)
NRBC BLD-RTO: 0 PER 100 WBC
PH UR STRIP: 6 (ref 5–8)
PLATELET # BLD AUTO: 440 K/UL (ref 150–400)
PMV BLD AUTO: 9.3 FL (ref 8.9–12.9)
POTASSIUM SERPL-SCNC: 3.9 MMOL/L (ref 3.5–5.1)
PROT SERPL-MCNC: 6.4 G/DL (ref 6.4–8.2)
PROT UR STRIP-MCNC: ABNORMAL MG/DL
RBC # BLD AUTO: 4.58 M/UL (ref 3.8–5.2)
RBC #/AREA URNS HPF: ABNORMAL /HPF (ref 0–5)
SODIUM SERPL-SCNC: 129 MMOL/L (ref 136–145)
SP GR UR REFRACTOMETRY: 1.01 (ref 1–1.03)
SPECIMEN HOLD: NORMAL
UROBILINOGEN UR QL STRIP.AUTO: 0.2 EU/DL (ref 0.2–1)
WBC # BLD AUTO: 10.7 K/UL (ref 3.6–11)
WBC URNS QL MICRO: ABNORMAL /HPF (ref 0–4)

## 2025-05-13 PROCEDURE — 96375 TX/PRO/DX INJ NEW DRUG ADDON: CPT

## 2025-05-13 PROCEDURE — 96361 HYDRATE IV INFUSION ADD-ON: CPT

## 2025-05-13 PROCEDURE — 85025 COMPLETE CBC W/AUTO DIFF WBC: CPT

## 2025-05-13 PROCEDURE — 6360000004 HC RX CONTRAST MEDICATION: Performed by: EMERGENCY MEDICINE

## 2025-05-13 PROCEDURE — 6360000002 HC RX W HCPCS: Performed by: EMERGENCY MEDICINE

## 2025-05-13 PROCEDURE — 83735 ASSAY OF MAGNESIUM: CPT

## 2025-05-13 PROCEDURE — 96374 THER/PROPH/DIAG INJ IV PUSH: CPT

## 2025-05-13 PROCEDURE — 80053 COMPREHEN METABOLIC PANEL: CPT

## 2025-05-13 PROCEDURE — 74177 CT ABD & PELVIS W/CONTRAST: CPT

## 2025-05-13 PROCEDURE — 83690 ASSAY OF LIPASE: CPT

## 2025-05-13 PROCEDURE — 99285 EMERGENCY DEPT VISIT HI MDM: CPT

## 2025-05-13 PROCEDURE — 36415 COLL VENOUS BLD VENIPUNCTURE: CPT

## 2025-05-13 PROCEDURE — 2580000003 HC RX 258: Performed by: EMERGENCY MEDICINE

## 2025-05-13 PROCEDURE — 81025 URINE PREGNANCY TEST: CPT

## 2025-05-13 PROCEDURE — 81001 URINALYSIS AUTO W/SCOPE: CPT

## 2025-05-13 RX ORDER — CEPHALEXIN 500 MG/1
500 CAPSULE ORAL 2 TIMES DAILY
Qty: 20 CAPSULE | Refills: 0 | Status: SHIPPED | OUTPATIENT
Start: 2025-05-13 | End: 2025-05-23

## 2025-05-13 RX ORDER — DIPHENHYDRAMINE HYDROCHLORIDE 50 MG/ML
25 INJECTION, SOLUTION INTRAMUSCULAR; INTRAVENOUS
Status: COMPLETED | OUTPATIENT
Start: 2025-05-13 | End: 2025-05-13

## 2025-05-13 RX ORDER — DICYCLOMINE HCL 20 MG
20 TABLET ORAL
Qty: 20 TABLET | Refills: 0 | Status: SHIPPED | OUTPATIENT
Start: 2025-05-13

## 2025-05-13 RX ORDER — IOPAMIDOL 755 MG/ML
100 INJECTION, SOLUTION INTRAVASCULAR
Status: COMPLETED | OUTPATIENT
Start: 2025-05-13 | End: 2025-05-13

## 2025-05-13 RX ORDER — 0.9 % SODIUM CHLORIDE 0.9 %
1000 INTRAVENOUS SOLUTION INTRAVENOUS ONCE
Status: COMPLETED | OUTPATIENT
Start: 2025-05-13 | End: 2025-05-13

## 2025-05-13 RX ORDER — ONDANSETRON 2 MG/ML
8 INJECTION INTRAMUSCULAR; INTRAVENOUS ONCE
Status: COMPLETED | OUTPATIENT
Start: 2025-05-13 | End: 2025-05-13

## 2025-05-13 RX ORDER — KETOROLAC TROMETHAMINE 30 MG/ML
30 INJECTION, SOLUTION INTRAMUSCULAR; INTRAVENOUS
Status: COMPLETED | OUTPATIENT
Start: 2025-05-13 | End: 2025-05-13

## 2025-05-13 RX ORDER — POLYETHYLENE GLYCOL 3350 17 G/17G
POWDER, FOR SOLUTION ORAL
Qty: 1530 G | Refills: 1 | Status: SHIPPED | OUTPATIENT
Start: 2025-05-13

## 2025-05-13 RX ADMIN — IOPAMIDOL 100 ML: 755 INJECTION, SOLUTION INTRAVENOUS at 02:45

## 2025-05-13 RX ADMIN — SODIUM CHLORIDE 1000 ML: 0.9 INJECTION, SOLUTION INTRAVENOUS at 02:14

## 2025-05-13 RX ADMIN — KETOROLAC TROMETHAMINE 30 MG: 30 INJECTION, SOLUTION INTRAMUSCULAR at 02:20

## 2025-05-13 RX ADMIN — ONDANSETRON 8 MG: 2 INJECTION, SOLUTION INTRAMUSCULAR; INTRAVENOUS at 02:20

## 2025-05-13 RX ADMIN — DIPHENHYDRAMINE HYDROCHLORIDE 25 MG: 50 INJECTION INTRAMUSCULAR; INTRAVENOUS at 02:20

## 2025-05-13 NOTE — ED TRIAGE NOTES
Patient reports ambulatory to ER with cc nausea and acid reflux that started last night while going to sleep patient states she is also having some chest pain. Pt reports trying to go to the bathroom but unable to have a bowel movement.

## 2025-05-13 NOTE — ED PROVIDER NOTES
[facility]  EMERGENCY DEPARTMENT ENCOUNTER      Pt Name: Jennifer Baltazar  MRN: [id number]  Birthdate 1990  Date of evaluation: 5/13/2025  Provider: Myles Rosen MD    CHIEF COMPLAINT       Chief Complaint   Patient presents with    Fatigue    Nausea     X1 day         HISTORY OF PRESENT ILLNESS   (Location/Symptom, Timing/Onset, Context/Setting, Quality, Duration, Modifying Factors, Severity)  Note limiting factors.   35-year-old female, with a past medical history significant for type 1 diabetes migraine, depression, thrombocytosis, trichomonal infeTo get a urine significant level of the vascular right Tylenol prescription pending without I have already for evaluation for generalized abdominal pain accompanied by dry heaving and general malaise and weakness.  She denies any fever, cough or congestion, neck pain, chest pain or shortness of breath, diarrhea, constipation, dysuria, sick contact, unusual food sources, recent travel, vaginal discharge or bleeding, prior history of the same.      Presents to the ED      Review of External Medical Records:     Nursing Notes were reviewed.    REVIEW OF SYSTEMS    (2-9 systems for level 4, 10 or more for level 5)     Review of Systems   All other systems reviewed and are negative.      Except as noted above the remainder of the review of systems was reviewed and negative.       PAST MEDICAL HISTORY     Past Medical History:   Diagnosis Date    Chlamydia 05/07/2019    per pt    COVID-19 01/2021    recovered at home    Diabetes mellitus (HCC) 02/28/2012    Type 2 diabetes mellitus without complication, unspecified whether long term insulin use (HCC)    Hypercholesterolemia     Migraine     Mild depression 7/19/2012    Pap smear abnormality of cervix/human papillomavirus (HPV) positive 03/04/2020, 4/20/2011    Postpartum depression 7/19/2012    Preeclampsia     Thrombocytosis 3/1/2019    Trichomonas infection 05/22/2020    Vitamin D deficiency

## 2025-05-13 NOTE — ED NOTES
Patient tolerated soap cb enema well. She states she will hold the water in for as long as she can, then go try to have bowel movement

## 2025-06-24 ENCOUNTER — TELEPHONE (OUTPATIENT)
Age: 35
End: 2025-06-24

## 2025-06-24 NOTE — TELEPHONE ENCOUNTER
PT name and  verified    36 yo last ov/ae/pap 24  Former [facility] patient    PT calling stating she would like to schedule her sti testing. RN reviewed and relayed that she is past due for an annual and per  she needs a repeat pap:  Mickey Orellana MD  2024 10:34 AM EDT       Tell pt normal pap but HPV positive.  Repeat one year.     RN asked if PT was having any problems or sx at this time, PT denies having any sx or problems.  RN also relayed that  has retired and she needs to choose a continue care provider. PT states she would like to see whoever can see her the soonest to get her ae and pap done.  RN checked the schedule, availability on  at 130 pm, verified with CR and PT was placed on the schedule for her ae and pap.    PT verbalizes understanding with the plan of care and provider she will be seeing.

## 2025-06-24 NOTE — PROGRESS NOTES
Jennifer Baltazar is a 35 y.o. female returns for an annual exam     Chief Complaint   Patient presents with    Annual Exam       Patient's last menstrual period was 06/15/2025 (exact date).  Her periods are heavy in flow and often irregular with no apparent pattern.  She has dysmenorrhea.  Problems: STI testing  Birth Control: none.  Last Pap: Normal,HPV+ obtained 4/9/24  She does not have a history of EZE 2, 3 or cervical cancer.   With regard to the Gardisil vaccine, she has not received it yet      1. Have you been to the ER, urgent care clinic, or hospitalized since your last visit? No    2. Have you seen or consulted any other health care providers outside of the [facility] since your last visit? No    Examination chaperoned by Erin Owusu LPN.

## 2025-07-01 ENCOUNTER — OFFICE VISIT (OUTPATIENT)
Age: 35
End: 2025-07-01
Payer: MEDICAID

## 2025-07-01 ENCOUNTER — TRANSCRIBE ORDERS (OUTPATIENT)
Facility: HOSPITAL | Age: 35
End: 2025-07-01

## 2025-07-01 VITALS
WEIGHT: 165 LBS | SYSTOLIC BLOOD PRESSURE: 110 MMHG | BODY MASS INDEX: 29.23 KG/M2 | DIASTOLIC BLOOD PRESSURE: 68 MMHG | RESPIRATION RATE: 22 BRPM | OXYGEN SATURATION: 98 % | HEIGHT: 63 IN | HEART RATE: 87 BPM

## 2025-07-01 DIAGNOSIS — E11.9 TYPE 2 DIABETES MELLITUS WITHOUT COMPLICATION, WITH LONG-TERM CURRENT USE OF INSULIN (HCC): ICD-10-CM

## 2025-07-01 DIAGNOSIS — Z01.419 WELL WOMAN EXAM: ICD-10-CM

## 2025-07-01 DIAGNOSIS — E28.2 PCOS (POLYCYSTIC OVARIAN SYNDROME): ICD-10-CM

## 2025-07-01 DIAGNOSIS — M54.2 CERVICAL PAIN: Primary | ICD-10-CM

## 2025-07-01 DIAGNOSIS — Z12.4 CERVICAL CANCER SCREENING: ICD-10-CM

## 2025-07-01 DIAGNOSIS — M54.2 CERVICALGIA: Primary | ICD-10-CM

## 2025-07-01 DIAGNOSIS — Z11.3 SCREENING FOR VENEREAL DISEASE: ICD-10-CM

## 2025-07-01 DIAGNOSIS — Z79.4 TYPE 2 DIABETES MELLITUS WITHOUT COMPLICATION, WITH LONG-TERM CURRENT USE OF INSULIN (HCC): ICD-10-CM

## 2025-07-01 DIAGNOSIS — N89.8 VAGINAL DISCHARGE: ICD-10-CM

## 2025-07-01 DIAGNOSIS — Z01.419 ENCOUNTER FOR GYNECOLOGICAL EXAMINATION: Primary | ICD-10-CM

## 2025-07-01 PROCEDURE — 99395 PREV VISIT EST AGE 18-39: CPT | Performed by: OBSTETRICS & GYNECOLOGY

## 2025-07-01 PROCEDURE — 99459 PELVIC EXAMINATION: CPT | Performed by: OBSTETRICS & GYNECOLOGY

## 2025-07-01 NOTE — PROGRESS NOTES
Jennifer Baltazar is a No obstetric history on file.,  35 y.o. female Black /  whose Patient's last menstrual period was 06/15/2025 (exact date).  was on 6/15/2025 who presents for her annual checkup. She is having problems - want STD testing.  Tested positive for chlamydia about 3 weeks ago and finished doxycycline about 2 weeks ago.  She has not been sexually active since that time.  She is seeing Dr. Ferris had been taking letrozole for ovulation induction.  Apparently she is moving onto the injections but has not paid for this yet.  She is a pre-existing diabetic.  Her last hemoglobin A1c was 6.9.  She is taking insulin and just started Trulicity.  She is being followed by her primary care physician.  We discussed the importance of having her A1c under 6 prior to conception to prevent anomalies.      Menstrual status:    Her periods are heavy in flow, irregular    She has dysmenorrhea.        Contraception:    The current method of family planning is none    Sexual history:    She  reports being sexually active and has had partner(s) who are male. She reports using the following method of birth control/protection: None.      Pap and Mammogram History:  Patient's last menstrual period was 06/15/2025 (exact date).  Her periods are heavy in flow and often irregular with no apparent pattern.  She has dysmenorrhea.  Problems: STI testing  Birth Control: none.  Last Pap: Normal,HPV+ obtained 4/9/24  She does not have a history of EZE 2, 3 or cervical cancer.   With regard to the Gardisil vaccine, she has not received it yet      The patient does not have a family history of breast cancer.  Family History   Problem Relation Age of Onset    Diabetes Brother     Dementia Maternal Grandfather     Diabetes Mother         Type II    Hypertension Mother     Coronary Art Dis Mother         stent placed    Coronary Art Dis Sister         stent placed    Hypertension Sister     Diabetes Sister 18        Type II

## 2025-07-05 LAB
A VAGINAE DNA VAG QL NAA+PROBE: NORMAL SCORE
BVAB2 DNA VAG QL NAA+PROBE: NORMAL SCORE
C ALBICANS DNA VAG QL NAA+PROBE: NEGATIVE
C GLABRATA DNA VAG QL NAA+PROBE: NEGATIVE
C TRACH RRNA SPEC QL NAA+PROBE: NEGATIVE
MEGA1 DNA VAG QL NAA+PROBE: NORMAL SCORE
N GONORRHOEA RRNA SPEC QL NAA+PROBE: NEGATIVE
SPECIMEN SOURCE: NORMAL
T VAGINALIS RRNA SPEC QL NAA+PROBE: NEGATIVE

## 2025-07-07 ENCOUNTER — RESULTS FOLLOW-UP (OUTPATIENT)
Age: 35
End: 2025-07-07

## 2025-07-10 ENCOUNTER — HOSPITAL ENCOUNTER (OUTPATIENT)
Facility: HOSPITAL | Age: 35
Discharge: HOME | End: 2025-07-13
Attending: ORTHOPAEDIC SURGERY
Payer: MEDICAID

## 2025-07-10 DIAGNOSIS — M54.2 CERVICAL PAIN: ICD-10-CM

## 2025-07-10 DIAGNOSIS — M54.2 CERVICALGIA: ICD-10-CM

## 2025-07-10 PROCEDURE — 72141 MRI NECK SPINE W/O DYE: CPT

## 2025-07-10 PROCEDURE — 72125 CT NECK SPINE W/O DYE: CPT

## 2025-07-19 ENCOUNTER — HOSPITAL ENCOUNTER (EMERGENCY)
Facility: HOSPITAL | Age: 35
Discharge: HOME | End: 2025-07-19
Attending: EMERGENCY MEDICINE
Payer: MEDICAID

## 2025-07-19 ENCOUNTER — APPOINTMENT (OUTPATIENT)
Facility: HOSPITAL | Age: 35
End: 2025-07-19
Payer: MEDICAID

## 2025-07-19 VITALS
OXYGEN SATURATION: 98 % | BODY MASS INDEX: 28.35 KG/M2 | WEIGHT: 160 LBS | DIASTOLIC BLOOD PRESSURE: 56 MMHG | TEMPERATURE: 98.2 F | SYSTOLIC BLOOD PRESSURE: 111 MMHG | HEIGHT: 63 IN | RESPIRATION RATE: 15 BRPM | HEART RATE: 79 BPM

## 2025-07-19 DIAGNOSIS — R10.10 PAIN OF UPPER ABDOMEN: Primary | ICD-10-CM

## 2025-07-19 DIAGNOSIS — R11.0 NAUSEA: ICD-10-CM

## 2025-07-19 DIAGNOSIS — N30.00 ACUTE CYSTITIS WITHOUT HEMATURIA: ICD-10-CM

## 2025-07-19 LAB
ALBUMIN SERPL-MCNC: 3.9 G/DL (ref 3.5–5)
ALBUMIN/GLOB SERPL: 1.1 (ref 1.1–2.2)
ALP SERPL-CCNC: 67 U/L (ref 45–117)
ALT SERPL-CCNC: 26 U/L (ref 12–78)
ANION GAP SERPL CALC-SCNC: 9 MMOL/L (ref 2–12)
APPEARANCE UR: CLEAR
AST SERPL-CCNC: 16 U/L (ref 15–37)
BACTERIA URNS QL MICRO: NEGATIVE /HPF
BASOPHILS # BLD: 0.06 K/UL (ref 0–0.1)
BASOPHILS NFR BLD: 1.2 % (ref 0–1)
BILIRUB SERPL-MCNC: 0.5 MG/DL (ref 0.2–1)
BILIRUB UR QL: NEGATIVE
BUN SERPL-MCNC: 14 MG/DL (ref 6–20)
BUN/CREAT SERPL: 18 (ref 12–20)
CALCIUM SERPL-MCNC: 9 MG/DL (ref 8.5–10.1)
CHLORIDE SERPL-SCNC: 103 MMOL/L (ref 97–108)
CO2 SERPL-SCNC: 28 MMOL/L (ref 21–32)
COLOR UR: ABNORMAL
CREAT SERPL-MCNC: 0.8 MG/DL (ref 0.55–1.02)
DIFFERENTIAL METHOD BLD: ABNORMAL
EOSINOPHIL # BLD: 0.14 K/UL (ref 0–0.4)
EOSINOPHIL NFR BLD: 2.8 % (ref 0–7)
EPITH CASTS URNS QL MICRO: ABNORMAL /LPF
ERYTHROCYTE [DISTWIDTH] IN BLOOD BY AUTOMATED COUNT: 12.5 % (ref 11.5–14.5)
GLOBULIN SER CALC-MCNC: 3.6 G/DL (ref 2–4)
GLUCOSE SERPL-MCNC: 158 MG/DL (ref 65–100)
GLUCOSE UR STRIP.AUTO-MCNC: NEGATIVE MG/DL
HCG UR QL: NEGATIVE
HCT VFR BLD AUTO: 40 % (ref 35–47)
HGB BLD-MCNC: 13.3 G/DL (ref 11.5–16)
HGB UR QL STRIP: ABNORMAL
IMM GRANULOCYTES # BLD AUTO: 0.01 K/UL (ref 0–0.04)
IMM GRANULOCYTES NFR BLD AUTO: 0.2 % (ref 0–0.5)
KETONES UR QL STRIP.AUTO: ABNORMAL MG/DL
LEUKOCYTE ESTERASE UR QL STRIP.AUTO: ABNORMAL
LIPASE SERPL-CCNC: 20 U/L (ref 13–75)
LYMPHOCYTES # BLD: 3.08 K/UL (ref 0.8–3.5)
LYMPHOCYTES NFR BLD: 60.9 % (ref 12–49)
MCH RBC QN AUTO: 28.2 PG (ref 26–34)
MCHC RBC AUTO-ENTMCNC: 33.3 G/DL (ref 30–36.5)
MCV RBC AUTO: 84.7 FL (ref 80–99)
MONOCYTES # BLD: 0.39 K/UL (ref 0–1)
MONOCYTES NFR BLD: 7.7 % (ref 5–13)
NEUTS SEG # BLD: 1.38 K/UL (ref 1.8–8)
NEUTS SEG NFR BLD: 27.2 % (ref 32–75)
NITRITE UR QL STRIP.AUTO: NEGATIVE
NRBC # BLD: 0 K/UL (ref 0–0.01)
NRBC BLD-RTO: 0 PER 100 WBC
PH UR STRIP: 6 (ref 5–8)
PLATELET # BLD AUTO: 490 K/UL (ref 150–400)
PMV BLD AUTO: 9.4 FL (ref 8.9–12.9)
POTASSIUM SERPL-SCNC: 3.7 MMOL/L (ref 3.5–5.1)
PROT SERPL-MCNC: 7.5 G/DL (ref 6.4–8.2)
PROT UR STRIP-MCNC: ABNORMAL MG/DL
RBC # BLD AUTO: 4.72 M/UL (ref 3.8–5.2)
RBC #/AREA URNS HPF: ABNORMAL /HPF (ref 0–5)
SODIUM SERPL-SCNC: 140 MMOL/L (ref 136–145)
SP GR UR REFRACTOMETRY: 1.02 (ref 1–1.03)
URINE CULTURE IF INDICATED: ABNORMAL
UROBILINOGEN UR QL STRIP.AUTO: 0.2 EU/DL (ref 0.2–1)
WBC # BLD AUTO: 5.1 K/UL (ref 3.6–11)
WBC URNS QL MICRO: ABNORMAL /HPF (ref 0–4)

## 2025-07-19 PROCEDURE — 85025 COMPLETE CBC W/AUTO DIFF WBC: CPT

## 2025-07-19 PROCEDURE — 80053 COMPREHEN METABOLIC PANEL: CPT

## 2025-07-19 PROCEDURE — 96374 THER/PROPH/DIAG INJ IV PUSH: CPT

## 2025-07-19 PROCEDURE — 83690 ASSAY OF LIPASE: CPT

## 2025-07-19 PROCEDURE — 81025 URINE PREGNANCY TEST: CPT

## 2025-07-19 PROCEDURE — 2580000003 HC RX 258: Performed by: EMERGENCY MEDICINE

## 2025-07-19 PROCEDURE — 76705 ECHO EXAM OF ABDOMEN: CPT

## 2025-07-19 PROCEDURE — 99285 EMERGENCY DEPT VISIT HI MDM: CPT

## 2025-07-19 PROCEDURE — 6360000004 HC RX CONTRAST MEDICATION: Performed by: EMERGENCY MEDICINE

## 2025-07-19 PROCEDURE — 36415 COLL VENOUS BLD VENIPUNCTURE: CPT

## 2025-07-19 PROCEDURE — 87086 URINE CULTURE/COLONY COUNT: CPT

## 2025-07-19 PROCEDURE — 96375 TX/PRO/DX INJ NEW DRUG ADDON: CPT

## 2025-07-19 PROCEDURE — 81001 URINALYSIS AUTO W/SCOPE: CPT

## 2025-07-19 PROCEDURE — 6360000002 HC RX W HCPCS: Performed by: EMERGENCY MEDICINE

## 2025-07-19 PROCEDURE — 74177 CT ABD & PELVIS W/CONTRAST: CPT

## 2025-07-19 RX ORDER — KETOROLAC TROMETHAMINE 10 MG/1
10 TABLET, FILM COATED ORAL EVERY 6 HOURS PRN
Qty: 20 TABLET | Refills: 0 | Status: SHIPPED | OUTPATIENT
Start: 2025-07-19

## 2025-07-19 RX ORDER — METOCLOPRAMIDE HYDROCHLORIDE 5 MG/ML
10 INJECTION INTRAMUSCULAR; INTRAVENOUS
Status: COMPLETED | OUTPATIENT
Start: 2025-07-19 | End: 2025-07-19

## 2025-07-19 RX ORDER — KETOROLAC TROMETHAMINE 30 MG/ML
15 INJECTION, SOLUTION INTRAMUSCULAR; INTRAVENOUS ONCE
Status: COMPLETED | OUTPATIENT
Start: 2025-07-19 | End: 2025-07-19

## 2025-07-19 RX ORDER — CEPHALEXIN 500 MG/1
500 CAPSULE ORAL 2 TIMES DAILY
Qty: 14 CAPSULE | Refills: 0 | Status: SHIPPED | OUTPATIENT
Start: 2025-07-19 | End: 2025-07-26

## 2025-07-19 RX ORDER — ONDANSETRON 2 MG/ML
4 INJECTION INTRAMUSCULAR; INTRAVENOUS ONCE
Status: COMPLETED | OUTPATIENT
Start: 2025-07-19 | End: 2025-07-19

## 2025-07-19 RX ORDER — DIPHENHYDRAMINE HYDROCHLORIDE 50 MG/ML
25 INJECTION, SOLUTION INTRAMUSCULAR; INTRAVENOUS
Status: COMPLETED | OUTPATIENT
Start: 2025-07-19 | End: 2025-07-19

## 2025-07-19 RX ORDER — 0.9 % SODIUM CHLORIDE 0.9 %
1000 INTRAVENOUS SOLUTION INTRAVENOUS ONCE
Status: COMPLETED | OUTPATIENT
Start: 2025-07-19 | End: 2025-07-19

## 2025-07-19 RX ORDER — ONDANSETRON 4 MG/1
4 TABLET, ORALLY DISINTEGRATING ORAL 3 TIMES DAILY PRN
Qty: 21 TABLET | Refills: 0 | Status: SHIPPED | OUTPATIENT
Start: 2025-07-19

## 2025-07-19 RX ORDER — IOPAMIDOL 755 MG/ML
100 INJECTION, SOLUTION INTRAVASCULAR
Status: COMPLETED | OUTPATIENT
Start: 2025-07-19 | End: 2025-07-19

## 2025-07-19 RX ADMIN — IOPAMIDOL 100 ML: 755 INJECTION, SOLUTION INTRAVENOUS at 10:27

## 2025-07-19 RX ADMIN — ONDANSETRON 4 MG: 2 INJECTION, SOLUTION INTRAMUSCULAR; INTRAVENOUS at 10:12

## 2025-07-19 RX ADMIN — METOCLOPRAMIDE 10 MG: 5 INJECTION, SOLUTION INTRAMUSCULAR; INTRAVENOUS at 10:44

## 2025-07-19 RX ADMIN — DIPHENHYDRAMINE HYDROCHLORIDE 25 MG: 50 INJECTION INTRAMUSCULAR; INTRAVENOUS at 10:44

## 2025-07-19 RX ADMIN — SODIUM CHLORIDE 1000 ML: 0.9 INJECTION, SOLUTION INTRAVENOUS at 10:11

## 2025-07-19 RX ADMIN — KETOROLAC TROMETHAMINE 15 MG: 30 INJECTION, SOLUTION INTRAMUSCULAR at 10:14

## 2025-07-19 ASSESSMENT — PAIN DESCRIPTION - LOCATION
LOCATION: ABDOMEN;BACK
LOCATION: BACK;ABDOMEN

## 2025-07-19 ASSESSMENT — PAIN DESCRIPTION - ORIENTATION
ORIENTATION: LOWER
ORIENTATION: LOWER

## 2025-07-19 ASSESSMENT — PAIN SCALES - GENERAL
PAINLEVEL_OUTOF10: 8
PAINLEVEL_OUTOF10: 8

## 2025-07-19 ASSESSMENT — PAIN - FUNCTIONAL ASSESSMENT
PAIN_FUNCTIONAL_ASSESSMENT: PREVENTS OR INTERFERES SOME ACTIVE ACTIVITIES AND ADLS
PAIN_FUNCTIONAL_ASSESSMENT: NONE - DENIES PAIN

## 2025-07-19 ASSESSMENT — PAIN DESCRIPTION - DESCRIPTORS
DESCRIPTORS: CRAMPING
DESCRIPTORS: CRAMPING

## 2025-07-19 ASSESSMENT — LIFESTYLE VARIABLES: HOW OFTEN DO YOU HAVE A DRINK CONTAINING ALCOHOL: NEVER

## 2025-07-19 NOTE — ED TRIAGE NOTES
Patient presents ambulatory to treatment area. Patient states she is on day 3 of her menstrual cycle. States she has always had intense pain with her cycles, but yesterday, began with nausea. States she is taking tylenol, motrin, and oxycodone for the cramps. States she had some abdominal pain prior to the start of her cycle with constipation that she had used enemas and miralax for. Last bowel movement was reportedly last night. Denies fevers.

## 2025-07-19 NOTE — DISCHARGE INSTRUCTIONS
You were seen in the emergency department for abdominal pain and nausea in the setting of your menstrual cycle.  The results of your tests were reassuring. Please take any medications prescribed at this visit as instructed.  Please follow-up with your PCP and OB/GYN or return to the emergency department if you experience a worsening of symptoms or any new symptoms that are concerning to you.

## 2025-07-19 NOTE — ED NOTES
Pt states \"feeling better pain down to 5/10\" up to the bathroom gait steady IVF complete Dr Li made aware.

## 2025-07-19 NOTE — ED PROVIDER NOTES
[facility] DEPARTMENT  EMERGENCY DEPARTMENT ENCOUNTER      Pt Name: Jennifer Baltazar  MRN: [id number]  Birthdate 1990  Date of evaluation: 7/19/2025  Provider: Yanick Li MD    CHIEF COMPLAINT       Chief Complaint   Patient presents with    Abdominal Pain    Pelvic Pain         HISTORY OF PRESENT ILLNESS   (Location/Symptom, Timing/Onset, Context/Setting, Quality, Duration, Modifying Factors, Severity)  Note limiting factors.   35-year-old female with PMHx of DM2, depression presents to the emergency department complaining of diffuse abdominal pain for the last few days, which she attributes to her menstrual cycle.  Patient notes that she typically has severe pain with her cycle but notes that it has been worse this time and has also been accompanied by nausea, without vomiting, which is new. States she is taking tylenol, motrin, and oxycodone for the cramps.  She also reports constipation at baseline for which she takes MiraLAX.  She notes that her last bowel movement was last night.  She denies fever, bloody stool, chest pain, shortness of breath.  She has no additional complaints at this time.    The history is provided by the patient.         Review of External Medical Records:     Nursing Notes were reviewed.    REVIEW OF SYSTEMS    (2-9 systems for level 4, 10 or more for level 5)     Review of Systems   Constitutional: Negative.    HENT: Negative.     Eyes: Negative.    Respiratory: Negative.     Cardiovascular: Negative.    Gastrointestinal:  Positive for abdominal pain, constipation and nausea.   Genitourinary: Negative.    Musculoskeletal: Negative.    Skin: Negative.    Neurological: Negative.    Psychiatric/Behavioral: Negative.         Except as noted above the remainder of the review of systems was reviewed and negative.       PAST MEDICAL HISTORY     Past Medical History:   Diagnosis Date    Chlamydia 05/07/2019    per pt    COVID-19 01/2021    recovered at home    Diabetes

## 2025-07-20 LAB
BACTERIA SPEC CULT: NORMAL
SERVICE CMNT-IMP: NORMAL

## 2025-08-22 ENCOUNTER — APPOINTMENT (OUTPATIENT)
Facility: HOSPITAL | Age: 35
End: 2025-08-22
Payer: MEDICAID

## 2025-08-22 ENCOUNTER — HOSPITAL ENCOUNTER (EMERGENCY)
Facility: HOSPITAL | Age: 35
Discharge: HOME | End: 2025-08-23
Attending: EMERGENCY MEDICINE
Payer: MEDICAID

## 2025-08-22 DIAGNOSIS — R11.2 NAUSEA AND VOMITING, UNSPECIFIED VOMITING TYPE: ICD-10-CM

## 2025-08-22 DIAGNOSIS — R10.84 GENERALIZED ABDOMINAL PAIN: Primary | ICD-10-CM

## 2025-08-22 DIAGNOSIS — R19.7 DIARRHEA OF PRESUMED INFECTIOUS ORIGIN: ICD-10-CM

## 2025-08-22 LAB
BASOPHILS # BLD: 0.02 K/UL (ref 0–0.1)
BASOPHILS NFR BLD: 0.3 % (ref 0–1)
DIFFERENTIAL METHOD BLD: NORMAL
EOSINOPHIL # BLD: 0.02 K/UL (ref 0–0.4)
EOSINOPHIL NFR BLD: 0.3 % (ref 0–7)
ERYTHROCYTE [DISTWIDTH] IN BLOOD BY AUTOMATED COUNT: 12.8 % (ref 11.5–14.5)
HCT VFR BLD AUTO: 35 % (ref 35–47)
HGB BLD-MCNC: 11.7 G/DL (ref 11.5–16)
IMM GRANULOCYTES # BLD AUTO: 0.03 K/UL (ref 0–0.04)
IMM GRANULOCYTES NFR BLD AUTO: 0.5 % (ref 0–0.5)
LYMPHOCYTES # BLD: 1.42 K/UL (ref 0.8–3.5)
LYMPHOCYTES NFR BLD: 24.1 % (ref 12–49)
MCH RBC QN AUTO: 28.3 PG (ref 26–34)
MCHC RBC AUTO-ENTMCNC: 33.4 G/DL (ref 30–36.5)
MCV RBC AUTO: 84.7 FL (ref 80–99)
MONOCYTES # BLD: 0.5 K/UL (ref 0–1)
MONOCYTES NFR BLD: 8.5 % (ref 5–13)
NEUTS SEG # BLD: 3.9 K/UL (ref 1.8–8)
NEUTS SEG NFR BLD: 66.3 % (ref 32–75)
NRBC # BLD: 0 K/UL (ref 0–0.01)
NRBC BLD-RTO: 0 PER 100 WBC
PLATELET # BLD AUTO: 381 K/UL (ref 150–400)
PMV BLD AUTO: 8.9 FL (ref 8.9–12.9)
RBC # BLD AUTO: 4.13 M/UL (ref 3.8–5.2)
WBC # BLD AUTO: 5.9 K/UL (ref 3.6–11)

## 2025-08-22 PROCEDURE — 96374 THER/PROPH/DIAG INJ IV PUSH: CPT

## 2025-08-22 PROCEDURE — 99284 EMERGENCY DEPT VISIT MOD MDM: CPT

## 2025-08-22 PROCEDURE — 71045 X-RAY EXAM CHEST 1 VIEW: CPT

## 2025-08-22 PROCEDURE — 96375 TX/PRO/DX INJ NEW DRUG ADDON: CPT

## 2025-08-22 PROCEDURE — 83690 ASSAY OF LIPASE: CPT

## 2025-08-22 PROCEDURE — 2580000003 HC RX 258: Performed by: EMERGENCY MEDICINE

## 2025-08-22 PROCEDURE — 87636 SARSCOV2 & INF A&B AMP PRB: CPT

## 2025-08-22 PROCEDURE — 6360000002 HC RX W HCPCS: Performed by: EMERGENCY MEDICINE

## 2025-08-22 PROCEDURE — 85025 COMPLETE CBC W/AUTO DIFF WBC: CPT

## 2025-08-22 PROCEDURE — 81001 URINALYSIS AUTO W/SCOPE: CPT

## 2025-08-22 PROCEDURE — 36415 COLL VENOUS BLD VENIPUNCTURE: CPT

## 2025-08-22 PROCEDURE — 80053 COMPREHEN METABOLIC PANEL: CPT

## 2025-08-22 RX ORDER — ONDANSETRON 2 MG/ML
4 INJECTION INTRAMUSCULAR; INTRAVENOUS ONCE
Status: COMPLETED | OUTPATIENT
Start: 2025-08-22 | End: 2025-08-22

## 2025-08-22 RX ORDER — 0.9 % SODIUM CHLORIDE 0.9 %
1000 INTRAVENOUS SOLUTION INTRAVENOUS ONCE
Status: COMPLETED | OUTPATIENT
Start: 2025-08-22 | End: 2025-08-23

## 2025-08-22 RX ORDER — KETOROLAC TROMETHAMINE 30 MG/ML
15 INJECTION, SOLUTION INTRAMUSCULAR; INTRAVENOUS ONCE
Status: COMPLETED | OUTPATIENT
Start: 2025-08-22 | End: 2025-08-22

## 2025-08-22 RX ADMIN — ONDANSETRON 4 MG: 2 INJECTION, SOLUTION INTRAMUSCULAR; INTRAVENOUS at 23:40

## 2025-08-22 RX ADMIN — KETOROLAC TROMETHAMINE 15 MG: 30 INJECTION, SOLUTION INTRAMUSCULAR at 23:40

## 2025-08-22 RX ADMIN — SODIUM CHLORIDE 1000 ML: 0.9 INJECTION, SOLUTION INTRAVENOUS at 23:40

## 2025-08-22 ASSESSMENT — PAIN DESCRIPTION - ORIENTATION: ORIENTATION: LOWER

## 2025-08-22 ASSESSMENT — PAIN DESCRIPTION - PAIN TYPE: TYPE: ACUTE PAIN

## 2025-08-22 ASSESSMENT — PAIN - FUNCTIONAL ASSESSMENT
PAIN_FUNCTIONAL_ASSESSMENT: 0-10
PAIN_FUNCTIONAL_ASSESSMENT: PREVENTS OR INTERFERES SOME ACTIVE ACTIVITIES AND ADLS

## 2025-08-22 ASSESSMENT — PAIN DESCRIPTION - LOCATION: LOCATION: ABDOMEN

## 2025-08-22 ASSESSMENT — PAIN DESCRIPTION - DESCRIPTORS: DESCRIPTORS: DISCOMFORT

## 2025-08-22 ASSESSMENT — PAIN SCALES - GENERAL: PAINLEVEL_OUTOF10: 9

## 2025-08-23 VITALS
OXYGEN SATURATION: 98 % | DIASTOLIC BLOOD PRESSURE: 61 MMHG | TEMPERATURE: 99.1 F | HEART RATE: 90 BPM | RESPIRATION RATE: 16 BRPM | SYSTOLIC BLOOD PRESSURE: 121 MMHG

## 2025-08-23 LAB
ALBUMIN SERPL-MCNC: 3.8 G/DL (ref 3.5–5.2)
ALBUMIN/GLOB SERPL: 1.1 (ref 1.1–2.2)
ALP SERPL-CCNC: 56 U/L (ref 35–104)
ALT SERPL-CCNC: 16 U/L (ref 10–35)
ANION GAP SERPL CALC-SCNC: 14 MMOL/L (ref 2–14)
APPEARANCE UR: CLEAR
AST SERPL-CCNC: 21 U/L (ref 10–35)
BACTERIA URNS QL MICRO: ABNORMAL /HPF
BILIRUB SERPL-MCNC: 0.3 MG/DL (ref 0–1.2)
BILIRUB UR QL: NEGATIVE
BUN SERPL-MCNC: 11 MG/DL (ref 6–20)
BUN/CREAT SERPL: 18 (ref 12–20)
CALCIUM SERPL-MCNC: 9.1 MG/DL (ref 8.6–10)
CHLORIDE SERPL-SCNC: 104 MMOL/L (ref 98–107)
CO2 SERPL-SCNC: 24 MMOL/L (ref 20–29)
COLOR UR: ABNORMAL
CREAT SERPL-MCNC: 0.64 MG/DL (ref 0.6–1)
EPITH CASTS URNS QL MICRO: ABNORMAL /LPF
FLUAV RNA SPEC QL NAA+PROBE: NOT DETECTED
FLUBV RNA SPEC QL NAA+PROBE: NOT DETECTED
GLOBULIN SER CALC-MCNC: 3.5 G/DL (ref 2–4)
GLUCOSE SERPL-MCNC: 59 MG/DL (ref 65–100)
GLUCOSE UR STRIP.AUTO-MCNC: NEGATIVE MG/DL
HCG UR QL: NEGATIVE
HGB UR QL STRIP: NEGATIVE
KETONES UR QL STRIP.AUTO: ABNORMAL MG/DL
LEUKOCYTE ESTERASE UR QL STRIP.AUTO: NEGATIVE
LIPASE SERPL-CCNC: 20 U/L (ref 13–60)
MUCOUS THREADS URNS QL MICRO: ABNORMAL /LPF
NITRITE UR QL STRIP.AUTO: NEGATIVE
PH UR STRIP: 6 (ref 5–8)
POTASSIUM SERPL-SCNC: 3.3 MMOL/L (ref 3.5–5.1)
PROT SERPL-MCNC: 7.3 G/DL (ref 6.4–8.3)
PROT UR STRIP-MCNC: NEGATIVE MG/DL
RBC #/AREA URNS HPF: ABNORMAL /HPF (ref 0–5)
SARS-COV-2 RNA RESP QL NAA+PROBE: NOT DETECTED
SODIUM SERPL-SCNC: 141 MMOL/L (ref 136–145)
SOURCE: NORMAL
SP GR UR REFRACTOMETRY: 1.02 (ref 1–1.03)
URINE CULTURE IF INDICATED: ABNORMAL
UROBILINOGEN UR QL STRIP.AUTO: 0.2 EU/DL (ref 0.2–1)
WBC URNS QL MICRO: ABNORMAL /HPF (ref 0–4)

## 2025-08-23 PROCEDURE — 81025 URINE PREGNANCY TEST: CPT

## 2025-08-23 PROCEDURE — 96361 HYDRATE IV INFUSION ADD-ON: CPT

## 2025-08-23 RX ORDER — PROMETHAZINE HYDROCHLORIDE 25 MG/1
25 TABLET ORAL 4 TIMES DAILY PRN
Qty: 20 TABLET | Refills: 0 | Status: SHIPPED | OUTPATIENT
Start: 2025-08-23 | End: 2025-08-30

## 2025-08-23 RX ORDER — DICYCLOMINE HCL 20 MG
20 TABLET ORAL
Qty: 20 TABLET | Refills: 0 | Status: SHIPPED | OUTPATIENT
Start: 2025-08-23

## 2025-08-23 ASSESSMENT — ENCOUNTER SYMPTOMS
VOMITING: 1
DIARRHEA: 1
RESPIRATORY NEGATIVE: 1
EYES NEGATIVE: 1
ABDOMINAL PAIN: 1
NAUSEA: 1

## 2025-08-23 ASSESSMENT — PAIN SCALES - GENERAL: PAINLEVEL_OUTOF10: 4

## 2025-08-23 ASSESSMENT — PAIN DESCRIPTION - LOCATION: LOCATION: ABDOMEN

## 2025-08-23 ASSESSMENT — PAIN DESCRIPTION - DESCRIPTORS: DESCRIPTORS: DISCOMFORT

## 2025-08-23 ASSESSMENT — PAIN DESCRIPTION - ORIENTATION: ORIENTATION: LOWER

## 2025-08-23 ASSESSMENT — PAIN - FUNCTIONAL ASSESSMENT: PAIN_FUNCTIONAL_ASSESSMENT: PREVENTS OR INTERFERES SOME ACTIVE ACTIVITIES AND ADLS

## 2025-09-02 ENCOUNTER — HOSPITAL ENCOUNTER (EMERGENCY)
Facility: HOSPITAL | Age: 35
Discharge: HOME | End: 2025-09-02
Attending: STUDENT IN AN ORGANIZED HEALTH CARE EDUCATION/TRAINING PROGRAM
Payer: MEDICAID

## 2025-09-02 VITALS
TEMPERATURE: 98.5 F | DIASTOLIC BLOOD PRESSURE: 77 MMHG | BODY MASS INDEX: 26.99 KG/M2 | RESPIRATION RATE: 16 BRPM | SYSTOLIC BLOOD PRESSURE: 108 MMHG | HEIGHT: 63 IN | WEIGHT: 152.34 LBS | OXYGEN SATURATION: 98 % | HEART RATE: 81 BPM

## 2025-09-02 DIAGNOSIS — N89.8 VAGINAL DISCHARGE: Primary | ICD-10-CM

## 2025-09-02 LAB
APPEARANCE UR: ABNORMAL
BACTERIA URNS QL MICRO: NEGATIVE /HPF
BILIRUB UR QL: NEGATIVE
CLUE CELLS VAG QL WET PREP: NORMAL
COLOR UR: ABNORMAL
EPITH CASTS URNS QL MICRO: ABNORMAL /LPF
GLUCOSE UR STRIP.AUTO-MCNC: NEGATIVE MG/DL
HGB UR QL STRIP: NEGATIVE
KETONES UR QL STRIP.AUTO: NEGATIVE MG/DL
LEUKOCYTE ESTERASE UR QL STRIP.AUTO: ABNORMAL
NITRITE UR QL STRIP.AUTO: NEGATIVE
PH UR STRIP: 7 (ref 5–8)
PROT UR STRIP-MCNC: NEGATIVE MG/DL
RBC #/AREA URNS HPF: ABNORMAL /HPF (ref 0–5)
SP GR UR REFRACTOMETRY: 1.01 (ref 1–1.03)
T VAGINALIS VAG QL WET PREP: NORMAL
URINE CULTURE IF INDICATED: ABNORMAL
UROBILINOGEN UR QL STRIP.AUTO: 1 EU/DL (ref 0.2–1)
WBC URNS QL MICRO: ABNORMAL /HPF (ref 0–4)
YEAST WET PREP: NORMAL

## 2025-09-02 PROCEDURE — 87491 CHLMYD TRACH DNA AMP PROBE: CPT

## 2025-09-02 PROCEDURE — 87210 SMEAR WET MOUNT SALINE/INK: CPT

## 2025-09-02 PROCEDURE — 81001 URINALYSIS AUTO W/SCOPE: CPT

## 2025-09-02 PROCEDURE — 99283 EMERGENCY DEPT VISIT LOW MDM: CPT

## 2025-09-02 PROCEDURE — 87591 N.GONORRHOEAE DNA AMP PROB: CPT

## 2025-09-02 ASSESSMENT — LIFESTYLE VARIABLES
HOW OFTEN DO YOU HAVE A DRINK CONTAINING ALCOHOL: NEVER
HOW MANY STANDARD DRINKS CONTAINING ALCOHOL DO YOU HAVE ON A TYPICAL DAY: PATIENT DOES NOT DRINK

## 2025-09-02 ASSESSMENT — PAIN SCALES - GENERAL: PAINLEVEL_OUTOF10: 3
